# Patient Record
Sex: MALE | Race: BLACK OR AFRICAN AMERICAN | NOT HISPANIC OR LATINO | Employment: STUDENT | URBAN - METROPOLITAN AREA
[De-identification: names, ages, dates, MRNs, and addresses within clinical notes are randomized per-mention and may not be internally consistent; named-entity substitution may affect disease eponyms.]

---
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
## 2023-03-30 ENCOUNTER — HOSPITAL ENCOUNTER (EMERGENCY)
Facility: CLINIC | Age: 23
Discharge: HOME | End: 2023-03-30
Attending: EMERGENCY MEDICINE | Admitting: EMERGENCY MEDICINE
Payer: COMMERCIAL

## 2023-03-30 VITALS
TEMPERATURE: 97 F | HEART RATE: 84 BPM | DIASTOLIC BLOOD PRESSURE: 56 MMHG | WEIGHT: 179.6 LBS | OXYGEN SATURATION: 100 % | RESPIRATION RATE: 18 BRPM | SYSTOLIC BLOOD PRESSURE: 124 MMHG | HEIGHT: 69 IN | BODY MASS INDEX: 26.6 KG/M2

## 2023-03-30 DIAGNOSIS — F11.21 OPIOID DEPENDENCE IN REMISSION (H): ICD-10-CM

## 2023-03-30 DIAGNOSIS — Z76.0 ENCOUNTER FOR MEDICATION REFILL: ICD-10-CM

## 2023-03-30 PROCEDURE — 250N000013 HC RX MED GY IP 250 OP 250 PS 637: Performed by: EMERGENCY MEDICINE

## 2023-03-30 PROCEDURE — 99283 EMERGENCY DEPT VISIT LOW MDM: CPT

## 2023-03-30 PROCEDURE — 99283 EMERGENCY DEPT VISIT LOW MDM: CPT | Performed by: EMERGENCY MEDICINE

## 2023-03-30 RX ORDER — BUPRENORPHINE AND NALOXONE 2; .5 MG/1; MG/1
1 FILM, SOLUBLE BUCCAL; SUBLINGUAL DAILY
Qty: 7 FILM | Refills: 0 | Status: SHIPPED | OUTPATIENT
Start: 2023-03-30 | End: 2023-04-06

## 2023-03-30 RX ORDER — BUPRENORPHINE AND NALOXONE 2; .5 MG/1; MG/1
1 FILM, SOLUBLE BUCCAL; SUBLINGUAL ONCE
Status: COMPLETED | OUTPATIENT
Start: 2023-03-30 | End: 2023-03-30

## 2023-03-30 RX ADMIN — BUPRENORPHINE AND NALOXONE 1 FILM: 2; .5 FILM, SOLUBLE BUCCAL; SUBLINGUAL at 15:58

## 2023-03-30 ASSESSMENT — ACTIVITIES OF DAILY LIVING (ADL): ADLS_ACUITY_SCORE: 33

## 2023-03-30 NOTE — DISCHARGE INSTRUCTIONS
Suboxone prescription given for 7 day supply.     Please follow up with your suboxone provider our [facility] service for further refills.     [facility]  [address], [address]   [place], [place], [address]  Phone: [phone]  Fax: [phone]    Open Monday-Friday  Closed over lunch hour  Walk in hours: 9am-11:30am and 12:30-3pm

## 2023-03-30 NOTE — ED PROVIDER NOTES
"    [facility] EMERGENCY DEPARTMENT ([facility])    3/30/23      ED PROVIDER NOTE    History     Chief Complaint   Patient presents with     Withdrawal     Pt states he ran out of suboxone 2 days ago and is looking to get another prescription for it.     The history is provided by the patient and medical records.     Reji Goldstein is a 22 year old male with past medical history significant for opioid dependence who presents to the ED requesting Suboxone.  He says he has been out of his Suboxone for 2 days.  He says he contacted [facility] in [place] but was told he needed an appointment and could not get in today.  He says he has been on Suboxone for 1 year and is trying to get off of it.  He says he is on 2 mg once daily.  He denies recent drug use.    Past Medical History  History reviewed. No pertinent past medical history.  History reviewed. No pertinent surgical history.  buprenorphine HCl-naloxone HCl (SUBOXONE) 2-0.5 MG per film  naloxone (NARCAN) 4 MG/0.1ML nasal spray      No Known Allergies  Family History  History reviewed. No pertinent family history.  Social History   Social History     Tobacco Use     Smoking status: Some Days     Types: Vaping Device     Passive exposure: Never   Substance Use Topics     Alcohol use: Not Currently     Drug use: Not Currently      Past medical history, past surgical history, medications, allergies, family history, and social history were reviewed with the patient. No additional pertinent items.      A medically appropriate review of systems was performed with pertinent positives and negatives noted in the HPI, and all other systems negative.    Physical Exam   BP: 110/64  Pulse: 84  Temp: 97.9  F (36.6  C)  Resp: 18  Height: 175.3 cm (5' 9\")  Weight: 81.5 kg (179 lb 9.6 oz)  SpO2: 99 %  Physical Exam  Vitals and nursing note reviewed.   Constitutional:       Appearance: He is normal weight.   HENT:      Head: Normocephalic and atraumatic.      Nose: No " congestion or rhinorrhea.   Eyes:      General: No scleral icterus.     Extraocular Movements: Extraocular movements intact.   Cardiovascular:      Rate and Rhythm: Normal rate.   Pulmonary:      Effort: Pulmonary effort is normal.   Musculoskeletal:         General: No deformity or signs of injury.   Skin:     Coloration: Skin is not jaundiced or pale.   Neurological:      General: No focal deficit present.      Mental Status: He is alert and oriented to person, place, and time.   Psychiatric:         Mood and Affect: Mood normal.         Behavior: Behavior normal.         Thought Content: Thought content normal.         Judgment: Judgment normal.           ED Course, Procedures, & Data     3:44 PM  The patient was seen and examined by Faina Garcia MD in .     Procedures                     No results found for any visits on 03/30/23.  Medications   buprenorphine HCl-naloxone HCl (SUBOXONE) 2-0.5 MG per film 1 Film (1 Film Sublingual $Given 3/30/23 4845)     Labs Ordered and Resulted from Time of ED Arrival to Time of ED Departure - No data to display  No orders to display          Critical care was not performed.     Medical Decision Making  The patient's presentation was of straightforward complexity (a clearly self-limited or minor problem).    The patient's evaluation involved:  history and exam without other MDM data elements    The patient's management necessitated moderate risk (prescription drug management including medications given in the ED).      Assessment & Plan    Reji Goldstein is a 22 year old male with past medical history significant for opioid dependence who presents to the ED requesting Suboxone.   He has been without his suboxone for 2 days and does have a suboxone provider.  He was given a suboxone dose here and a refill to help bridge until he has seen his suboxone provider. He denies recent opiate use.  He was also given information to our recovery clinic.     I have reviewed the nursing  notes. I have reviewed the findings, diagnosis, plan and need for follow up with the patient.    Discharge Medication List as of 3/30/2023  4:01 PM      START taking these medications    Details   buprenorphine HCl-naloxone HCl (SUBOXONE) 2-0.5 MG per film Place 1 Film under the tongue daily for 7 days, Disp-7 Film, R-0, Local Print             Final diagnoses:   Encounter for medication refill   Opioid dependence in remission (H)     I, Sera Lyon, am serving as a trained medical scribe to document services personally performed by Faina Garcia MD based on the provider's statements to me on March 30, 2023.  This document has been checked and approved by the attending provider.    I, Faina Garcia MD, was physically present and have reviewed and verified the accuracy of this note documented by Sera Lyon, medical scribe.        Faina Garcia MD  [facility] EMERGENCY DEPARTMENT  3/30/2023     Faina Garica MD  04/01/23 3723

## 2023-05-15 ENCOUNTER — OFFICE VISIT (OUTPATIENT)
Dept: BEHAVIORAL HEALTH | Facility: CLINIC | Age: 23
End: 2023-05-15
Payer: COMMERCIAL

## 2023-05-15 VITALS — HEART RATE: 82 BPM | OXYGEN SATURATION: 100 % | SYSTOLIC BLOOD PRESSURE: 113 MMHG | DIASTOLIC BLOOD PRESSURE: 68 MMHG

## 2023-05-15 DIAGNOSIS — F11.20 OPIOID USE DISORDER, SEVERE, DEPENDENCE (H): Primary | ICD-10-CM

## 2023-05-15 DIAGNOSIS — F17.200 NICOTINE USE DISORDER: ICD-10-CM

## 2023-05-15 LAB
AMPHETAMINE QUAL URINE POCT: NEGATIVE
BARBITURATE QUAL URINE POCT: NEGATIVE
BENZODIAZEPINE QUAL URINE POCT: NEGATIVE
BUPRENORPHINE QUAL URINE POCT: ABNORMAL
COCAINE QUAL URINE POCT: NEGATIVE
CREATININE QUAL URINE POCT: ABNORMAL
FENTANYL UR QL: NORMAL
INTERNAL QC QUAL URINE POCT: ABNORMAL
MDMA QUAL URINE POCT: NEGATIVE
METHADONE QUAL URINE POCT: NEGATIVE
METHAMPHETAMINE QUAL URINE POCT: NEGATIVE
OPIATE QUAL URINE POCT: NEGATIVE
OXYCODONE QUAL URINE POCT: NEGATIVE
PH QUAL URINE POCT: ABNORMAL
PHENCYCLIDINE QUAL URINE POCT: NEGATIVE
POCT KIT EXPIRATION DATE: ABNORMAL
POCT KIT LOT NUMBER: ABNORMAL
SPECIFIC GRAVITY POCT: >=1.03
TEMPERATURE URINE POCT: ABNORMAL
THC QUAL URINE POCT: NEGATIVE

## 2023-05-15 PROCEDURE — 80305 DRUG TEST PRSMV DIR OPT OBS: CPT | Performed by: FAMILY MEDICINE

## 2023-05-15 PROCEDURE — 80307 DRUG TEST PRSMV CHEM ANLYZR: CPT | Performed by: FAMILY MEDICINE

## 2023-05-15 PROCEDURE — 99205 OFFICE O/P NEW HI 60 MIN: CPT | Performed by: FAMILY MEDICINE

## 2023-05-15 RX ORDER — BUPRENORPHINE HYDROCHLORIDE, NALOXONE HYDROCHLORIDE 2; .5 MG/1; MG/1
FILM, SOLUBLE BUCCAL; SUBLINGUAL
COMMUNITY
Start: 2023-04-24 | End: 2023-07-07

## 2023-05-15 RX ORDER — BUPRENORPHINE AND NALOXONE 2; .5 MG/1; MG/1
1 FILM, SOLUBLE BUCCAL; SUBLINGUAL DAILY
Qty: 15 FILM | Refills: 0 | Status: SHIPPED | OUTPATIENT
Start: 2023-05-15 | End: 2023-05-26

## 2023-05-15 ASSESSMENT — PATIENT HEALTH QUESTIONNAIRE - PHQ9: SUM OF ALL RESPONSES TO PHQ QUESTIONS 1-9: 2

## 2023-05-15 NOTE — PROGRESS NOTES
[facility] Initial Visit    ASSESSMENT/PLAN                                                      1. Opioid use disorder, severe, dependence (H)  22 yo male with 3-4 yr h/o inhaled fentanyl use, last reported use 12/2021.  On buprenorphine since 12/2021, has been tapering dose of buprenorphine at his request since Fall, 2022.  Has been taking 2.5mg buprenorphine/day for the past 2-3 weeks.  Previously experienced withdrawal symptoms with attempts to discontinue buprenorphine.  Wants to continue to taper buprenorphine.    Not interested in transfer to XR buprenorphine at this time.   Proceed with pt's request to decrease buprenorphine to 2mg/day.  Discussed no greater decrease in dose than 25% of TDD every 2 weeks.   F/u fentanyl testing today.   Reviewed risks of return to use with discontinuation of therapy  Pt declined ID screening today.   - Drugs of Abuse Screen Urine (POC CUPS) POCT; Standing  - Fentanyl Qualitative with Reflex to Quant Urine; Future  - naloxone (NARCAN) 4 MG/0.1ML nasal spray; Spray 1 spray (4 mg) into one nostril alternating nostrils as needed for opioid reversal every 2-3 minutes until assistance arrives  Dispense: 0.2 mL; Refill: 0  - Drugs of Abuse Screen Urine (POC CUPS) POCT  - Fentanyl Qualitative with Reflex to Quant Urine  - buprenorphine HCl-naloxone HCl (SUBOXONE) 2-0.5 MG per film; Place 1 Film under the tongue daily  Dispense: 15 Film; Refill: 0    2. Nicotine use disorder  Encouraged efforts to quit vaping  - nicotine polacrilex (NICORETTE) 4 MG gum; Place 1 each (4 mg) inside cheek every hour as needed for smoking cessation  Dispense: 240 each; Refill: 3       Return in 11 days (on 5/26/2023) for Follow up, in person.    Patient counseling completed today:  Discussed mechanism of action, potential risks/benefits/side effects of medications and other recommendations above.    Harm reduction counseling including never use alone, availability of naloxone,  avoiding combination of opioids with benzodiazepines, alcohol, or other sedatives, safer administration.      Discussed importance of avoiding isolation, building a network of supportive relationships, avoiding people/places/things associated with past use to reduce risk of relapse    SUBJECTIVE                                                      CC/HPI:  Reji Goldstein is a 23 year old male with PMH nicotine use disorder and opioid use disorder on buprenorphine taper per his request who presents to the [facility] for initial visit.      Brief History:  Reji Goldstein was first seen in Recovery Clinic on 05/15/23. They were referred by staff from [facility] ED after pt was seen there in 3/2023 requesting rx for buprenorphine.   Patient's reasons for seeking treatment on this date include wanting to continue taper off buprenorphine.    Pt started buprenorphine for treatment of OUD through [facility] in 12/2021.  Max dose 16mg/day.  He began tapering dose on his own in Fall, 2022.  He has continued to decrease dose since that time  Most recently he reports taking buprenorphine 2.5mg/day for the past 2-3 weeks.  He has experienced withdrawal symptoms in previous attempts to stop buprenorphine altogether.  He denies return to use of fentanyl since 12/2021.  He also denies cravings.      Substance Use History :  Opioids:   Age at first use: 19  Current use: substance: Perc 30's ; quantity stated never counted but 10 pills would be the max he would use daily ; route: inhalation  ; timing of last use: 12/2021;      IV drug use: No   History of overdose: Yes: one episode in a vehicle with friends 7/2020 x2  Previous residential or outpatient treatments for addiction : Yes: kerrie in 2021 in patient   Previous medication treatments for addiction: Yes: Currently is taking Suboxone   Longest period of sobriety: currently around 1.5 years   Medical complications related to substance use: denies   Hepatitis C: no record of  testing  HIV: 8/14/2019 HIV ag/ab nonreactive    Other Addiction History:  Stimulants   Denies   Sedatives/hypnotics/anxiolytics:   Denies   Alcohol:   Denies   Nicotine:   currently is vaping   Cannabis:   Denies   Hallucinogens/Dissociatives:   Denies   Eating disorder:  Denies   Gambling:   Denies         [place] Prescription Drug Monitoring Program Reviewed:  Yes;   04/24/2023  2   04/19/2023  Suboxone 2 Mg-0.5 MG SL Film  9.00  21 Me Bau   [id number]   [facility] ([id number]   0/[place]  0.86 mg  [place]icaid   [place]   04/19/2023  2   04/19/2023  Suboxone 2 Mg-0.5 MG SL Film  3.00  5 Me Bau   [id number]   [facility] ([id number])   0/[place]  1.20 mg  [place]   04/10/2023  2   04/10/2023  Suboxone 2 Mg-0.5 MG SL Film  4.00  8 De Ebe   [id number]   [facility] ([id number])   0/[place]  1.00 mg  [place]   03/30/2023  2   03/30/2023  Suboxone 2 Mg-0.5 MG SL Film  7.00  7 Ca Jean-Paul   [id number]   [facility] ([id number])   0/[place]  2.00 mg  [place]   03/23/2023  2   02/28/2023  Suboxone 2 Mg-0.5 MG SL Film  2.00  8 Le Sim   [id number]   [facility] ([id number][place]   0/[place]  0.50 mg  [place]   03/22/2023  2   02/28/2023  Suboxone 2 Mg-0.5 MG SL Film  2.00  4 Le Sim   [id number]   [facility] ([id number])   0/[place]  1.00 mg  [place]   02/22/2023  2   02/21/2023  Suboxone 2 Mg-0.5 MG SL Film  7.00  7 Le Sim   [id number]   [facility] ([id number])   0/[place]  2.00 mg  [place]   02/08/2023  2   02/08/2023  Suboxone 2 Mg-0.5 MG SL Film  11.00  11 De Ebe   [id number]   [facility] ([id number][place]   0/[place]  2.00 mg  [place]   10/18/2022  2   10/17/2022  Suboxone 8 Mg-2 MG SL Film  60.00  30 Ph Gyu   [id number]   [facility] ([id number])   0/[place]  16.00 mg  [place]   09/01/2022  2   08/30/2022  Suboxone 8 Mg-2 MG SL Film  60.00  30 Ph Gyu   [id number]   [facility] ([id number])   0/[place]  16.00 mg  [place]   08/29/2022  2   08/29/2022  Suboxone 8 Mg-2 MG SL Film  2.00  1 Ph Gyu   [id number]   [facility] ([id number])   0/[place]  16.00 mg  [place]   08/01/2022  2   08/01/2022  Suboxone 12 Mg-3 MG SL Film  30.00  30 Ph Gyu   [id number]   [facility] ([id number])   0/[place]  12.00 mg  [place]   07/18/2022  2   07/15/2022   Suboxone 12 Mg-3 MG SL Film  15.00  15  Gyu   [id number]   [facility] ([id number])   0/0  12.00 mg  [place]icaid   [place]         No past medical history on file.      PAST PSYCHIATRIC HISTORY:  Diagnoses- denies   Suicide Attempts: No   Hospitalizations: No         5/15/2023    12:49 PM   PHQ   PHQ-9 Total Score 2   Q9: Thoughts of better off dead/self-harm past 2 weeks Not at all         If PHQ-9 score of 15 or higher, has [facility] therapist or provider been notified? N/A    Any current suicidal ideation? No  If yes, has [facility] therapist or provider been notified? N/A    Mental health provider: denies  (follow up on MH referral if needed)    No past surgical history on file.    Medications:  naloxone (NARCAN) 4 MG/0.1ML nasal spray, Spray 1 spray (4 mg) into one nostril alternating nostrils as needed for opioid reversal every 2-3 minutes until assistance arrives    No current facility-administered medications on file prior to visit.      No Known Allergies    No family history on file.      Social History  Housing status: with family father and 8 siblings  Employment status: Unemployed, seeking work; in college studying nursing  Relationship status: Single  Children: no children  Legal: denies   Insurance needs: active   Contact information up to date? yes    3rd Party Involvement not at this time      REVIEW OF SYSTEMS:  Denies withdrawal symptoms when taking buprenorphine 2.5mg/day.     OBJECTIVE                                                      /68   Pulse 82   SpO2 100%     Physical Exam  Constitutional:       Appearance: Normal appearance.   HENT:      Head: Normocephalic and atraumatic.      Nose: No rhinorrhea.   Eyes:      General: No scleral icterus.     Extraocular Movements: Extraocular movements intact.      Conjunctiva/sclera: Conjunctivae normal.   Pulmonary:      Effort: Pulmonary effort is normal.   Neurological:      Mental Status: He is alert and oriented to person, place, and time.       Coordination: Coordination is intact.      Gait: Gait is intact.   Psychiatric:         Attention and Perception: Attention and perception normal.         Mood and Affect: Mood and affect normal.         Speech: Speech normal.         Behavior: Behavior is cooperative.         Thought Content: Thought content normal.      Comments: Insight and judgement are fair to good         Labs:    UDS:   No results found for: BUP, BZO, BAR, RAMON, MAMP, AMP, MDMA, MTD, PMC739, OXY, PCP, THC, TEMP, SGPOCT    *POC urine drug screen does not screen for Fentanyl    Recent Results (from the past 720 hour(s))   Drugs of Abuse Screen Urine (POC CUPS) POCT    Collection Time: 05/15/23  1:13 PM   Result Value Ref Range    POCT Kit Lot Number [id number]     POCT Kit Expiration Date 8171273     Temperature Urine POCT 90 F 90 F, 92 F, 94 F, 96 F, 98 F, 100 F    Specific Gravity POCT >=1.030 (A) 1.005, 1.015, 1.025    pH Qual Urine POCT 7 pH 4 pH, 5 pH, 7 pH, 9 pH    Creatinine Qual Urine POCT 100 mg/dL 20 mg/dL, 50 mg/dL, 100 mg/dL, 200 mg/dL    Internal QC Qual Urine POCT Valid Valid    Amphetamine Qual Urine POCT Negative Negative    Barbiturate Qual Urine POCT Negative Negative    Buprenorphine Qual Urine POCT Screen Positive (A) Negative    Benzodiazepine Qual Urine POCT Negative Negative    Cocaine Qual Urine POCT Negative Negative    Methamphetamine Qual Urine POCT Negative Negative    MDMA Qual Urine POCT Negative Negative    Methadone Qual Urine POCT Negative Negative    Opiate Qual Urine POCT Negative Negative    Oxycodone Qual Urine POCT Negative Negative    Phencyclidine Qual Urine POCT Negative Negative    THC Qual Urine POCT Negative Negative               At least 60 min spent in review of medical record,  review, obtaining histories, discussing recommendations, counseling, providing support.      Erica Padron MD  Addiction Medicine  [facility] [place] [facility]  [address] [place] [address]  [id number]  [phone]

## 2023-05-26 ENCOUNTER — OFFICE VISIT (OUTPATIENT)
Dept: BEHAVIORAL HEALTH | Facility: CLINIC | Age: 23
End: 2023-05-26
Payer: COMMERCIAL

## 2023-05-26 VITALS
SYSTOLIC BLOOD PRESSURE: 109 MMHG | BODY MASS INDEX: 25.99 KG/M2 | WEIGHT: 176 LBS | DIASTOLIC BLOOD PRESSURE: 81 MMHG | HEART RATE: 80 BPM

## 2023-05-26 DIAGNOSIS — F11.20 OPIOID USE DISORDER, SEVERE, DEPENDENCE (H): Primary | ICD-10-CM

## 2023-05-26 DIAGNOSIS — F17.200 NICOTINE USE DISORDER: ICD-10-CM

## 2023-05-26 LAB
AMPHETAMINE QUAL URINE POCT: NEGATIVE
BARBITURATE QUAL URINE POCT: NEGATIVE
BENZODIAZEPINE QUAL URINE POCT: NEGATIVE
BUPRENORPHINE QUAL URINE POCT: ABNORMAL
COCAINE QUAL URINE POCT: NEGATIVE
CREATININE QUAL URINE POCT: ABNORMAL
FENTANYL UR QL: NORMAL
INTERNAL QC QUAL URINE POCT: ABNORMAL
MDMA QUAL URINE POCT: NEGATIVE
METHADONE QUAL URINE POCT: NEGATIVE
METHAMPHETAMINE QUAL URINE POCT: NEGATIVE
OPIATE QUAL URINE POCT: NEGATIVE
OXYCODONE QUAL URINE POCT: NEGATIVE
PH QUAL URINE POCT: ABNORMAL
PHENCYCLIDINE QUAL URINE POCT: NEGATIVE
POCT KIT EXPIRATION DATE: ABNORMAL
POCT KIT LOT NUMBER: ABNORMAL
SPECIFIC GRAVITY POCT: 1.02
TEMPERATURE URINE POCT: ABNORMAL
THC QUAL URINE POCT: NEGATIVE

## 2023-05-26 PROCEDURE — 80307 DRUG TEST PRSMV CHEM ANLYZR: CPT | Performed by: FAMILY MEDICINE

## 2023-05-26 PROCEDURE — 80299 QUANTITATIVE ASSAY DRUG: CPT | Mod: 90 | Performed by: FAMILY MEDICINE

## 2023-05-26 PROCEDURE — 99214 OFFICE O/P EST MOD 30 MIN: CPT | Performed by: FAMILY MEDICINE

## 2023-05-26 PROCEDURE — H0038 SELF-HELP/PEER SVC PER 15MIN: HCPCS | Mod: U5

## 2023-05-26 PROCEDURE — 99000 SPECIMEN HANDLING OFFICE-LAB: CPT | Performed by: FAMILY MEDICINE

## 2023-05-26 RX ORDER — BUPRENORPHINE AND NALOXONE 2; .5 MG/1; MG/1
1 FILM, SOLUBLE BUCCAL; SUBLINGUAL DAILY
Qty: 15 FILM | Refills: 0 | Status: SHIPPED | OUTPATIENT
Start: 2023-05-26 | End: 2023-06-14

## 2023-05-26 ASSESSMENT — PAIN SCALES - GENERAL: PAINLEVEL: NO PAIN (0)

## 2023-05-26 ASSESSMENT — PATIENT HEALTH QUESTIONNAIRE - PHQ9: SUM OF ALL RESPONSES TO PHQ QUESTIONS 1-9: 1

## 2023-05-26 NOTE — PROGRESS NOTES
[facility]      Rooming information:  Approximate last use of full opioid agonist: End of 2021  Taking buprenorphine? Yes:  As prescribed? No. Reports using less, 1/2 film a day. Pt says it hold him well  Number of buprenorphine films/tablets remaining currently: maybe 5 or 6  Side effects related to buprenorphine (constipation, dry mouth, sedation?) No   Narcan currently available: Yes  Other recent substance use:    Denies  NICOTINE-Yes: vape  If using nicotine, ready to quit? Yes: trying to quit    Point of care urine drug screen positive for: Unable to urinate, drinking coffee  Lab Results   Component Value Date    BUP Screen Positive (A) 05/26/2023    BZO Negative 05/26/2023    BAR Negative 05/26/2023    RAMON Negative 05/26/2023    MAMP Negative 05/26/2023    AMP Negative 05/26/2023    MDMA Negative 05/26/2023    MTD Negative 05/26/2023    [id number] Negative 05/26/2023    OXY Negative 05/26/2023    PCP Negative 05/26/2023    THC Negative 05/26/2023    TEMP 94 F 05/26/2023    SGPOCT 1.025 05/26/2023       *POC urine drug screen does not screen for Fentanyl            5/26/2023    11:00 AM   PHQ Assesment Total Score(s)   PHQ-9 Score 1       If PHQ-9 score of 15 or higher, has [facility] therapist or provider been notified? No    Any current suicidal ideation? N/A  If yes, has [facility] therapist or provider been notified? No and N/A    Primary care provider: Physician No Ref-Primary     Mental health provider: no (follow up on MH referral if needed)    Insurance needs: active    Housing needs: lives with his family    Contact information up to date? yes    3rd Party Involvement NA (please obtain ELBA if pt would like to include)    Sravanthi Wagner LPN  May 26, 2023  11:01 AM

## 2023-05-26 NOTE — PROGRESS NOTES
[facility]    Peer  met with Reji Goldstein in the Recovery Clinic to introduce himself, detail services provided and discuss current status of recovery. Pt appeared alert, oriented and open to feedback during our discussion.     Pt arrives for visit with provider for suboxone refill.    Reji  reports  recovery going well. He has an up coming appointment with Rickey BERKOWITZ at [facility]. Reji  shared his Doc  was Percocet.  Writer commend him  for avoiding old routines and habits          PRC provided business card to pt welcoming contact for recovery based support and resources. PRC and pt agree to speak again during an upcoming  visit.           Service Type:     Individual     Session Start Time:  11:15 am                     Session End Time:    11:30 am    Session Length:   15 mins    Patient Goal:   To utilize suboxone assisted treatment for sobriety and long term recovery.     Goal Progress:   Ongoing.    Key Risk Factors to Recovery:   PRC encourages being aware of risk factors that can lead to re-use which include avoiding isolation, avoiding triggers and managing cravings in a healthy manner. being open and willing to acceptance and change on a daily basis.  PRC encourages pt to establish a sober network calling tree to reach out to when needed.  Continue to practice honesty with ourselves and trusted support person(s).   PRC encourages regular attendance at recovery based meetings as well as finding a sponsor for mentoring and accountability.   PRC encourages consideration of other services such as counseling for mental health issues which can correlate with our substance use.      Support Needs:   Ongoing care, support and resources for opioid substance use disorder.     Follow up:   PRC has provided pt with his contact information for support and resource needs.    PRC and pt agree to meet during an upcoming  visit.       [facility]  Clinic  [facility] [address], [address]   [place], [place], [address]  Clinic Phone: [phone]  Clinic Fax: [phone]  Peer  phone: [phone]    Open Monday - Friday  9:00am-4:00pm  Walk in hours: 9am-3pm      Iona Olivares  May 26, 2023  1:43 PM    Derrek YIN provides clinical oversite and supervision of care.

## 2023-05-26 NOTE — PROGRESS NOTES
[facility] Return Visit    ASSESSMENT/PLAN                                                    1. Opioid use disorder, severe, dependence (H)  Pt reduced buprenorphine from 2mg/day to 1mg/day 5/15/23.  Denies withdrawal symptoms or cravings.   Would like to continue taper off buprenorphine.   Reviewed risk of return to use with discontinuation of treatment, discussed tapering process is voluntary and can be reversed at any time.   Recommend he continue buprenorphine 1mg/day at this time before proceeding with further reduction.  Pt in agreement with this.   Discussed he can return to 2mg/day if needed.   Pt met with  therapist today to discuss his questions about finding employment and scheduled a visit.      - Buprenorphine & Metabolite Screen; Future  - Fentanyl Qualitative with Reflex to Quant Urine; Future  - Fentanyl Qualitative with Reflex to Quant Urine  - Buprenorphine & Metabolite Screen  - Drugs of Abuse Screen Urine (POC CUPS) POCT  - buprenorphine HCl-naloxone HCl (SUBOXONE) 2-0.5 MG per film; Place 1 Film under the tongue daily  Dispense: 15 Film; Refill: 0    2. Nicotine use disorder  Encouraged his efforts to quit vaping, proceed with plans to  rx for nicotine gum to assist.      Return in about 2 weeks (around 6/9/2023) for Follow up, in person.    Patient counseling completed today:  Discussed mechanism of action, potential risks/benefits/side effects of medications and other recommendations above.    Harm reduction counseling including never use alone, availability of naloxone, avoiding combination of opioids with benzodiazepines, alcohol, or other sedatives, safer administration.      Discussed importance of avoiding isolation, building a network of supportive relationships, avoiding people/places/things associated with past use to reduce risk of relapse    SUBJECTIVE                                                      CC/HPI:  Rejiryan Goldstein is a 23 year old male  with [facility] nicotine use disorder and opioid use disorder on buprenorphine taper per his request who presents to the Recovery Clinic for return visit.      Brief History:  Reji Goldstein was first seen in Recovery Clinic on 05/15/23. They were referred by staff from [facility] ED after pt was seen there in 3/2023 requesting rx for buprenorphine.   Patient's reasons for seeking treatment on this date include wanting to continue taper off buprenorphine.    Pt started buprenorphine for treatment of OUD through [facility] in 12/2021.  Max dose 16mg/day.  He began tapering dose on his own in Fall, 2022.  He has continued to decrease dose since that time  Most recently he reports taking buprenorphine 2.5mg/day for the past 2-3 weeks.  He has experienced withdrawal symptoms in previous attempts to stop buprenorphine altogether.  He denies return to use of fentanyl since 12/2021.  He also denies cravings.      Substance Use History :  Opioids:   Age at first use: 19  Current use: substance: Perc 30's ; quantity stated never counted but 10 pills would be the max he would use daily ; route: inhalation  ; timing of last use: 12/2021;      IV drug use: No   History of overdose: Yes: one episode in a vehicle with friends 7/2020 x2  Previous residential or outpatient treatments for addiction : Yes: [facility] in 2021 in patient   Previous medication treatments for addiction: Yes: Currently is taking Suboxone   Longest period of sobriety: currently around 1.5 years   Medical complications related to substance use: denies   Hepatitis C: no record of testing  HIV: 8/14/2019 HIV ag/ab nonreactive    Other Addiction History:  Stimulants   Denies   Sedatives/hypnotics/anxiolytics:   Denies   Alcohol:   Denies   Nicotine:   currently is vaping   Cannabis:   Denies   Hallucinogens/Dissociatives:   Denies   Eating disorder:  Denies   Gambling:   Denies       A/P from most recent  visit 5/15/23:  1. Opioid use disorder, severe, dependence (H)  24 yo male  "with 3-4 yr h/o inhaled fentanyl use, last reported use 12/2021.  On buprenorphine since 12/2021, has been tapering dose of buprenorphine at his request since Fall, 2022.  Has been taking 2.5mg buprenorphine/day for the past 2-3 weeks.  Previously experienced withdrawal symptoms with attempts to discontinue buprenorphine.  Wants to continue to taper buprenorphine.    Not interested in transfer to XR buprenorphine at this time.   Proceed with pt's request to decrease buprenorphine to 2mg/day.  Discussed no greater decrease in dose than 25% of TDD every 2 weeks.   F/u fentanyl testing today.   Reviewed risks of return to use with discontinuation of therapy  Pt declined ID screening today.   - Drugs of Abuse Screen Urine (POC CUPS) POCT; Standing  - Fentanyl Qualitative with Reflex to Quant Urine; Future  - naloxone (NARCAN) 4 MG/0.1ML nasal spray; Spray 1 spray (4 mg) into one nostril alternating nostrils as needed for opioid reversal every 2-3 minutes until assistance arrives  Dispense: 0.2 mL; Refill: 0  - Drugs of Abuse Screen Urine (POC CUPS) POCT  - Fentanyl Qualitative with Reflex to Quant Urine  - buprenorphine HCl-naloxone HCl (SUBOXONE) 2-0.5 MG per film; Place 1 Film under the tongue daily  Dispense: 15 Film; Refill: 0     2. Nicotine use disorder  Encouraged efforts to quit vaping  - nicotine polacrilex (NICORETTE) 4 MG gum; Place 1 each (4 mg) inside cheek every hour as needed for smoking cessation  Dispense: 240 each; Refill: 3                Return in 11 days (on 5/26/2023) for Follow up, in person.      5/26/23 visit:  Pt states he reduced buprenorphine to 1mg/day after his 5/15 visit.  He denies withdrawal symptoms or cravings for opioids.  Denies any return to use.  Only other substance he reports using is nicotine.  Has been trying to vape less often, using distraction.  Has not picked up rx for nicotine gum yet but plans to.    States his moods have been good, that he has \"a happy family, there's " "nothing to feel bad about.\"  Describes participating in therapy in the past, but does not think he needs that type of support right now.   Taking a break from school over the summer, would like to find a job.     [place] Prescription Drug Monitoring Program Reviewed:  Yes;   05/15/2023  1   05/15/2023  Suboxone 2 Mg-0.5 MG SL Film  15.00  15 Li Vol   [id number]   Wal ([id number])   0/0  2.00 mg  [place]   04/24/2023  1   04/19/2023  Suboxone 2 Mg-0.5 MG SL Film  9.00  21 Me Albert   [id number]   Wal ([id number])   0/0  0.86 mg  [place]           No past medical history on file.      PAST PSYCHIATRIC HISTORY:  Diagnoses- denies   Suicide Attempts: No   Hospitalizations: No         5/15/2023    12:49 PM 5/26/2023    11:00 AM   PHQ   PHQ-9 Total Score 2 1   Q9: Thoughts of better off dead/self-harm past 2 weeks Not at all Not at all       Mental health provider: denies  (follow up on MH referral if needed)    No past surgical history on file.    Medications:  buprenorphine HCl-naloxone HCl (SUBOXONE) 2-0.5 MG per film, Place 1 Film under the tongue daily  naloxone (NARCAN) 4 MG/0.1ML nasal spray, Spray 1 spray (4 mg) into one nostril alternating nostrils as needed for opioid reversal every 2-3 minutes until assistance arrives  nicotine polacrilex (NICORETTE) 4 MG gum, Place 1 each (4 mg) inside cheek every hour as needed for smoking cessation (Patient not taking: Reported on 5/26/2023)  SUBOXONE 2-0.5 MG per film, Take one film daily    No current facility-administered medications on file prior to visit.      No Known Allergies    No family history on file.      Social History  Housing status: with family father and 10 siblings  Employment status: Unemployed, seeking work; in college studying nursing  Relationship status: Single  Children: no children  Legal: denies   Insurance needs: active   Contact information up to date? yes    3rd Party Involvement not at this time      REVIEW OF SYSTEMS:  No other concerns    OBJECTIVE  "                                                     /81 (BP Location: Left arm, Patient Position: Sitting, Cuff Size: Adult Regular)   Pulse 80   Wt 79.8 kg (176 lb)   BMI 25.99 kg/m      Physical Exam  Constitutional:       Appearance: Normal appearance.   HENT:      Head: Normocephalic and atraumatic.      Nose: No rhinorrhea.   Eyes:      General: No scleral icterus.     Extraocular Movements: Extraocular movements intact.      Conjunctiva/sclera: Conjunctivae normal.   Pulmonary:      Effort: Pulmonary effort is normal.   Neurological:      Mental Status: He is alert and oriented to person, place, and time.      Coordination: Coordination is intact.      Gait: Gait is intact.   Psychiatric:         Attention and Perception: Attention and perception normal.         Mood and Affect: Mood and affect normal.         Speech: Speech normal.         Behavior: Behavior is cooperative.         Thought Content: Thought content normal.      Comments: Insight and judgement are fair to good         Labs:    UDS:   Lab Results   Component Value Date    BUP Screen Positive (A) 05/15/2023    BZO Negative 05/15/2023    BAR Negative 05/15/2023    RAMON Negative 05/15/2023    MAMP Negative 05/15/2023    AMP Negative 05/15/2023    MDMA Negative 05/15/2023    MTD Negative 05/15/2023    [id number] Negative 05/15/2023    OXY Negative 05/15/2023    PCP Negative 05/15/2023    THC Negative 05/15/2023    TEMP 90 F 05/15/2023    SGPOCT >=1.030 (A) 05/15/2023       *POC urine drug screen does not screen for Fentanyl    Recent Results (from the past 720 hour(s))   Fentanyl Qualitative with Reflex to Quant Urine    Collection Time: 05/15/23  1:12 PM   Result Value Ref Range    Fentanyl Qual Urine Screen Negative Screen Negative   Drugs of Abuse Screen Urine (POC CUPS) POCT    Collection Time: 05/15/23  1:13 PM   Result Value Ref Range    POCT Kit Lot Number [id number]     POCT Kit Expiration Date 1939048     Temperature Urine POCT 90 F  90 F, 92 F, 94 F, 96 F, 98 F, 100 F    Specific Gravity POCT >=1.030 (A) 1.005, 1.015, 1.025    pH Qual Urine POCT 7 pH 4 pH, 5 pH, 7 pH, 9 pH    Creatinine Qual Urine POCT 100 mg/dL 20 mg/dL, 50 mg/dL, 100 mg/dL, 200 mg/dL    Internal QC Qual Urine POCT Valid Valid    Amphetamine Qual Urine POCT Negative Negative    Barbiturate Qual Urine POCT Negative Negative    Buprenorphine Qual Urine POCT Screen Positive (A) Negative    Benzodiazepine Qual Urine POCT Negative Negative    Cocaine Qual Urine POCT Negative Negative    Methamphetamine Qual Urine POCT Negative Negative    MDMA Qual Urine POCT Negative Negative    Methadone Qual Urine POCT Negative Negative    Opiate Qual Urine POCT Negative Negative    Oxycodone Qual Urine POCT Negative Negative    Phencyclidine Qual Urine POCT Negative Negative    THC Qual Urine POCT Negative Negative   Drugs of Abuse Screen Urine (POC CUPS) POCT    Collection Time: 05/26/23 11:34 AM   Result Value Ref Range    POCT Kit Lot Number [id number]     POCT Kit Expiration Date 37333403     Temperature Urine POCT 94 F 90 F, 92 F, 94 F, 96 F, 98 F, 100 F    Specific [place] POCT 1.025 1.005, 1.015, 1.025    pH Qual Urine POCT 5 pH 4 pH, 5 pH, 7 pH, 9 pH    Creatinine Qual Urine POCT 50 mg/dL 20 mg/dL, 50 mg/dL, 100 mg/dL, 200 mg/dL    Internal QC Qual Urine POCT Valid Valid    Amphetamine Qual Urine POCT Negative Negative    Barbiturate Qual Urine POCT Negative Negative    Buprenorphine Qual Urine POCT Screen Positive (A) Negative    Benzodiazepine Qual Urine POCT Negative Negative    Cocaine Qual Urine POCT Negative Negative    Methamphetamine Qual Urine POCT Negative Negative    MDMA Qual Urine POCT Negative Negative    Methadone Qual Urine POCT Negative Negative    Opiate Qual Urine POCT Negative Negative    Oxycodone Qual Urine POCT Negative Negative    Phencyclidine Qual Urine POCT Negative Negative    THC Qual Urine POCT Negative Negative               At least 30 min spent in review  of medical record,  review, obtaining histories, discussing recommendations, counseling, providing support.      Erica Padron MD  Addiction Medicine  [facility]  [address] [place] [address] [id number] [phone]

## 2023-05-29 LAB
BUPRENORPHINE UR-MCNC: 22 NG/ML
BUPRENORPHINE UR-MCNC: <2 NG/ML
NALOXONE UR CFM-MCNC: <100 NG/ML
NORBUPRENORPHINE UR CFM-MCNC: 56 NG/ML
NORBUPRENORPHINE UR-MCNC: 8 NG/ML

## 2023-06-05 ENCOUNTER — TELEPHONE (OUTPATIENT)
Dept: BEHAVIORAL HEALTH | Facility: CLINIC | Age: 23
End: 2023-06-05
Payer: COMMERCIAL

## 2023-06-09 ENCOUNTER — OFFICE VISIT (OUTPATIENT)
Dept: BEHAVIORAL HEALTH | Facility: CLINIC | Age: 23
End: 2023-06-09
Payer: COMMERCIAL

## 2023-06-09 DIAGNOSIS — F11.20 OPIOID USE DISORDER, SEVERE, DEPENDENCE (H): ICD-10-CM

## 2023-06-09 DIAGNOSIS — F43.23 ADJUSTMENT DISORDER WITH MIXED ANXIETY AND DEPRESSED MOOD: Primary | ICD-10-CM

## 2023-06-12 ENCOUNTER — TELEPHONE (OUTPATIENT)
Dept: BEHAVIORAL HEALTH | Facility: CLINIC | Age: 23
End: 2023-06-12

## 2023-06-12 ENCOUNTER — TELEPHONE (OUTPATIENT)
Dept: BEHAVIORAL HEALTH | Facility: CLINIC | Age: 23
End: 2023-06-12
Payer: COMMERCIAL

## 2023-06-12 DIAGNOSIS — F11.90 OPIOID USE DISORDER: Primary | ICD-10-CM

## 2023-06-12 DIAGNOSIS — F11.20 OPIOID USE DISORDER, SEVERE, DEPENDENCE (H): ICD-10-CM

## 2023-06-12 PROCEDURE — 99207 PR SELF-HELP/PEER SVC PER 15 MIN: CPT

## 2023-06-12 NOTE — TELEPHONE ENCOUNTER
AMBER placed a telephone recovery support call to discuss leaving prior to seeing provider at recent scheduled appointment with in Recovery Clinic.    [facility] spoke with pt who advised he arrived for scheduled appointemnt but had to leave after wafting approximately 90 minutes to attend to other personal business matters.    Pt plans to arrive as a walk-in to  on 06/13/23. [facility] provided pt with Recovery Clinic Walk-In hours: Monday - Friday from 9 am to 3 pm.     [place] [place]  [facility]   Rec[facility]   Direct Dial: [phone]    2:35 pm

## 2023-06-14 ENCOUNTER — OFFICE VISIT (OUTPATIENT)
Dept: BEHAVIORAL HEALTH | Facility: CLINIC | Age: 23
End: 2023-06-14
Payer: COMMERCIAL

## 2023-06-14 VITALS — DIASTOLIC BLOOD PRESSURE: 70 MMHG | SYSTOLIC BLOOD PRESSURE: 110 MMHG | HEART RATE: 81 BPM

## 2023-06-14 DIAGNOSIS — F11.20 OPIOID USE DISORDER, SEVERE, DEPENDENCE (H): Primary | ICD-10-CM

## 2023-06-14 DIAGNOSIS — T40.2X5A THERAPEUTIC OPIOID-INDUCED CONSTIPATION (OIC): ICD-10-CM

## 2023-06-14 DIAGNOSIS — K59.03 THERAPEUTIC OPIOID-INDUCED CONSTIPATION (OIC): ICD-10-CM

## 2023-06-14 LAB
AMPHETAMINE QUAL URINE POCT: NEGATIVE
BARBITURATE QUAL URINE POCT: NEGATIVE
BENZODIAZEPINE QUAL URINE POCT: NEGATIVE
BUPRENORPHINE QUAL URINE POCT: ABNORMAL
COCAINE QUAL URINE POCT: NEGATIVE
CREATININE QUAL URINE POCT: ABNORMAL
INTERNAL QC QUAL URINE POCT: ABNORMAL
MDMA QUAL URINE POCT: NEGATIVE
METHADONE QUAL URINE POCT: NEGATIVE
METHAMPHETAMINE QUAL URINE POCT: NEGATIVE
OPIATE QUAL URINE POCT: NEGATIVE
OXYCODONE QUAL URINE POCT: NEGATIVE
PH QUAL URINE POCT: ABNORMAL
PHENCYCLIDINE QUAL URINE POCT: NEGATIVE
POCT KIT EXPIRATION DATE: ABNORMAL
POCT KIT LOT NUMBER: ABNORMAL
SPECIFIC GRAVITY POCT: 1.02
TEMPERATURE URINE POCT: ABNORMAL
THC QUAL URINE POCT: NEGATIVE

## 2023-06-14 PROCEDURE — 99214 OFFICE O/P EST MOD 30 MIN: CPT | Performed by: NURSE PRACTITIONER

## 2023-06-14 PROCEDURE — 80305 DRUG TEST PRSMV DIR OPT OBS: CPT | Performed by: NURSE PRACTITIONER

## 2023-06-14 RX ORDER — SENNA AND DOCUSATE SODIUM 50; 8.6 MG/1; MG/1
1 TABLET, FILM COATED ORAL DAILY PRN
Qty: 30 TABLET | Refills: 1 | Status: SHIPPED | OUTPATIENT
Start: 2023-06-14 | End: 2023-08-04

## 2023-06-14 RX ORDER — BUPRENORPHINE AND NALOXONE 2; .5 MG/1; MG/1
.5-1 FILM, SOLUBLE BUCCAL; SUBLINGUAL DAILY
Qty: 15 FILM | Refills: 0 | Status: SHIPPED | OUTPATIENT
Start: 2023-06-14 | End: 2023-06-21

## 2023-06-14 ASSESSMENT — PATIENT HEALTH QUESTIONNAIRE - PHQ9: SUM OF ALL RESPONSES TO PHQ QUESTIONS 1-9: 0

## 2023-06-14 NOTE — PROGRESS NOTES
[facility] Follow Up    ASSESSMENT/PLAN                                                      There are no diagnoses linked to this encounter.    1. Opioid use disorder, severe, dependence (H)  Continues to take suboxone 1 mg daily w/o side effects or cravings.   -Plans to continue suboxone 1 mg daily for now, and he may reduce his dose to 0.5 mg daily.   -Strongly recommend that he return to clinic before stopping suboxone completely.  -Discussed that increase life stressors can trigger more a return of using thoughts/cravings and encouraged him to resume suboxone if he begins to experience increase thoughts/cravings.   - Drugs of Abuse Screen Urine (POC CUPS) POCT  - buprenorphine HCl-naloxone HCl (SUBOXONE) 2-0.5 MG per film; Place 0.5-1 Film under the tongue daily  Dispense: 15 Film; Refill: 0    2. Therapeutic opioid-induced constipation (OIC)  - SENNA-docusate sodium (SENNA S) 8.6-50 MG tablet; Take 1 tablet by mouth daily as needed (constipation)  Dispense: 30 tablet; Refill: 1       Return in about 2 weeks (around 6/28/2023) for Follow up, in person.  Patient counseling completed today:  Discussed mechanism of action, potential risks/benefits/side effects of medications and other recommendations above.    Harm reduction counseling including never use alone, availability of naloxone, risks associated with concurrent use of opioids and benzodiazepines, alcohol, or other sedatives, safer administration as applicable.       Discussed importance of avoiding isolation, building a network of supportive relationships, avoiding people/places/things associated with past use to reduce risk of relapse; including motivational interviewing regarding psychosocial treatment for addiction.   SUBJECTIVE                                                        CC/HPI:  Reji Goldstein is a 23 year old male with PMH nicotine use disorder and opioid use disorder on buprenorphine taper per his request who  presents to the Recovery Clinic for return visit.       Brief History:  Reji Goldstein was first seen in Recovery Clinic on 05/15/23. They were referred by staff from [facility] ED after pt was seen there in 3/2023 requesting rx for buprenorphine.   Patient's reasons for seeking treatment on this date include wanting to continue taper off buprenorphine.     Pt started buprenorphine for treatment of OUD through [facility] in 12/2021.  Max dose 16mg/day.  He began tapering dose on his own in Fall, 2022.  He has continued to decrease dose since that time  Most recently he reports taking buprenorphine 2.5mg/day for the past 2-3 weeks.  He has experienced withdrawal symptoms in previous attempts to stop buprenorphine altogether.  He denies return to use of fentanyl since 12/2021.  He also denies cravings.       Substance Use History :  Opioids:   Age at first use: 19  Current use: substance: Perc 30's ; quantity stated never counted but 10 pills would be the max he would use daily ; route: inhalation  ; timing of last use: 12/2021;                 IV drug use: No   History of overdose: Yes: one episode in a vehicle with friends 7/2020 x2  Previous residential or outpatient treatments for addiction : Yes: [facility] in 2021 in patient   Previous medication treatments for addiction: Yes: Currently is taking Suboxone   Longest period of sobriety: currently around 1.5 years   Medical complications related to substance use: denies   Hepatitis C: no record of testing  HIV: 8/14/2019 HIV ag/ab nonreactive     Other Addiction History:  Stimulants   Denies   Sedatives/hypnotics/anxiolytics:   Denies   Alcohol:   Denies   Nicotine:   currently is vaping   Cannabis:   Denies   Hallucinogens/Dissociatives:   Denies   Eating disorder:  Denies   Gambling:              Denies        PAST PSYCHIATRIC HISTORY:  Diagnoses- denies   Suicide Attempts: No   Hospitalizations: No          5/15/2023    12:49 PM 5/26/2023    11:00 AM 6/14/2023    12:00 PM  "  PHQ   PHQ-9 Total Score 2 1 0   Q9: Thoughts of better off dead/self-harm past 2 weeks Not at all Not at all Not at all     Social History  Housing status: with family father and 8 siblings  Employment status: Unemployed, seeking work; in college studying nursing  Relationship status: Single  Children: no children  Legal: denies     Most recent Recovery Clinic visit 5/26/2023    Pt states he reduced buprenorphine to 1mg/day after his 5/15 visit.  He denies withdrawal symptoms or cravings for opioids.  Denies any return to use.  Only other substance he reports using is nicotine.  Has been trying to vape less often, using distraction.  Has not picked up rx for nicotine gum yet but plans to.    States his moods have been good, that he has \"a happy family, there's nothing to feel bad about.\"  Describes participating in therapy in the past, but does not think he needs that type of support right now.   Taking a break from school over the summer, would like to find a job.  A/P last visit:  1. Opioid use disorder, severe, dependence (H)  Pt reduced buprenorphine from 2mg/day to 1mg/day 5/15/23.  Denies withdrawal symptoms or cravings.   Would like to continue taper off buprenorphine.   Reviewed risk of return to use with discontinuation of treatment, discussed tapering process is voluntary and can be reversed at any time.   Recommend he continue buprenorphine 1mg/day at this time before proceeding with further reduction.  Pt in agreement with this.   Discussed he can return to 2mg/day if needed.   Pt met with  therapist today to discuss his questions about finding employment and scheduled a visit.       - Buprenorphine & Metabolite Screen; Future  - Fentanyl Qualitative with Reflex to Quant Urine; Future  - Fentanyl Qualitative with Reflex to Quant Urine  - Buprenorphine & Metabolite Screen  - Drugs of Abuse Screen Urine (POC CUPS) POCT  - buprenorphine HCl-naloxone HCl (SUBOXONE) 2-0.5 MG per film; Place 1 Film under " the tongue daily  Dispense: 15 Film; Refill: 0     2. Nicotine use disorder  Encouraged his efforts to quit vaping, proceed with plans to  rx for nicotine gum to assist.      06/14/23 visit:  Taper still going well, taking suboxone 1 mg daily. Denies withdrawal or cravings.  Having some constipation  vaping nicotine  On summer break from school, plans to return in the Fall.   Spends time with family, not currently seeking employment    Labs discussed with patient?  Yes      [place] Prescription Drug Monitoring Program Reviewed:  Yes  06/11/2023 05/26/2023   1  Suboxone 2 Mg-0.5 Mg Sl Film 10.00  15  Li Vol  [id number]   Wal ([id number])  0/0  1.33 mg  [place]icaid [place]    06/06/2023 05/26/2023   1  Suboxone 2 Mg-0.5 Mg Sl Film 5.00  5  Li Vol  [id number]             Medications:  naloxone (NARCAN) 4 MG/0.1ML nasal spray, Spray 1 spray (4 mg) into one nostril alternating nostrils as needed for opioid reversal every 2-3 minutes until assistance arrives (Patient not taking: Reported on 6/14/2023)  nicotine polacrilex (NICORETTE) 4 MG gum, Place 1 each (4 mg) inside cheek every hour as needed for smoking cessation (Patient not taking: Reported on 5/26/2023)  SUBOXONE 2-0.5 MG per film, Take one film daily (Patient not taking: Reported on 6/14/2023)    No current facility-administered medications on file prior to visit.      No Known Allergies    PMH, PSH, FamHx reviewed      OBJECTIVE                                                      /70   Pulse 81     Physical Exam  HENT:      Head: Normocephalic.      Nose: Nose normal.   Eyes:      Conjunctiva/sclera: Conjunctivae normal.   Cardiovascular:      Rate and Rhythm: Normal rate.   Pulmonary:      Effort: Pulmonary effort is normal.   Neurological:      General: No focal deficit present.      Mental Status: He is alert and oriented to person, place, and time.      Gait: Gait is intact.   Psychiatric:         Attention and Perception: Attention normal.         Mood and  Affect: Mood is anxious.         Speech: Speech normal.         Behavior: Behavior is cooperative.         Thought Content: Thought content normal.         Judgment: Judgment normal.         Labs:    UDS:    Lab Results   Component Value Date    BUP Screen Positive (A) 06/14/2023    BZO Negative 06/14/2023    BAR Negative 06/14/2023    RAMON Negative 06/14/2023    MAMP Negative 06/14/2023    AMP Negative 06/14/2023    MDMA Negative 06/14/2023    MTD Negative 06/14/2023    [id number] Negative 06/14/2023    OXY Negative 06/14/2023    PCP Negative 06/14/2023    THC Negative 06/14/2023    TEMP 96 F 06/14/2023    SGPOCT 1.025 06/14/2023     *POC urine drug screen does not screen for Fentanyl      Recent Results (from the past 240 hour(s))   Drugs of Abuse Screen Urine (POC CUPS) POCT    Collection Time: 06/14/23 12:48 PM   Result Value Ref Range    POCT Kit Lot Number [id number]     POCT Kit Expiration Date 2024-10-27     Temperature Urine POCT 96 F 90 F, 92 F, 94 F, 96 F, 98 F, 100 F    Specific [place] POCT 1.025 1.005, 1.015, 1.025    pH Qual Urine POCT 7 pH 4 pH, 5 pH, 7 pH, 9 pH    Creatinine Qual Urine POCT 100 mg/dL 20 mg/dL, 50 mg/dL, 100 mg/dL, 200 mg/dL    Internal QC Qual Urine POCT Valid Valid    Amphetamine Qual Urine POCT Negative Negative    Barbiturate Qual Urine POCT Negative Negative    Buprenorphine Qual Urine POCT Screen Positive (A) Negative    Benzodiazepine Qual Urine POCT Negative Negative    Cocaine Qual Urine POCT Negative Negative    Methamphetamine Qual Urine POCT Negative Negative    MDMA Qual Urine POCT Negative Negative    Methadone Qual Urine POCT Negative Negative    Opiate Qual Urine POCT Negative Negative    Oxycodone Qual Urine POCT Negative Negative    Phencyclidine Qual Urine POCT Negative Negative    THC Qual Urine POCT Negative Negative           At least 30 min spent in review of medical record,  review, obtaining histories, discussing recommendations, counseling/coordination of  care    Mera Reyes, [facility]  [address] [place] [address] [id number] [phone]

## 2023-06-14 NOTE — NURSING NOTE
[facility]      Rooming information:  Approximate last use of full opioid agonist: 12/2021  Taking buprenorphine? Yes:  As prescribed? No, taking 0.5 films daily  Number of buprenorphine films/tablets remaining currently: 5-6  Side effects related to buprenorphine (constipation, dry mouth, sedation?) Yes: constipation    Narcan currently available: Yes  Other recent substance use:    Denies  NICOTINE-Yes: vaping  If using nicotine, ready to quit? Yes:     Point of care urine drug screen positive for:  Lab Results   Component Value Date    BUP Screen Positive (A) 06/14/2023    BZO Negative 06/14/2023    BAR Negative 06/14/2023    RAMON Negative 06/14/2023    MAMP Negative 06/14/2023    AMP Negative 06/14/2023    MDMA Negative 06/14/2023    MTD Negative 06/14/2023    [id number] Negative 06/14/2023    OXY Negative 06/14/2023    PCP Negative 06/14/2023    THC Negative 06/14/2023    TEMP 96 F 06/14/2023    SGPOCT 1.025 06/14/2023       *POC urine drug screen does not screen for Fentanyl          6/14/2023    12:00 PM   PHQ Assesment Total Score(s)   PHQ-9 Score 0       If PHQ-9 score of 15 or higher, has Recovery Clinic therapist or provider been notified? N/A    Any current suicidal ideation? No  If yes, has Recovery Clinic therapist or provider been notified? N/A    Primary care provider: Physician No Ref-Primary     Mental health provider: denies (follow up on MH referral if needed)    Insurance needs: active    Housing needs: stable    Contact information up to date? yes    3rd Party Involvement none today (please obtain ELBA if pt would like to include)    Mary Ray CMA  June 14, 2023  12:42 PM

## 2023-06-21 ENCOUNTER — TELEPHONE (OUTPATIENT)
Dept: ADDICTION MEDICINE | Facility: CLINIC | Age: 23
End: 2023-06-21
Payer: COMMERCIAL

## 2023-06-21 DIAGNOSIS — F11.20 OPIOID USE DISORDER, SEVERE, DEPENDENCE (H): ICD-10-CM

## 2023-06-21 RX ORDER — BUPRENORPHINE AND NALOXONE 2; .5 MG/1; MG/1
.5-1 FILM, SOLUBLE BUCCAL; SUBLINGUAL DAILY
Qty: 15 FILM | Refills: 0 | Status: SHIPPED | OUTPATIENT
Start: 2023-06-21 | End: 2023-07-03

## 2023-06-21 NOTE — TELEPHONE ENCOUNTER
Patient presented to the clinic stating that the last script sent by  was not at his [facility] pharmacy. RN reviewed the chart and it appears the last script was confirmed as electronically sent and received on 6/14/2023.     Per , last script was sold on 6/11/2023 for #10 films.     buprenorphine HCl-naloxone HCl (SUBOXONE) 2-0.5 MG per film 15 Film 0 6/14/2023  No   Sig - Route: Place 0.5-1 Film under the tongue daily - Sublingual     RN called [facility] pharmacy in [place] and they state they have the script from 6/14/23. They feel he may have ordered from an old script number. They state that they are out of this med/formulation currently however.     RN cancelled the script and will re route to [facility] per patient request.    Patient states he has been taking about 1 mg per day but did not have medication yesterday so he is feeling withdrawal symptoms.     Bucky'd for provider with new pharmacy info. Routing high priority.     Bina Hutton RN on 6/21/2023 at 10:03 AM

## 2023-06-29 ENCOUNTER — TELEPHONE (OUTPATIENT)
Dept: BEHAVIORAL HEALTH | Facility: CLINIC | Age: 23
End: 2023-06-29
Payer: COMMERCIAL

## 2023-06-29 NOTE — TELEPHONE ENCOUNTER
Reason for call:  Other   Patient called regarding (reason for call): prescription  Additional comments: pt requesting sbxn refill until next appt .came to clinic after walk in hours     Phone number to reach patient:  Home number on file [phone] (home)    Best Time:  Any     Can we leave a detailed message on this number?  YES    Travel screening: Negative

## 2023-06-29 NOTE — TELEPHONE ENCOUNTER
Writer spoke with patient who reports he scheduled an upcoming visit on 07/07/2023 and id not in need of a refill of Suboxone at this time. The last fill was on 06/21/2023.  buprenorphine HCl-naloxone HCl (SUBOXONE) 2-0.5 MG per film 15 Film 0 6/21/2023  No   Sig - Route: Place 0.5-1 Film under the tongue daily - Sublingual   Patient reports he is taking 0.5 film under the tongue daily. Patient will reach out to the Recovery Clinic if he needs a bridge of Suboxone prior to his appointment.     Jessica Belcher RN on 6/29/2023 at 3:42 PM

## 2023-07-03 DIAGNOSIS — F11.20 OPIOID USE DISORDER, SEVERE, DEPENDENCE (H): ICD-10-CM

## 2023-07-03 RX ORDER — BUPRENORPHINE AND NALOXONE 2; .5 MG/1; MG/1
.5-1 FILM, SOLUBLE BUCCAL; SUBLINGUAL DAILY
Qty: 4 FILM | Refills: 0 | Status: SHIPPED | OUTPATIENT
Start: 2023-07-03 | End: 2023-07-07

## 2023-07-03 NOTE — TELEPHONE ENCOUNTER
Reason for call:  Other   Patient called regarding (reason for call): prescription  Additional comments: pt is requesting bridge until appt on Friday     Phone number to reach patient:  Home number on file [phone] (home)    Best Time:  Any     Can we leave a detailed message on this number?  YES    Travel screening: Negative

## 2023-07-03 NOTE — TELEPHONE ENCOUNTER
Writer contacted patient who reports having 1 sl film remaining of Suboxone 2-0.5 mg and is requesting a bridge to his appointment on Friday 07/07/23. Previous discussion with patient he reported using 0.5 (1/2) film daily, however he would not be out of medication yet. Patient reported today that he does have days where he takes the remaining 1/2 film.     Refill request pended and routed to provider to further assist.     Date of Last Office Visit: 06/14/2023  Date of Next Office Visit: 07/07/2023  No shows since last visit: 06/28/23  Cancellations since last visit: None    Medication requested:   buprenorphine HCl-naloxone HCl (SUBOXONE) 2-0.5 MG per film 15 Film 0 6/21/2023  No   Sig - Route: Place 0.5-1 Film under the tongue daily - Sublingual      Date last ordered: 06/21/2023 Qty: 15 Refills: 0     Review of [place] ?: Yes  Medication last sold date: 06/21/23 Qty filled: 15  Other controlled substance on [place] ?: No    Lapse in medication adherence greater than 5 days?: No  If yes, call patient and gather details: See note above  Medication refill request verified as identical to current order?: No  Result of Last DAM, VPA, Li+ Level, CBC, or Carbamazepine Level (at or since last visit): N/A    Last visit treatment plan:   1. Opioid use disorder, severe, dependence (H)  Continues to take suboxone 1 mg daily w/o side effects or cravings.   -Plans to continue suboxone 1 mg daily for now, and he may reduce his dose to 0.5 mg daily.   -Strongly recommend that he return to clinic before stopping suboxone completely.  -Discussed that increase life stressors can trigger more a return of using thoughts/cravings and encouraged him to resume suboxone if he begins to experience increase thoughts/cravings.   - Drugs of Abuse Screen Urine (POC CUPS) POCT  - buprenorphine HCl-naloxone HCl (SUBOXONE) 2-0.5 MG per film; Place 0.5-1 Film under the tongue daily  Dispense: 15 Film; Refill: 0     2. Therapeutic opioid-induced  constipation (OIC)  - SENNA-docusate sodium (SENNA S) 8.6-50 MG tablet; Take 1 tablet by mouth daily as needed (constipation)  Dispense: 30 tablet; Refill: 1                   Return in about 2 weeks (around 6/28/2023) for Follow up, in person.      []Medication refilled per  Medication Refill in Ambulatory Care  policy.  [x]Medication unable to be refilled by RN due to criteria not met as indicated below:    []Eligibility - not seen in the last year   []Supervision - no future appointment   []Compliance - no shows, cancellations or lapse in therapy   []Verification - order discrepancy   [x]Controlled medication   []Medication not included in policy   []90-day supply request   []Other

## 2023-07-03 NOTE — TELEPHONE ENCOUNTER
Writer notified patient via telephone that the requested bridge for Suboxone was sent to the pharmacy.    Jessica Belcher RN on 7/3/2023 at 4:48 PM

## 2023-07-07 ENCOUNTER — OFFICE VISIT (OUTPATIENT)
Dept: BEHAVIORAL HEALTH | Facility: CLINIC | Age: 23
End: 2023-07-07
Payer: COMMERCIAL

## 2023-07-07 VITALS — HEART RATE: 86 BPM | DIASTOLIC BLOOD PRESSURE: 77 MMHG | SYSTOLIC BLOOD PRESSURE: 116 MMHG

## 2023-07-07 DIAGNOSIS — F17.200 NICOTINE USE DISORDER: ICD-10-CM

## 2023-07-07 DIAGNOSIS — F11.20 OPIOID USE DISORDER, SEVERE, DEPENDENCE (H): Primary | ICD-10-CM

## 2023-07-07 PROCEDURE — H0038 SELF-HELP/PEER SVC PER 15MIN: HCPCS

## 2023-07-07 PROCEDURE — 80305 DRUG TEST PRSMV DIR OPT OBS: CPT | Performed by: FAMILY MEDICINE

## 2023-07-07 PROCEDURE — 99214 OFFICE O/P EST MOD 30 MIN: CPT | Performed by: FAMILY MEDICINE

## 2023-07-07 RX ORDER — BUPRENORPHINE AND NALOXONE 2; .5 MG/1; MG/1
.5-1 FILM, SOLUBLE BUCCAL; SUBLINGUAL DAILY
Qty: 20 FILM | Refills: 0 | Status: SHIPPED | OUTPATIENT
Start: 2023-07-07 | End: 2023-07-20

## 2023-07-07 ASSESSMENT — PATIENT HEALTH QUESTIONNAIRE - PHQ9: SUM OF ALL RESPONSES TO PHQ QUESTIONS 1-9: 0

## 2023-07-07 NOTE — NURSING NOTE
[facility]      Rooming information:  Approximate last use of full opioid agonist: 12/2021  Taking buprenorphine? Yes:  As prescribed? Yes:   Number of buprenorphine films/tablets remaining currently: half film   Side effects related to buprenorphine (constipation, dry mouth, sedation?) No   Narcan currently available: Yes  Other recent substance use:    Denies  NICOTINE-Yes:   If using nicotine, ready to quit? Yes:     Point of care urine drug screen positive for:  Lab Results   Component Value Date    BUP Screen Positive (A) 07/07/2023    BZO Negative 07/07/2023    BAR Negative 07/07/2023    RAMON Negative 07/07/2023    MAMP Negative 07/07/2023    AMP Negative 07/07/2023    MDMA Negative 07/07/2023    MTD Negative 07/07/2023    [id number] Negative 07/07/2023    OXY Negative 07/07/2023    PCP Negative 07/07/2023    THC Negative 07/07/2023    TEMP 90 F 07/07/2023    SGPOCT 1.025 07/07/2023       *POC urine drug screen does not screen for Fentanyl          7/7/2023     1:03 PM   PHQ Assesment Total Score(s)   PHQ-9 Score 0       If PHQ-9 score of 15 or higher, has Recovery Clinic therapist or provider been notified? N/A    Any current suicidal ideation? No  If yes, has Recovery Clinic therapist or provider been notified? N/A    Primary care provider: Physician No Ref-Primary     Mental health provider: denies  (follow up on MH referral if needed)    Insurance needs: active     Housing needs: stable     Contact information up to date? yes    3rd Party Involvement not at this time  (please obtain ELBA if pt would like to include)    Shelby Condon MA  July 7, 2023  1:00 PM

## 2023-07-07 NOTE — PROGRESS NOTES
[facility] Follow Up    ASSESSMENT/PLAN                                                      Diagnoses and all orders for this visit:  Opioid use disorder, severe, dependence (H)  -     Drugs of Abuse Screen Urine (POC CUPS) POCT  -     Comprehensive metabolic panel (BMP + Alb, Alk Phos, ALT, AST, Total. Bili, TP); Future  -     buprenorphine HCl-naloxone HCl (SUBOXONE) 2-0.5 MG per film; Place 0.5-1 Film under the tongue daily  Nicotine use disorder      Orders Placed This Encounter   Medications     buprenorphine HCl-naloxone HCl (SUBOXONE) 2-0.5 MG per film     Sig: Place 0.5-1 Film under the tongue daily     Dispense:  20 Film     Refill:  0       - Increase Rx quantity from #15 -> #20 this month d/t recurrent breakthrough withdrawals  - Continue goal of 1/2 film daily with option for 2nd dose with recurrent withdrawals. Shared decision-making lead to quantity of 20  - Possible new sx related to increased physical efforts with more hours at [employer]  - [facility] for routine monitoring  - Not ready to change vaping today, working on cessation slowly  - Wants to get back to school in the fall after working this summer. Probably best to avoid full taper until he has been stable at school for some time.      Patient counseling completed today:  Discussed mechanism of action, potential risks/benefits/side effects of medications and other recommendations above.    Reviewed directions for initiation of buprenorphine to reduce risk of precipitated withdrawal and maximize efficacy.    Harm reduction counseling including never use alone, availability of naloxone, risks associated with concurrent use of opioids and benzodiazepines, alcohol, or other sedatives, safer administration as applicable.  Discussed importance of avoiding isolation, building a network of supportive relationships, avoiding people/places/things associated with past use to reduce risk of relapse; including motivational interviewing  regarding psychosocial treatment for addiction.   SUBJECTIVE                                                          CC/HPI:  Reji Goldstein is a 23 year old male with PMH nicotine use disorder and opioid use disorder on buprenorphine taper per his request who presents to the Recovery Clinic for return visit.       Brief History:  Reji Goldstein was first seen in Recovery Clinic on 05/15/23. They were referred by staff from [facility] ED after pt was seen there in 3/2023 requesting rx for buprenorphine.   Patient's reasons for seeking treatment on this date include wanting to continue taper off buprenorphine.     Pt started buprenorphine for treatment of OUD through [facility] in 12/2021.  Max dose 16mg/day.  He began tapering dose on his own in Fall, 2022.  He has continued to decrease dose since that time  Most recently he reports taking buprenorphine 2.5mg/day for the past 2-3 weeks.  He has experienced withdrawal symptoms in previous attempts to stop buprenorphine altogether.  He denies return to use of fentanyl since 12/2021.  He also denies cravings.       Substance Use History :  Opioids:   Age at first use: 19  Current use: substance: Perc 30's ; quantity stated never counted but 10 pills would be the max he would use daily ; route: inhalation  ; timing of last use: 12/2021;                 IV drug use: No   History of overdose: Yes: one episode in a vehicle with friends 7/2020 x2  Previous residential or outpatient treatments for addiction : Yes: [facility] in 2021 in patient   Previous medication treatments for addiction: Yes: Currently is taking Suboxone   Longest period of sobriety: currently around 1.5 years   Medical complications related to substance use: denies   Hepatitis C: no record of testing  HIV: 8/14/2019 HIV ag/ab nonreactive     Other Addiction History:  Stimulants   Denies   Sedatives/hypnotics/anxiolytics:   Denies   Alcohol:   Denies   Nicotine:   currently is vaping   Cannabis:   Denies    Hallucinogens/Dissociatives:   Denies   Eating disorder:  Denies   Gambling:              Denies         PAST PSYCHIATRIC HISTORY:  Diagnoses- denies   Suicide Attempts: No   Hospitalizations: No           5/26/2023    11:00 AM 6/14/2023    12:00 PM 7/7/2023     1:03 PM   PHQ   PHQ-9 Total Score 1 0 0   Q9: Thoughts of better off dead/self-harm past 2 weeks Not at all Not at all Not at all       Social History  Housing status: with family father and 8 siblings  Employment status: working at [employer] part-time; plans to start college in the fall studying nursing  Relationship status: Single  Children: no children  Legal: denies       Most recent Recovery Clinic visit Jun 14, 2023.    A/P last visit:  1. Opioid use disorder, severe, dependence (H)  Continues to take suboxone 1 mg daily w/o side effects or cravings.   -Plans to continue suboxone 1 mg daily for now, and he may reduce his dose to 0.5 mg daily.   -Strongly recommend that he return to clinic before stopping suboxone completely.  -Discussed that increase life stressors can trigger more a return of using thoughts/cravings and encouraged him to resume suboxone if he begins to experience increase thoughts/cravings.   - Drugs of Abuse Screen Urine (POC CUPS) POCT  - buprenorphine HCl-naloxone HCl (SUBOXONE) 2-0.5 MG per film; Place 0.5-1 Film under the tongue daily  Dispense: 15 Film; Refill: 0     2. Therapeutic opioid-induced constipation (OIC)  - SENNA-docusate sodium (SENNA S) 8.6-50 MG tablet; Take 1 tablet by mouth daily as needed (constipation)  Dispense: 30 tablet; Refill: 1                   Return in about 2 weeks (around 6/28/2023) for Follow up, in person.      07/07/23 visit:  - Occasional withdrawals. Working more/longer hours at [employer] (part-time). Experiences mild withdrawals, mostly sweats  - Has taken an extra 1/2 film for a total of 5 days this past month  - No cravings or concerns with suboxone otherwise  - Constipation well-controlled  -  Not wanting to change nicotine use today  -  stable, no other use    Labs discussed with patient?  Yes      [place] Prescription Drug Monitoring Program Reviewed:  Yes; as expected    Medications:  buprenorphine HCl-naloxone HCl (SUBOXONE) 2-0.5 MG per film, Place 0.5-1 Film under the tongue daily  naloxone (NARCAN) 4 MG/0.1ML nasal spray, Spray 1 spray (4 mg) into one nostril alternating nostrils as needed for opioid reversal every 2-3 minutes until assistance arrives (Patient not taking: Reported on 6/14/2023)  nicotine polacrilex (NICORETTE) 4 MG gum, Place 1 each (4 mg) inside cheek every hour as needed for smoking cessation (Patient not taking: Reported on 5/26/2023)  SENNA-docusate sodium (SENNA S) 8.6-50 MG tablet, Take 1 tablet by mouth daily as needed (constipation) (Patient not taking: Reported on 7/7/2023)  SUBOXONE 2-0.5 MG per film, Take one film daily (Patient not taking: Reported on 6/14/2023)    No current facility-administered medications on file prior to visit.      No Known Allergies    PMH, PSH, FamHx reviewed      OBJECTIVE                                                      /77   Pulse 86     Physical Exam   NAD, tired, mood otherwise positive, insight appropriate    Labs:    UDS:    Lab Results   Component Value Date    BUP Screen Positive (A) 07/07/2023    BZO Negative 07/07/2023    BAR Negative 07/07/2023    RAMON Negative 07/07/2023    MAMP Negative 07/07/2023    AMP Negative 07/07/2023    MDMA Negative 07/07/2023    MTD Negative 07/07/2023    [id number] Negative 07/07/2023    OXY Negative 07/07/2023    PCP Negative 07/07/2023    THC Negative 07/07/2023    TEMP 90 F 07/07/2023    SGPOCT 1.025 07/07/2023     *POC urine drug screen does not screen for Fentanyl      Recent Results (from the past 240 hour(s))   Drugs of Abuse Screen Urine (POC CUPS) POCT    Collection Time: 07/07/23  1:06 PM   Result Value Ref Range    POCT Kit Lot Number [id number]     POCT Kit Expiration Date [id number]      Temperature Urine POCT 90 F 90 F, 92 F, 94 F, 96 F, 98 F, 100 F    Specific [place] POCT 1.025 1.005, 1.015, 1.025    pH Qual Urine POCT 7 pH 4 pH, 5 pH, 7 pH, 9 pH    Creatinine Qual Urine POCT 50 mg/dL 20 mg/dL, 50 mg/dL, 100 mg/dL, 200 mg/dL    Internal QC Qual Urine POCT Valid Valid    Amphetamine Qual Urine POCT Negative Negative    Barbiturate Qual Urine POCT Negative Negative    Buprenorphine Qual Urine POCT Screen Positive (A) Negative    Benzodiazepine Qual Urine POCT Negative Negative    Cocaine Qual Urine POCT Negative Negative    Methamphetamine Qual Urine POCT Negative Negative    MDMA Qual Urine POCT Negative Negative    Methadone Qual Urine POCT Negative Negative    Opiate Qual Urine POCT Negative Negative    Oxycodone Qual Urine POCT Negative Negative    Phencyclidine Qual Urine POCT Negative Negative    THC Qual Urine POCT Negative Negative           Bart Griggs MD    [facility]  [address] [place] [id number] [phone]

## 2023-07-07 NOTE — PROGRESS NOTES
[facility]    Peer  met with Reji Goldstein in the Recovery Clinic to introduce himself, detail services provided and discuss current status of recovery. Pt appeared alert, oriented and open to feedback during our discussion.  PRC sees patient today under provider diagnosis of opioid substance disorder, serve, dependence  H    Pt arrives for visit with provider for suboxone refill.   Reji reported recovery going well.  Writer praised Pt on recovery progress.  Reji declined other resources.       PRC provided business card to pt welcoming contact for recovery based support and resources. PRC and pt agree to speak again during an upcoming  visit.           Service Type:     Individual     Session Start Time:    1:00 pm                   Session End Time: 1:15 pm    Session Length:        15 mins     Patient Goal:   To utilize suboxone assisted treatment for sobriety and long term recovery.     Goal Progress:   Ongoing.    Key Risk Factors to Recovery:   PRC encourages being aware of risk factors that can lead to re-use which include avoiding isolation, avoiding triggers and managing cravings in a healthy manner. being open and willing to acceptance and change on a daily basis.  PRC encourages pt to establish a sober network calling tree to reach out to when needed.  Continue to practice honesty with ourselves and trusted support person(s).   PRC encourages regular attendance at recovery based meetings as well as finding a sponsor for mentoring and accountability.   PRC encourages consideration of other services such as counseling for mental health issues which can correlate with our substance use.      Support Needs:   Ongoing care, support and resources for opioid substance use disorder.     Follow up:   PRC has provided pt with his contact information for support and resource needs.    PRC and pt agree to meet during an upcoming  visit.       [facility]  Clinic  [address], [address]   [place], [place], [address]  Clinic Phone: [phone]  Clinic Fax: [phone]  Peer  phone: [phone]    Open Monday - Friday  9:00am-4:00pm  Walk in hours: 9am-3pm      Iona Olivares  July 7, 2023  3:55 PM    MAURI Vigil provides clinical oversite and supervision of care.

## 2023-07-20 ENCOUNTER — OFFICE VISIT (OUTPATIENT)
Dept: BEHAVIORAL HEALTH | Facility: CLINIC | Age: 23
End: 2023-07-20
Payer: COMMERCIAL

## 2023-07-20 VITALS
RESPIRATION RATE: 18 BRPM | SYSTOLIC BLOOD PRESSURE: 115 MMHG | OXYGEN SATURATION: 97 % | HEART RATE: 87 BPM | DIASTOLIC BLOOD PRESSURE: 74 MMHG

## 2023-07-20 DIAGNOSIS — F11.20 OPIOID USE DISORDER, SEVERE, DEPENDENCE (H): ICD-10-CM

## 2023-07-20 DIAGNOSIS — F17.200 NICOTINE USE DISORDER: Primary | ICD-10-CM

## 2023-07-20 PROCEDURE — 80305 DRUG TEST PRSMV DIR OPT OBS: CPT | Performed by: FAMILY MEDICINE

## 2023-07-20 PROCEDURE — 99214 OFFICE O/P EST MOD 30 MIN: CPT | Performed by: FAMILY MEDICINE

## 2023-07-20 RX ORDER — BUPRENORPHINE AND NALOXONE 2; .5 MG/1; MG/1
1-2 FILM, SOLUBLE BUCCAL; SUBLINGUAL DAILY
Qty: 20 FILM | Refills: 0 | Status: SHIPPED | OUTPATIENT
Start: 2023-07-20 | End: 2023-08-04

## 2023-07-20 ASSESSMENT — PATIENT HEALTH QUESTIONNAIRE - PHQ9: SUM OF ALL RESPONSES TO PHQ QUESTIONS 1-9: 0

## 2023-07-20 ASSESSMENT — PAIN SCALES - GENERAL: PAINLEVEL: NO PAIN (0)

## 2023-07-20 NOTE — NURSING NOTE
[facility]      Rooming information:  Approximate last use of full opioid agonist: 12/20/2021  Taking buprenorphine? Yes: buprenorphine HCl-naloxone HCl (SUBOXONE) 2-0.5 MG per film As prescribed? Yes: Sig - Route: Place 0.5-1 Film under the tongue daily - Sublingual  Number of buprenorphine films/tablets remaining currently: 2  Side effects related to buprenorphine (constipation, dry mouth, sedation?) No   Narcan currently available: Yes  Other recent substance use:    Denies  NICOTINE-Yes:   If using nicotine, ready to quit? Yes: nicorette gum    Point of care urine drug screen positive for:  Lab Results   Component Value Date    BUP Screen Positive (A) 07/07/2023    BZO Negative 07/07/2023    BAR Negative 07/07/2023    RAMON Negative 07/07/2023    MAMP Negative 07/07/2023    AMP Negative 07/07/2023    MDMA Negative 07/07/2023    MTD Negative 07/07/2023    [id number] Negative 07/07/2023    OXY Negative 07/07/2023    PCP Negative 07/07/2023    THC Negative 07/07/2023    TEMP 90 F 07/07/2023    SGPOCT 1.025 07/07/2023       *POC urine drug screen does not screen for Fentanyl          7/7/2023     1:03 PM   PHQ Assesment Total Score(s)   PHQ-9 Score 0       If PHQ-9 score of 15 or higher, has Recovery Clinic therapist or provider been notified? No    Any current suicidal ideation? No  If yes, has Recovery Clinic therapist or provider been notified? N/A    Primary care provider: Physician No Ref-Primary     Mental health provider: denies (follow up on MH referral if needed)    Insurance needs: active    Housing needs: stable    Contact information up to date? yes    3rd Party Involvement not at this time (please obtain ELBA if pt would like to include)    Kathleen Rodriguez LPN  July 20, 2023  2:11 PM

## 2023-07-20 NOTE — PROGRESS NOTES
[facility] Follow Up    ASSESSMENT/PLAN                                                    1. Opioid use disorder, severe, dependence (H)  Pt states he has been taking 2mg buprenorphine/day for the last week, cites stress related to work schedule.  Based on , pt's report of remaining films, possible pt is taking 2-4mg/day.  States he wants to continue tapering dose of buprenorphine.    Recommend he continue buprenorphine 2mg/day at least for the next week.  He states in one week he would like to lower dose again.  If he does choose to lower dose, recommend he not decrease below 1.5mg (3/4 film) daily and continue that dose at least 2 weeks before decreasing further.    rx was written to take up to 4mg/day to facilitate pt being able to fill his rx today as it would have been about one week early based on previous rx.   Pt has follow-up already scheduled 8/4/23.    Discussed option for transfer to XR buprenorphine, pt is not interested at this time.  States he would be interested if his efforts to taper off SL buprenorphine are unsuccessful.    Reviewed risk of return to use with discontinuation of therapy, that tapering is not required, and taper is reversible at any time.    - Drugs of Abuse Screen Urine (POC CUPS) POCT  - buprenorphine HCl-naloxone HCl (SUBOXONE) 2-0.5 MG per film; Place 1-2 Film under the tongue daily  Dispense: 20 Film; Refill: 0    2. Nicotine use disorder  Using nicotine gum only     Return in 15 days (on 8/4/2023) for Follow up, in person.      Patient counseling completed today:  Discussed mechanism of action, potential risks/benefits/side effects of medications and other recommendations above.     Harm reduction counseling including never use alone, availability of naloxone, risks associated with concurrent use of opioids and benzodiazepines, alcohol, or other sedatives, safer administration as applicable.  Discussed importance of avoiding isolation, building a  network of supportive relationships, avoiding people/places/things associated with past use to reduce risk of relapse  SUBJECTIVE                                                          CC/HPI:  Reji Goldstein is a 23 year old male with PMH nicotine use disorder and opioid use disorder on buprenorphine taper per his request who presents to the Recovery Clinic for return visit.       Brief History:  Reji Goldstein was first seen in Recovery Clinic on 05/15/23. They were referred by staff from [facility] ED after pt was seen there in 3/2023 requesting rx for buprenorphine.   Patient's reasons for seeking treatment on this date include wanting to continue taper off buprenorphine.     Pt started buprenorphine for treatment of OUD through [facility] in 12/2021.  Max dose 16mg/day.  He began tapering dose on his own in Fall, 2022.  He has continued to decrease dose since that time.  He denies return to use of fentanyl since 12/2021.       Substance Use History :  Opioids:   Age at first use: 19  Current use: substance: Perc 30's ; quantity stated never counted but 10 pills would be the max he would use daily ; route: inhalation  ; timing of last use: 12/2021;                 IV drug use: No   History of overdose: Yes: one episode in a vehicle with friends 7/2020 x2  Previous residential or outpatient treatments for addiction : Yes: kerrie in 2021 in patient   Previous medication treatments for addiction: Yes: Currently is taking Suboxone   Longest period of sobriety: 12/2021 to present  Medical complications related to substance use: denies   Hepatitis C: no record of testing  HIV: 8/14/2019 HIV ag/ab nonreactive     Other Addiction History:  Stimulants   Denies   Sedatives/hypnotics/anxiolytics:   Denies   Alcohol:   Denies   Nicotine:   H/o vaping  Cannabis:   Denies   Hallucinogens/Dissociatives:   Denies   Eating disorder:  Denies   Gambling:              Denies         PAST PSYCHIATRIC HISTORY:  Diagnoses- denies   Suicide  Attempts: No   Hospitalizations: No           6/14/2023    12:00 PM 7/7/2023     1:03 PM 7/20/2023     2:00 PM   PHQ   PHQ-9 Total Score 0 0 0   Q9: Thoughts of better off dead/self-harm past 2 weeks Not at all Not at all Not at all       Social History  Housing status: with family father and 8 siblings  Employment status: working at [employer] part-time; plans to start college in the fall studying nursing  Relationship status: Single  Children: no children  Legal: denies       Most recent Recovery Clinic visit 7/7/23:    A/P last visit:  Opioid use disorder, severe, dependence (H)  -     Drugs of Abuse Screen Urine (POC CUPS) POCT  -     Comprehensive metabolic panel (BMP + Alb, Alk Phos, ALT, AST, Total. Bili, TP); Future  -     buprenorphine HCl-naloxone HCl (SUBOXONE) 2-0.5 MG per film; Place 0.5-1 Film under the tongue daily  Nicotine use disorder          Orders Placed This Encounter   Medications     buprenorphine HCl-naloxone HCl (SUBOXONE) 2-0.5 MG per film       Sig: Place 0.5-1 Film under the tongue daily       Dispense:  20 Film       Refill:  0      - Increase Rx quantity from #15 -> #20 this month d/t recurrent breakthrough withdrawals  - Continue goal of 1/2 film daily with option for 2nd dose with recurrent withdrawals. Shared decision-making lead to quantity of 20  - Possible new sx related to increased physical efforts with more hours at [employer]  - [facility] for routine monitoring  - Not ready to change vaping today, working on cessation slowly  - Wants to get back to school in the fall after working this summer. Probably best to avoid full taper until he has been stable at school for some time.           07/20/23 visit:  Pt states he has been taking up to 2mg buprenorphine/day since last visit, and for the last week has taken 2mg/day.  Reports a lot of fatigue and stress related to his job.  Working 8p-12:30-1am at [employer].  Did not want to decrease his dose due to stress.   States he would like to continue  taper of buprenorphine in about one week.   Based on  and pt's description of remaining films, asked pt if he were taking more than one film per day with assurance that would be the right action if he were experiencing withdrawal symptoms or cravings, which he reiterated he had never taken more than one film per day.       [place] Prescription Drug Monitoring Program Reviewed:  Yes; as expected  07/07/2023 07/07/2023   2  Suboxone 2 Mg-0.5 Mg Sl Film 20.00  20  [facility]  [id number]   Romel ([id number])  0/0  2.00        Medications:  buprenorphine HCl-naloxone HCl (SUBOXONE) 2-0.5 MG per film, Place 0.5-1 Film under the tongue daily  naloxone (NARCAN) 4 MG/0.1ML nasal spray, Spray 1 spray (4 mg) into one nostril alternating nostrils as needed for opioid reversal every 2-3 minutes until assistance arrives (Patient not taking: Reported on 6/14/2023)  nicotine polacrilex (NICORETTE) 4 MG gum, Place 1 each (4 mg) inside cheek every hour as needed for smoking cessation (Patient not taking: Reported on 5/26/2023)  SENNA-docusate sodium (SENNA S) 8.6-50 MG tablet, Take 1 tablet by mouth daily as needed (constipation) (Patient not taking: Reported on 7/7/2023)    No current facility-administered medications on file prior to visit.      No Known Allergies    PMH, PSH, FamHx reviewed      OBJECTIVE                                                      /74 (BP Location: Right arm, Patient Position: Sitting, Cuff Size: Adult Regular)   Pulse 87   Resp 18   SpO2 97%     Physical Exam  Constitutional:       Appearance: Normal appearance.   HENT:      Head: Normocephalic and atraumatic.      Nose: No rhinorrhea.   Eyes:      General: No scleral icterus.     Extraocular Movements: Extraocular movements intact.   Pulmonary:      Effort: Pulmonary effort is normal.   Neurological:      Mental Status: He is alert and oriented to person, place, and time.      Coordination: Coordination is intact.      Gait: Gait is intact.    Psychiatric:         Attention and Perception: Attention and perception normal.         Mood and Affect: Mood normal. Affect is flat.         Speech: Speech normal.         Behavior: Behavior is cooperative.         Thought Content: Thought content normal.      Comments: Insight and judgement are fair to good          Labs:    UDS:    Lab Results   Component Value Date    BUP Screen Positive (A) 07/20/2023    BZO Negative 07/20/2023    BAR Negative 07/20/2023    RAMON Negative 07/20/2023    MAMP Negative 07/20/2023    AMP Negative 07/20/2023    MDMA Negative 07/20/2023    MTD Negative 07/20/2023    [id number] Negative 07/20/2023    OXY Negative 07/20/2023    PCP Negative 07/20/2023    THC Negative 07/20/2023    TEMP 90 F 07/20/2023    SGPOCT 1.025 07/20/2023     *POC urine drug screen does not screen for Fentanyl      Recent Results (from the past 240 hour(s))   Drugs of Abuse Screen Urine (POC CUPS) POCT    Collection Time: 07/20/23  2:20 PM   Result Value Ref Range    POCT Kit Lot Number [id number]     POCT Kit Expiration Date 2024-11-20     Temperature Urine POCT 90 F 90 F, 92 F, 94 F, 96 F, 98 F, 100 F    Specific [place] POCT 1.025 1.005, 1.015, 1.025    pH Qual Urine POCT 5 pH 4 pH, 5 pH, 7 pH, 9 pH    Creatinine Qual Urine POCT 100 mg/dL 20 mg/dL, 50 mg/dL, 100 mg/dL, 200 mg/dL    Internal QC Qual Urine POCT Valid Valid    Amphetamine Qual Urine POCT Negative Negative    Barbiturate Qual Urine POCT Negative Negative    Buprenorphine Qual Urine POCT Screen Positive (A) Negative    Benzodiazepine Qual Urine POCT Negative Negative    Cocaine Qual Urine POCT Negative Negative    Methamphetamine Qual Urine POCT Negative Negative    MDMA Qual Urine POCT Negative Negative    Methadone Qual Urine POCT Negative Negative    Opiate Qual Urine POCT Negative Negative    Oxycodone Qual Urine POCT Negative Negative    Phencyclidine Qual Urine POCT Negative Negative    THC Qual Urine POCT Negative Negative           Erica Padron  MD  Addiction Medicine  [facility]  [address] [place] [address] [address] [place] [id number]  [phone]

## 2023-07-31 ENCOUNTER — TELEPHONE (OUTPATIENT)
Dept: BEHAVIORAL HEALTH | Facility: CLINIC | Age: 23
End: 2023-07-31
Payer: COMMERCIAL

## 2023-08-04 ENCOUNTER — OFFICE VISIT (OUTPATIENT)
Dept: BEHAVIORAL HEALTH | Facility: CLINIC | Age: 23
End: 2023-08-04
Payer: COMMERCIAL

## 2023-08-04 VITALS
OXYGEN SATURATION: 99 % | WEIGHT: 173 LBS | SYSTOLIC BLOOD PRESSURE: 113 MMHG | BODY MASS INDEX: 25.55 KG/M2 | HEART RATE: 81 BPM | DIASTOLIC BLOOD PRESSURE: 76 MMHG

## 2023-08-04 DIAGNOSIS — F11.20 OPIOID USE DISORDER, SEVERE, DEPENDENCE (H): ICD-10-CM

## 2023-08-04 DIAGNOSIS — F17.200 NICOTINE USE DISORDER: ICD-10-CM

## 2023-08-04 DIAGNOSIS — K59.03 THERAPEUTIC OPIOID-INDUCED CONSTIPATION (OIC): ICD-10-CM

## 2023-08-04 DIAGNOSIS — T40.2X5A THERAPEUTIC OPIOID-INDUCED CONSTIPATION (OIC): ICD-10-CM

## 2023-08-04 PROCEDURE — 80305 DRUG TEST PRSMV DIR OPT OBS: CPT | Performed by: FAMILY MEDICINE

## 2023-08-04 PROCEDURE — 99214 OFFICE O/P EST MOD 30 MIN: CPT | Performed by: FAMILY MEDICINE

## 2023-08-04 RX ORDER — BUPRENORPHINE AND NALOXONE 2; .5 MG/1; MG/1
1 FILM, SOLUBLE BUCCAL; SUBLINGUAL DAILY
Qty: 10 FILM | Refills: 0 | Status: SHIPPED | OUTPATIENT
Start: 2023-08-04 | End: 2023-08-14

## 2023-08-04 RX ORDER — SENNA AND DOCUSATE SODIUM 50; 8.6 MG/1; MG/1
1 TABLET, FILM COATED ORAL DAILY PRN
Qty: 30 TABLET | Refills: 1 | Status: SHIPPED | OUTPATIENT
Start: 2023-08-04 | End: 2023-09-27

## 2023-08-04 ASSESSMENT — PAIN SCALES - GENERAL: PAINLEVEL: NO PAIN (0)

## 2023-08-04 ASSESSMENT — PATIENT HEALTH QUESTIONNAIRE - PHQ9: SUM OF ALL RESPONSES TO PHQ QUESTIONS 1-9: 0

## 2023-08-04 NOTE — PROGRESS NOTES
" [facility]      Rooming information: says he has been working a lot.  Approximate last use of full opioid agonist: December 2021  Taking buprenorphine? Yes:  As prescribed? Yes: has been using 0.5 film every day since last Friday  Number of buprenorphine films/tablets remaining currently: at least 5  Side effects related to buprenorphine (constipation, dry mouth, sedation?) Yes: chills and constipation, says did not have chills when he was taking 1 film a day  Narcan currently available: Yes  Other recent substance use:    Denies  NICOTINE-Yes: vape  If using nicotine, ready to quit? Yes: \" I am trying\"    Point of care urine drug screen positive for:  Lab Results   Component Value Date    BUP Screen Positive (A) 08/04/2023    BZO Negative 08/04/2023    BAR Negative 08/04/2023    RAMON Negative 08/04/2023    MAMP Negative 08/04/2023    AMP Negative 08/04/2023    MDMA Negative 08/04/2023    MTD Negative 08/04/2023    [id number] Negative 08/04/2023    OXY Negative 08/04/2023    PCP Negative 08/04/2023    THC Negative 08/04/2023    TEMP 94 F 08/04/2023    SGPOCT 1.025 08/04/2023 8/4/2023     1:00 PM   PHQ Assesment Total Score(s)   PHQ-9 Score 0       If PHQ-9 score of 15 or higher, has Recovery Clinic therapist or provider been notified? No    Any current suicidal ideation? No  If yes, has Recovery Clinic therapist or provider been notified? N/A    Primary care provider: Physician No Ref-Primary     Mental health provider: none (follow up on MH referral if needed)    Insurance needs: active    Housing needs: stable    Contact information up to date? yes    3rd Party Involvement NA (please obtain ELBA if pt would like to include)    Sravanthi Wagner LPN  August 4, 2023  1:20 PM   "

## 2023-08-04 NOTE — PATIENT INSTRUCTIONS
Alternate 1/3 and 1/4 of film for 1-2 weeks, then take 1/4 film daily.    [facility]  [address], [address]   [place], [place], [address]  Phone: [phone]  Fax: [phone]    Open Monday-Friday  Closed over lunch hour  Walk in hours: 9am-11:30am and 12:30-3pm

## 2023-08-04 NOTE — PROGRESS NOTES
[facility] Follow Up    ASSESSMENT/PLAN                                                      1. Opioid use disorder, severe, dependence (H)  Stable.  Desires continued taper.  Has been taking 1/3 film daily.  Plan to alternate between 1/3 and 1/4 of a film daily for 1-2 weeks and then drop to 1/4 film daily.  Reviewed option of Sublocade for taper, he will consider if unable to complete taper with films.  Reviewed OK to increase dose back to full film if increase of cravings or withdrawal.  Has Narcan.    - Drugs of Abuse Screen Urine (POC CUPS) POCT  - buprenorphine HCl-naloxone HCl (SUBOXONE) 2-0.5 MG per film; Place 1 Film under the tongue daily  Dispense: 10 Film; Refill: 0    2. Nicotine use disorder  Encouraged continued efforts.  - nicotine polacrilex (NICORETTE) 4 MG gum; Place 1 each (4 mg) inside cheek every hour as needed for smoking cessation  Dispense: 240 each; Refill: 3    3. Therapeutic opioid-induced constipation (OIC)  Stable, continue Senna as needed.    - SENNA-docusate sodium (SENNA S) 8.6-50 MG tablet; Take 1 tablet by mouth daily as needed (constipation)  Dispense: 30 tablet; Refill: 1           Return in about 2 weeks (around 8/18/2023) for Follow up, in person.    Patient counseling completed today:  Discussed mechanism of action, potential risks/benefits/side effects of medications and other recommendations above.    Reviewed directions for initiation of buprenorphine to reduce risk of precipitated withdrawal and maximize efficacy.    Harm reduction counseling including never use alone, availability of naloxone, risks associated with concurrent use of opioids and benzodiazepines, alcohol, or other sedatives, safer administration as applicable.  Discussed importance of avoiding isolation, building a network of supportive relationships, avoiding people/places/things associated with past use to reduce risk of relapse; including motivational interviewing regarding  psychosocial treatment for addiction.   SUBJECTIVE                                                          CC/HPI:  Reji Goldstein is a 23 year old male with PMH nicotine use disorder and opioid use disorder on buprenorphine taper per his request who presents to the Recovery Clinic for return visit.       Brief History:  Reji Goldstein was first seen in Recovery Clinic on 05/15/23. They were referred by staff from [facility] ED after pt was seen there in 3/2023 requesting rx for buprenorphine.   Patient's reasons for seeking treatment on this date include wanting to continue taper off buprenorphine.     Pt started buprenorphine for treatment of OUD through [facility] in 12/2021.  Max dose 16mg/day.  He began tapering dose on his own in Fall, 2022.  He has continued to decrease dose since that time.  He denies return to use of fentanyl since 12/2021.       Substance Use History :  Opioids:   Age at first use: 19  Current use: substance: Perc 30's ; quantity stated never counted but 10 pills would be the max he would use daily ; route: inhalation  ; timing of last use: 12/2021;                 IV drug use: No   History of overdose: Yes: one episode in a vehicle with friends 7/2020 x2  Previous residential or outpatient treatments for addiction : Yes: kerrie in 2021 in patient   Previous medication treatments for addiction: Yes: Currently is taking Suboxone   Longest period of sobriety: 12/2021 to present  Medical complications related to substance use: denies   Hepatitis C: no record of testing  HIV: 8/14/2019 HIV ag/ab nonreactive     Other Addiction History:  Stimulants   Denies   Sedatives/hypnotics/anxiolytics:   Denies   Alcohol:   Denies   Nicotine:   H/o vaping  Cannabis:   Denies   Hallucinogens/Dissociatives:   Denies   Eating disorder:  Denies   Gambling:              Denies         PAST PSYCHIATRIC HISTORY:  Diagnoses- denies   Suicide Attempts: No   Hospitalizations: No         7/7/2023     1:03 PM 7/20/2023     2:00  PM 8/4/2023     1:00 PM   PHQ   PHQ-9 Total Score 0 0 0   Q9: Thoughts of better off dead/self-harm past 2 weeks Not at all Not at all Not at all     Social History  Housing status: with family father and 8 siblings  Employment status: working at [employer] part-time; plans to start college in the fall studying nursing  Relationship status: Single  Children: no children  Legal: denies       Most recent Recovery [facility] visit 7/20/23.    A/P last visit:  1. Opioid use disorder, severe, dependence (H)  Pt states he has been taking 2mg buprenorphine/day for the last week, cites stress related to work schedule.  Based on , pt's report of remaining films, possible pt is taking 2-4mg/day.  States he wants to continue tapering dose of buprenorphine.    Recommend he continue buprenorphine 2mg/day at least for the next week.  He states in one week he would like to lower dose again.  If he does choose to lower dose, recommend he not decrease below 1.5mg (3/4 film) daily and continue that dose at least 2 weeks before decreasing further.    rx was written to take up to 4mg/day to facilitate pt being able to fill his rx today as it would have been about one week early based on previous rx.   Pt has follow-up already scheduled 8/4/23.    Discussed option for transfer to XR buprenorphine, pt is not interested at this time.  States he would be interested if his efforts to taper off SL buprenorphine are unsuccessful.    Reviewed risk of return to use with discontinuation of therapy, that tapering is not required, and taper is reversible at any time.    - Drugs of Abuse Screen Urine (POC CUPS) POCT  - buprenorphine HCl-naloxone HCl (SUBOXONE) 2-0.5 MG per film; Place 1-2 Film under the tongue daily  Dispense: 20 Film; Refill: 0     2. Nicotine use disorder  Using nicotine gum only     08/04/23 visit:  2 weeks ago started taking 1 film daily, for past week taking about 1/3 film per day - continues to desire tapering dose  Has 5 films  "left  No cravings or substance use   Working a lot, works at [employer]  Mood is \"fine\"  Sleeping well  Appetite is good       Labs discussed with patient?  Yes      [place] Prescription Drug Monitoring Program Reviewed:  Yes; as expected    Medications:  naloxone (NARCAN) 4 MG/0.1ML nasal spray, Spray 1 spray (4 mg) into one nostril alternating nostrils as needed for opioid reversal every 2-3 minutes until assistance arrives (Patient not taking: Reported on 6/14/2023)    No current facility-administered medications on file prior to visit.      No Known Allergies    PMH, PSH, FamHx reviewed      OBJECTIVE                                                      /76 (BP Location: Left arm, Patient Position: Sitting, Cuff Size: Adult Regular)   Pulse 81   Wt 78.5 kg (173 lb)   SpO2 99%   BMI 25.55 kg/m      Physical Exam  Vitals and nursing note reviewed.   Constitutional:       General: He is not in acute distress.     Appearance: Normal appearance. He is not ill-appearing or diaphoretic.   Cardiovascular:      Rate and Rhythm: Normal rate.   Pulmonary:      Effort: Pulmonary effort is normal. No respiratory distress.   Skin:     Coloration: Skin is not jaundiced or pale.   Neurological:      General: No focal deficit present.      Mental Status: He is alert and oriented to person, place, and time.      Gait: Gait normal.   Psychiatric:         Mood and Affect: Mood normal.         Behavior: Behavior normal.         Thought Content: Thought content normal.         Judgment: Judgment normal.         Labs:    UDS:    Lab Results   Component Value Date    BUP Screen Positive (A) 08/04/2023    BZO Negative 08/04/2023    BAR Negative 08/04/2023    RAMON Negative 08/04/2023    MAMP Negative 08/04/2023    AMP Negative 08/04/2023    MDMA Negative 08/04/2023    MTD Negative 08/04/2023    [id number] Negative 08/04/2023    OXY Negative 08/04/2023    PCP Negative 08/04/2023    THC Negative 08/04/2023    TEMP 94 F 08/04/2023    " SGPOCT 1.025 08/04/2023     *POC urine drug screen does not screen for Fentanyl      Recent Results (from the past 240 hour(s))   Drugs of Abuse Screen Urine (POC CUPS) POCT    Collection Time: 08/04/23  1:28 PM   Result Value Ref Range    POCT Kit Lot Number [id number]     POCT Kit Expiration Date 2024-11-20     Temperature Urine POCT 94 F 90 F, 92 F, 94 F, 96 F, 98 F, 100 F    Specific [place] POCT 1.025 1.005, 1.015, 1.025    pH Qual Urine POCT 5 pH 4 pH, 5 pH, 7 pH, 9 pH    Creatinine Qual Urine POCT 100 mg/dL 20 mg/dL, 50 mg/dL, 100 mg/dL, 200 mg/dL    Internal QC Qual Urine POCT Valid Valid    Amphetamine Qual Urine POCT Negative Negative    Barbiturate Qual Urine POCT Negative Negative    Buprenorphine Qual Urine POCT Screen Positive (A) Negative    Benzodiazepine Qual Urine POCT Negative Negative    Cocaine Qual Urine POCT Negative Negative    Methamphetamine Qual Urine POCT Negative Negative    MDMA Qual Urine POCT Negative Negative    Methadone Qual Urine POCT Negative Negative    Opiate Qual Urine POCT Negative Negative    Oxycodone Qual Urine POCT Negative Negative    Phencyclidine Qual Urine POCT Negative Negative    THC Qual Urine POCT Negative Negative         Mena Alcala, [facility]  [address] [place] [address] [phone]

## 2023-08-14 ENCOUNTER — OFFICE VISIT (OUTPATIENT)
Dept: BEHAVIORAL HEALTH | Facility: CLINIC | Age: 23
End: 2023-08-14
Payer: COMMERCIAL

## 2023-08-14 VITALS
TEMPERATURE: 98.9 F | OXYGEN SATURATION: 99 % | SYSTOLIC BLOOD PRESSURE: 113 MMHG | DIASTOLIC BLOOD PRESSURE: 65 MMHG | HEART RATE: 80 BPM

## 2023-08-14 DIAGNOSIS — F11.20 OPIOID USE DISORDER, SEVERE, DEPENDENCE (H): ICD-10-CM

## 2023-08-14 LAB

## 2023-08-14 PROCEDURE — 80307 DRUG TEST PRSMV CHEM ANLYZR: CPT

## 2023-08-14 PROCEDURE — 2894A DRUGS OF ABUSE SCREEN URINE (POC CUPS) POCT: CPT | Performed by: FAMILY MEDICINE

## 2023-08-14 PROCEDURE — 99213 OFFICE O/P EST LOW 20 MIN: CPT | Performed by: FAMILY MEDICINE

## 2023-08-14 RX ORDER — BUPRENORPHINE AND NALOXONE 2; .5 MG/1; MG/1
FILM, SOLUBLE BUCCAL; SUBLINGUAL
Qty: 15 FILM | Refills: 0 | Status: SHIPPED | OUTPATIENT
Start: 2023-08-14 | End: 2023-08-28

## 2023-08-14 ASSESSMENT — PATIENT HEALTH QUESTIONNAIRE - PHQ9: SUM OF ALL RESPONSES TO PHQ QUESTIONS 1-9: 0

## 2023-08-14 NOTE — NURSING NOTE
[facility]      Rooming information:  Approximate last use of full opioid agonist: 12/2021  Taking buprenorphine? Yes:   How much per day? 2-0.5 MG TDD  Number of buprenorphine films/tablets remaining currently: 0  Side effects related to buprenorphine (constipation, dry mouth, sedation?) No   Narcan currently available: Yes  Other recent substance use:    Denies  NICOTINE-Yes: Vape  If using nicotine, ready to quit? Yes: Using Nicorette gum    Point of care urine drug screen positive for:  Lab Results   Component Value Date    BUP Screen Positive (A) 08/14/2023    BZO Negative 08/14/2023    BAR Negative 08/14/2023    RAMON Negative 08/14/2023    MAMP Negative 08/14/2023    AMP Negative 08/14/2023    MDMA Negative 08/14/2023    MTD Negative 08/14/2023    [id number] Negative 08/14/2023    OXY Negative 08/14/2023    PCP Negative 08/14/2023    THC Negative 08/14/2023    TEMP 92 F 08/14/2023    SGPOCT 1.015 08/14/2023       *POC urine drug screen does not screen for Fentanyl        8/14/2023     4:00 PM   PHQ Assesment Total Score(s)   PHQ-9 Score 0       If PHQ-9 score of 15 or higher, has Recovery Clinic therapist or provider been notified? N/A    Any current suicidal ideation? No  If yes, has Recovery Clinic therapist or provider been notified? N/A    Primary care provider: Unsure of provider name, seen at [facility]    Mental health provider: Denies (follow up on MH referral if needed)    Insurance needs: Active    Housing needs: Stable    Contact information up to date? Yes    3rd Party Involvement None today (please obtain ELBA if pt would like to include)    Jessica Belcher RN  August 14, 2023  3:53 PM

## 2023-08-14 NOTE — PROGRESS NOTES
[facility] Recovery [facility] Follow Up    ASSESSMENT/PLAN                                                    1. Opioid use disorder, severe, dependence (H)  Pt continues to desire buprenorphine taper.  Reports he has been taking 2mg/day for some time.   Decrease buprenorphine to 1.5mg/day (3/4 2mg film) daily.  Do not decrease further until next visit.  Reviewed taper is not mandatory and can be reversed at any time.    - Drugs of Abuse Screen Urine (POC CUPS) POCT  - Fentanyl Qualitative with Reflex to Quant Urine; Future  - buprenorphine HCl-naloxone HCl (SUBOXONE) 2-0.5 MG per film; 3/4 film sublingual daily; can increase to 1 film sl daily as needed to address withdrawal symptoms or cravings.  Dispense: 15 Film; Refill: 0      Return in 2 weeks (on 8/28/2023) for Follow up, in person.    Patient counseling completed today:  Discussed mechanism of action, potential risks/benefits/side effects of medications and other recommendations above.      Harm reduction counseling including never use alone, availability of naloxone, risks associated with concurrent use of opioids and benzodiazepines, alcohol, or other sedatives, safer administration as applicable.  Discussed importance of avoiding isolation, building a network of supportive relationships, avoiding people/places/things associated with past use to reduce risk of relapse; including motivational interviewing regarding psychosocial treatment for addiction.   SUBJECTIVE                                                          CC/HPI:  Reji Goldstein is a 23 year old male with PMH nicotine use disorder and opioid use disorder on buprenorphine taper per his request who presents to the Recovery Clinic for return visit.       Brief History:  Reji Goldstein was first seen in Recovery Clinic on 05/15/23. They were referred by staff from [facility] ED after pt was seen there in 3/2023 requesting rx for buprenorphine.   Patient's reasons for seeking treatment on this date  include wanting to continue taper off buprenorphine.     Pt started buprenorphine for treatment of OUD through [facility] in 12/2021.  Max dose 16mg/day.  He began tapering dose on his own in Fall, 2022.  He has continued to decrease dose since that time.  He denies return to use of fentanyl since 12/2021.       Substance Use History :  Opioids:   Age at first use: 19  Current use: substance: Perc 30's ; quantity stated never counted but 10 pills would be the max he would use daily ; route: inhalation  ; timing of last use: 12/2021;                 IV drug use: No   History of overdose: Yes: one episode in a vehicle with friends 7/2020 x2  Previous residential or outpatient treatments for addiction : Yes: [facility] in 2021 in patient   Previous medication treatments for addiction: Yes: Currently is taking Suboxone   Longest period of sobriety: 12/2021 to present  Medical complications related to substance use: denies   Hepatitis C: no record of testing  HIV: 8/14/2019 HIV ag/ab nonreactive     Other Addiction History:  Stimulants   Denies   Sedatives/hypnotics/anxiolytics:   Denies   Alcohol:   Denies   Nicotine:   H/o vaping  Cannabis:   Denies   Hallucinogens/Dissociatives:   Denies   Eating disorder:  Denies   Gambling:              Denies         PAST PSYCHIATRIC HISTORY:  Diagnoses- denies   Suicide Attempts: No   Hospitalizations: No         7/20/2023     2:00 PM 8/4/2023     1:00 PM 8/14/2023     4:00 PM   PHQ   PHQ-9 Total Score 0 0 0   Q9: Thoughts of better off dead/self-harm past 2 weeks Not at all Not at all Not at all     Social History  Housing status: with family father and 8 siblings  Employment status: working at [employer] part-time; plans to start college in the fall studying nursing  Relationship status: Single  Children: no children  Legal: denies       Most recent Recovery Clinic visit 8/4/23    A/P last visit:  1. Opioid use disorder, severe, dependence (H)  Stable.  Desires continued taper.  Has been  taking 1/3 film daily.  Plan to alternate between 1/3 and 1/4 of a film daily for 1-2 weeks and then drop to 1/4 film daily.  Reviewed option of Sublocade for taper, he will consider if unable to complete taper with films.  Reviewed OK to increase dose back to full film if increase of cravings or withdrawal.  Has Narcan.    - Drugs of Abuse Screen Urine (POC CUPS) POCT  - buprenorphine HCl-naloxone HCl (SUBOXONE) 2-0.5 MG per film; Place 1 Film under the tongue daily  Dispense: 10 Film; Refill: 0     2. Nicotine use disorder  Encouraged continued efforts.  - nicotine polacrilex (NICORETTE) 4 MG gum; Place 1 each (4 mg) inside cheek every hour as needed for smoking cessation  Dispense: 240 each; Refill: 3     3. Therapeutic opioid-induced constipation (OIC)  Stable, continue Senna as needed.    - SENNA-docusate sodium (SENNA S) 8.6-50 MG tablet; Take 1 tablet by mouth daily as needed (constipation)  Dispense: 30 tablet; Refill: 1                Return in about 2 weeks (around 8/18/2023) for Follow up, in person.      08/14/23 visit:  Pt states he has been taking one 2mg/0.5mg film daily since last visit.  He goes on to say he has been taking this dose for the last 2 months.  He wants to taper off buprenorphine.  Denies cravings or return to use.    Work has been less stressful, they have hired more people.  No longer working overtime.       Labs discussed with patient?  Yes      [place] Prescription Drug Monitoring Program Reviewed:  Yes; as expected  08/04/2023 08/04/2023 2 Suboxone 2 Mg-0.5 Mg Sl Film 10.00 10 Ka Par [id number] Romel (9251) 0/0 2.00 mg Medicaid [place]     Medications:  buprenorphine HCl-naloxone HCl (SUBOXONE) 2-0.5 MG per film, Place 1 Film under the tongue daily  nicotine polacrilex (NICORETTE) 4 MG gum, Place 1 each (4 mg) inside cheek every hour as needed for smoking cessation  SENNA-docusate sodium (SENNA S) 8.6-50 MG tablet, Take 1 tablet by mouth daily as needed (constipation)  naloxone (NARCAN) 4  MG/0.1ML nasal spray, Spray 1 spray (4 mg) into one nostril alternating nostrils as needed for opioid reversal every 2-3 minutes until assistance arrives (Patient not taking: Reported on 6/14/2023)    No current facility-administered medications on file prior to visit.      No Known Allergies    PMH, PSH, FamHx reviewed      OBJECTIVE                                                      /65 (BP Location: Left arm, Patient Position: Sitting)   Pulse 80   Temp 98.9  F (37.2  C) (Oral)   SpO2 99%     Physical Exam  Vitals and nursing note reviewed.   Constitutional:       General: He is not in acute distress.     Appearance: Normal appearance. He is not ill-appearing or diaphoretic.   Cardiovascular:      Rate and Rhythm: Normal rate.   Pulmonary:      Effort: Pulmonary effort is normal. No respiratory distress.   Skin:     Coloration: Skin is not jaundiced or pale.   Neurological:      General: No focal deficit present.      Mental Status: He is alert and oriented to person, place, and time.      Gait: Gait normal.   Psychiatric:         Mood and Affect: Mood normal.         Behavior: Behavior normal.         Thought Content: Thought content normal.         Judgment: Judgment normal.         Labs:    UDS:    Lab Results   Component Value Date    BUP Screen Positive (A) 08/14/2023    BZO Negative 08/14/2023    BAR Negative 08/14/2023    RAMON Negative 08/14/2023    MAMP Negative 08/14/2023    AMP Negative 08/14/2023    MDMA Negative 08/14/2023    MTD Negative 08/14/2023    HBS589 Negative 08/14/2023    OXY Negative 08/14/2023    PCP Negative 08/14/2023    THC Negative 08/14/2023    TEMP 92 F 08/14/2023    SGPOCT 1.015 08/14/2023     *POC urine drug screen does not screen for Fentanyl      Recent Results (from the past 240 hour(s))   Drugs of Abuse Screen Urine (POC CUPS) POCT    Collection Time: 08/14/23  4:08 PM   Result Value Ref Range    POCT Kit Lot Number [id number]     POCT Kit Expiration Date 11/20/2024      Temperature Urine POCT 92 F 90 F, 92 F, 94 F, 96 F, 98 F, 100 F    Specific [place] POCT 1.015 1.005, 1.015, 1.025    pH Qual Urine POCT 5 pH 4 pH, 5 pH, 7 pH, 9 pH    Creatinine Qual Urine POCT 20 mg/dL 20 mg/dL, 50 mg/dL, 100 mg/dL, 200 mg/dL    Internal QC Qual Urine POCT Valid Valid    Amphetamine Qual Urine POCT Negative Negative    Barbiturate Qual Urine POCT Negative Negative    Buprenorphine Qual Urine POCT Screen Positive (A) Negative    Benzodiazepine Qual Urine POCT Negative Negative    Cocaine Qual Urine POCT Negative Negative    Methamphetamine Qual Urine POCT Negative Negative    MDMA Qual Urine POCT Negative Negative    Methadone Qual Urine POCT Negative Negative    Opiate Qual Urine POCT Negative Negative    Oxycodone Qual Urine POCT Negative Negative    Phencyclidine Qual Urine POCT Negative Negative    THC Qual Urine POCT Negative Negative       Erica Padron MD  Addiction Medicine  [facility]  [address] [place] [id number] [phone]

## 2023-08-22 ENCOUNTER — TELEPHONE (OUTPATIENT)
Dept: BEHAVIORAL HEALTH | Facility: CLINIC | Age: 23
End: 2023-08-22
Payer: COMMERCIAL

## 2023-08-26 ENCOUNTER — TELEPHONE (OUTPATIENT)
Dept: NURSING | Facility: CLINIC | Age: 23
End: 2023-08-26

## 2023-08-26 ENCOUNTER — HOSPITAL ENCOUNTER (EMERGENCY)
Facility: CLINIC | Age: 23
Discharge: HOME | End: 2023-08-26
Attending: EMERGENCY MEDICINE | Admitting: EMERGENCY MEDICINE
Payer: COMMERCIAL

## 2023-08-26 VITALS
HEIGHT: 69 IN | DIASTOLIC BLOOD PRESSURE: 74 MMHG | BODY MASS INDEX: 22.22 KG/M2 | SYSTOLIC BLOOD PRESSURE: 117 MMHG | OXYGEN SATURATION: 98 % | HEART RATE: 79 BPM | RESPIRATION RATE: 16 BRPM | TEMPERATURE: 98.2 F | WEIGHT: 150 LBS

## 2023-08-26 DIAGNOSIS — Z76.0 ENCOUNTER FOR MEDICATION REFILL: ICD-10-CM

## 2023-08-26 DIAGNOSIS — F11.90 OPIOID USE DISORDER: ICD-10-CM

## 2023-08-26 PROCEDURE — 99283 EMERGENCY DEPT VISIT LOW MDM: CPT | Performed by: EMERGENCY MEDICINE

## 2023-08-26 PROCEDURE — 250N000013 HC RX MED GY IP 250 OP 250 PS 637: Performed by: EMERGENCY MEDICINE

## 2023-08-26 PROCEDURE — G2213 INITIAT MED ASSIST TX IN ER: HCPCS | Performed by: EMERGENCY MEDICINE

## 2023-08-26 RX ORDER — BUPRENORPHINE AND NALOXONE 8; 2 MG/1; MG/1
FILM, SOLUBLE BUCCAL; SUBLINGUAL
Qty: 9 FILM | Refills: 0 | Status: SHIPPED | OUTPATIENT
Start: 2023-08-26 | End: 2023-09-27

## 2023-08-26 RX ORDER — BUPRENORPHINE HYDROCHLORIDE AND NALOXONE HYDROCHLORIDE DIHYDRATE 2; .5 MG/1; MG/1
1 TABLET SUBLINGUAL ONCE
Status: COMPLETED | OUTPATIENT
Start: 2023-08-26 | End: 2023-08-26

## 2023-08-26 RX ADMIN — BUPRENORPHINE AND NALOXONE 1 TABLET: 2; .5 TABLET SUBLINGUAL at 16:44

## 2023-08-26 NOTE — ED TRIAGE NOTES
Patient lost 2 films today, patient has a refill for monday 28th. Patient needs doses for the weekend.      Triage Assessment       Row Name 08/26/23 4533       Triage Assessment (Adult)    Airway WDL WDL       Respiratory WDL    Respiratory WDL WDL       Skin Circulation/Temperature WDL    Skin Circulation/Temperature WDL WDL       Cardiac WDL    Cardiac WDL WDL       Peripheral/Neurovascular WDL    Peripheral Neurovascular WDL WDL       Cognitive/Neuro/Behavioral WDL    Cognitive/Neuro/Behavioral WDL WDL

## 2023-08-26 NOTE — TELEPHONE ENCOUNTER
Patient calling for refill of suboxone. Patient states he has an appointment on 8/28 but was looking to get a refill sooner. Informed that medication needs to be addressed by his provider and is managed during clinic hours. Routing to provider for review.   Chloé Hill RN   08/26/23 1:08 PM  [facility] Nurse Advisor

## 2023-08-26 NOTE — DISCHARGE INSTRUCTIONS
We only have access to suboxone 8--2 mg films.  You will need to rip these films into 4 pieces for you to get your appropriate dosing.  Please follow up with the Recovery clinic this week as already scheduled.

## 2023-08-26 NOTE — Clinical Note
Reji Goldstein was seen and treated in our emergency department on 8/26/2023.  He may return to work on 08/26/2023.  Please allow Reji to come to work late today..  He was at a medical appointment prior to coming to work.      If you have any questions or concerns, please don't hesitate to call.      Faina Garcia MD

## 2023-08-26 NOTE — ED PROVIDER NOTES
[facility] EMERGENCY DEPARTMENT ([facility])    8/26/23      ED PROVIDER NOTE   Hallway I  History     Chief Complaint   Patient presents with    Medication Refill     Here for suboxone refill      The history is provided by the patient and medical records.     Reji Goldstein is a 23 year old male with a past medical history of severe opiate use disorder who presents to the emergency department seeking a refill on Suboxone.  He had lost 2 films today.  He has outpatient follow-up with Dr. Alcala of addiction medicine 2 days from now and needs doses to last him through the weekend.  His last dose was yesterday.      I have reviewed the Medications, Allergies, Past Medical and Surgical History, and Social History in the [employer] system.    Past Medical History:   Diagnosis Date    Substance abuse (H)      History reviewed. No pertinent surgical history.  Current Facility-Administered Medications   Medication    buprenorphine-naloxone (SUBOXONE) 2-0.5 MG sublingual tablet 1 tablet     Current Outpatient Medications   Medication    buprenorphine HCl-naloxone HCl (SUBOXONE) 2-0.5 MG per film    buprenorphine HCl-naloxone HCl (SUBOXONE) 8-2 MG per film    naloxone (NARCAN) 4 MG/0.1ML nasal spray    nicotine polacrilex (NICORETTE) 4 MG gum    SENNA-docusate sodium (SENNA S) 8.6-50 MG tablet     No Known Allergies  Past medical history, past surgical history, medications, and allergies were reviewed with the patient. Additional pertinent items: None    Social History     Socioeconomic History    Marital status: Single     Spouse name: Not on file    Number of children: Not on file    Years of education: Not on file    Highest education level: Not on file   Occupational History    Not on file   Tobacco Use    Smoking status: Former     Types: Vaping Device     Passive exposure: Never    Smokeless tobacco: Not on file    Tobacco comments:     vape   Substance and Sexual Activity    Alcohol use: Not Currently    Drug use:  "Not Currently    Sexual activity: Not Currently   Other Topics Concern    Not on file   Social History Narrative    Not on file     Social Determinants of Health     Financial Resource Strain: Not on file   Food Insecurity: Not on file   Transportation Needs: Not on file   Physical Activity: Not on file   Stress: Not on file   Social Connections: Not on file   Intimate Partner Violence: Not on file   Housing Stability: Not on file     Social history was reviewed with the patient. Additional pertinent items: None    Review of Systems  A medically appropriate review of systems was performed with pertinent positives and negatives noted in the HPI, and all other systems negative.    Physical Exam   BP: 117/74  Pulse: 79  Temp: 98.2  F (36.8  C)  Resp: 16  Height: 175.3 cm (5' 9\")  Weight: 68 kg (150 lb)  SpO2: 98 %      General: Well nourished, well developed, NAD  HEENT: EOMI, anicteric. NCAT, MMM  Neck: supple, nl ROM  Lungs: normal rate of breating.   Cardiac: Regular rate and rhythm.     Skin: Warm and dry to the touch.  No rash  Extremities: No LE edema, no cyanosis, w/w/p  Neuro: A&Ox3, no gross focal deficits  Psychiatry: calm and cooperative.      ED Course        Procedures                Labs Ordered and Resulted from Time of ED Arrival to Time of ED Departure - No data to display         No results found for this or any previous visit (from the past 24 hour(s)).    Labs, vital signs, and imaging studies were reviewed by me.    Medications   buprenorphine-naloxone (SUBOXONE) 2-0.5 MG sublingual tablet 1 tablet (has no administration in time range)       Assessments & Plan (with Medical Decision Making)   Reji Goldstein is a 23 year old male who presents to the ED seeking a refill of suboxone 2-0.5mg films. He says he is followed by the recovery clinic and lost 2 films. Last dose was yesterday and next follow up appointment for refills is on the 28th of August.  He was given a dose here.  Our pharmacy is closed " so we used the [employer] machine for refill.  He will have to cut the film into 4 pieces which I discussed with our pharmacist. He understands the directions.       Critical care was not performed.     Medical Decision Making  The patient's presentation was of low complexity (a stable chronic illness).    The patient's evaluation involved:  3 prior recovery clinic notes from dr. Griggs and john paul.      The patient's management necessitated high risk (a parenteral controlled substance).      I have reviewed the nursing notes.    I have reviewed the findings, diagnosis, plan and need for follow up with the patient.          New Prescriptions    BUPRENORPHINE HCL-NALOXONE HCL (SUBOXONE) 8-2 MG PER FILM    Take 1/4 strip daily.       Final diagnoses:   Opioid use disorder   Encounter for medication refill       GUANAKO JAIMES MD  8/26/2023   [facility] EMERGENCY DEPARTMENT       Faina Garcia MD  08/26/23 0146

## 2023-08-28 ENCOUNTER — OFFICE VISIT (OUTPATIENT)
Dept: BEHAVIORAL HEALTH | Facility: CLINIC | Age: 23
End: 2023-08-28
Payer: COMMERCIAL

## 2023-08-28 VITALS — HEART RATE: 94 BPM | DIASTOLIC BLOOD PRESSURE: 78 MMHG | SYSTOLIC BLOOD PRESSURE: 126 MMHG

## 2023-08-28 DIAGNOSIS — F11.20 OPIOID USE DISORDER, SEVERE, DEPENDENCE (H): Primary | ICD-10-CM

## 2023-08-28 DIAGNOSIS — F17.200 NICOTINE USE DISORDER: ICD-10-CM

## 2023-08-28 LAB
AMPHETAMINE QUAL URINE POCT: NEGATIVE
BARBITURATE QUAL URINE POCT: NEGATIVE
BENZODIAZEPINE QUAL URINE POCT: NEGATIVE
BUPRENORPHINE QUAL URINE POCT: ABNORMAL
COCAINE QUAL URINE POCT: NEGATIVE
CREATININE QUAL URINE POCT: ABNORMAL
INTERNAL QC QUAL URINE POCT: ABNORMAL
MDMA QUAL URINE POCT: NEGATIVE
METHADONE QUAL URINE POCT: NEGATIVE
METHAMPHETAMINE QUAL URINE POCT: NEGATIVE
OPIATE QUAL URINE POCT: NEGATIVE
OXYCODONE QUAL URINE POCT: NEGATIVE
PH QUAL URINE POCT: ABNORMAL
PHENCYCLIDINE QUAL URINE POCT: NEGATIVE
POCT KIT EXPIRATION DATE: ABNORMAL
POCT KIT LOT NUMBER: ABNORMAL
SPECIFIC GRAVITY POCT: 1.01
TEMPERATURE URINE POCT: ABNORMAL
THC QUAL URINE POCT: NEGATIVE

## 2023-08-28 PROCEDURE — 80305 DRUG TEST PRSMV DIR OPT OBS: CPT | Performed by: FAMILY MEDICINE

## 2023-08-28 PROCEDURE — 99214 OFFICE O/P EST MOD 30 MIN: CPT | Performed by: FAMILY MEDICINE

## 2023-08-28 RX ORDER — BUPRENORPHINE AND NALOXONE 2; .5 MG/1; MG/1
FILM, SOLUBLE BUCCAL; SUBLINGUAL
Qty: 15 FILM | Refills: 0 | Status: SHIPPED | OUTPATIENT
Start: 2023-08-28 | End: 2023-09-11

## 2023-08-28 ASSESSMENT — PATIENT HEALTH QUESTIONNAIRE - PHQ9: SUM OF ALL RESPONSES TO PHQ QUESTIONS 1-9: 0

## 2023-08-28 NOTE — PROGRESS NOTES
[facility] Follow Up    ASSESSMENT/PLAN                                                      1. Opioid use disorder, severe, dependence (H)  Stable.  Plan to continue with Suboxone taper.  He reports taking close to 2 mg daily.  Plan to decrease to alternating dose of 2 mg and 1 mg every other day for 1 week and then 1 mg daily.  Advised him at any time he can resume 2 mg daily dose.  Reviewed that Sublocade remains an option for helping taper off buprenorphine.  Has Narcan.  Recent fentanyl test 2 weeks ago was negative.  We will send buprenorphine confirmatory testing today.  - Drugs of Abuse Screen Urine (POC CUPS) POCT  - buprenorphine HCl-naloxone HCl (SUBOXONE) 2-0.5 MG per film; Alternate taking 1 film daily and 1/2 film daily x 1 week and then take 1/2 film daily x 1 week.  Dispense: 15 Film; Refill: 0  - Buprenorphine & Metabolite Screen; Future  - Buprenorphine & Metabolite Screen    2. Nicotine use disorder  Encouraged continued efforts in nicotine cessation.  Has NRT.       Return in about 2 weeks (around 9/11/2023) for Follow up, in person.    Patient counseling completed today:  Discussed mechanism of action, potential risks/benefits/side effects of medications and other recommendations above.    Reviewed directions for initiation of buprenorphine to reduce risk of precipitated withdrawal and maximize efficacy.    Harm reduction counseling including never use alone, availability of naloxone, risks associated with concurrent use of opioids and benzodiazepines, alcohol, or other sedatives, safer administration as applicable.  Discussed importance of avoiding isolation, building a network of supportive relationships, avoiding people/places/things associated with past use to reduce risk of relapse; including motivational interviewing regarding psychosocial interventions.   SUBJECTIVE                                                          CC/HPI:  Rejiryan Goldstein is a 23 year old  male with PMH nicotine use disorder and opioid use disorder on buprenorphine taper per his request who presents to the Recovery [facility] for return visit.       Brief History:  Reji Goldstein was first seen in Recovery Clinic on 05/15/23. They were referred by staff from [facility] ED after pt was seen there in 3/2023 requesting rx for buprenorphine.   Patient's reasons for seeking treatment on this date include wanting to continue taper off buprenorphine.     Pt started buprenorphine for treatment of OUD through [facility] in 12/2021.  Max dose 16mg/day.  He began tapering dose on his own in Fall, 2022.  He has continued to decrease dose since that time.  He denies return to use of fentanyl since 12/2021.       Substance Use History :  Opioids:   Age at first use: 19  Current use: substance: Perc 30's ; quantity stated never counted but 10 pills would be the max he would use daily ; route: inhalation  ; timing of last use: 12/2021;                 IV drug use: No   History of overdose: Yes: one episode in a vehicle with friends 7/2020 x2  Previous residential or outpatient treatments for addiction : Yes: kerrie in 2021 in patient   Previous medication treatments for addiction: Yes: Currently is taking Suboxone   Longest period of sobriety: 12/2021 to present  Medical complications related to substance use: denies   Hepatitis C: no record of testing  HIV: 8/14/2019 HIV ag/ab nonreactive     Other Addiction History:  Stimulants   Denies   Sedatives/hypnotics/anxiolytics:   Denies   Alcohol:   Denies   Nicotine:   H/o vaping  Cannabis:   Denies   Hallucinogens/Dissociatives:   Denies   Eating disorder:  Denies   Gambling:              Denies         PAST PSYCHIATRIC HISTORY:  Diagnoses- denies   Suicide Attempts: No   Hospitalizations: No       8/4/2023     1:00 PM 8/14/2023     4:00 PM 8/28/2023     2:00 PM   PHQ   PHQ-9 Total Score 0 0 0   Q9: Thoughts of better off dead/self-harm past 2 weeks Not at all Not at all Not at all        Social History  Housing status: with family father and 8 siblings  Employment status: working at [employer] part-time; plans to start college in the fall studying nursing  Relationship status: Single  Children: no children  Legal: denies       Most recent [facility] visit: 8/4/23    A/P last visit:  1. Opioid use disorder, severe, dependence (H)  Stable.  Desires continued taper.  Has been taking 1/3 film daily.  Plan to alternate between 1/3 and 1/4 of a film daily for 1-2 weeks and then drop to 1/4 film daily.  Reviewed option of Sublocade for taper, he will consider if unable to complete taper with films.  Reviewed OK to increase dose back to full film if increase of cravings or withdrawal.  Has Narcan.    - Drugs of Abuse Screen Urine (POC CUPS) POCT  - buprenorphine HCl-naloxone HCl (SUBOXONE) 2-0.5 MG per film; Place 1 Film under the tongue daily  Dispense: 10 Film; Refill: 0     2. Nicotine use disorder  Encouraged continued efforts.  - nicotine polacrilex (NICORETTE) 4 MG gum; Place 1 each (4 mg) inside cheek every hour as needed for smoking cessation  Dispense: 240 each; Refill: 3     3. Therapeutic opioid-induced constipation (OIC)  Stable, continue Senna as needed.    - SENNA-docusate sodium (SENNA S) 8.6-50 MG tablet; Take 1 tablet by mouth daily as needed (constipation)  Dispense: 30 tablet; Refill: 1    08/28/23 visit:  Has been taking a little less than 2mg daily since ER - went because he had lost 2 films, received script for 8-2mg films so taking <1/4 film  No cravings or withdrawal  Wants to continue with Suboxone taper  Nicotine use decreasing - buying significantly less frequently (notes spending significantly less money)      Labs discussed with patient?  Yes      [place] Prescription Drug Monitoring Program Reviewed:  Yes; as expected    Medications:  buprenorphine HCl-naloxone HCl (SUBOXONE) 8-2 MG per film, Take 1/4 strip daily.  naloxone (NARCAN) 4 MG/0.1ML nasal spray, Spray 1  spray (4 mg) into one nostril alternating nostrils as needed for opioid reversal every 2-3 minutes until assistance arrives (Patient not taking: Reported on 6/14/2023)  nicotine polacrilex (NICORETTE) 4 MG gum, Place 1 each (4 mg) inside cheek every hour as needed for smoking cessation  SENNA-docusate sodium (SENNA S) 8.6-50 MG tablet, Take 1 tablet by mouth daily as needed (constipation)    No current facility-administered medications on file prior to visit.      No Known Allergies    PMH, PSH, FamHx reviewed      OBJECTIVE                                                      /78   Pulse 94     Physical Exam  Vitals and nursing note reviewed.   Constitutional:       General: He is not in acute distress.     Appearance: Normal appearance. He is not ill-appearing or diaphoretic.   HENT:      Head: Normocephalic and atraumatic.      Mouth/Throat:      Mouth: Mucous membranes are moist.   Eyes:      General: No scleral icterus.  Cardiovascular:      Rate and Rhythm: Normal rate.   Pulmonary:      Effort: Pulmonary effort is normal. No respiratory distress.   Skin:     Coloration: Skin is not jaundiced or pale.   Neurological:      General: No focal deficit present.      Mental Status: He is alert and oriented to person, place, and time.   Psychiatric:         Mood and Affect: Mood normal.         Behavior: Behavior normal.         Thought Content: Thought content normal.         Judgment: Judgment normal.         Labs:    UDS:    Lab Results   Component Value Date    BUP Screen Positive (A) 08/28/2023    BZO Negative 08/28/2023    BAR Negative 08/28/2023    RAMON Negative 08/28/2023    MAMP Negative 08/28/2023    AMP Negative 08/28/2023    MDMA Negative 08/28/2023    MTD Negative 08/28/2023    [id number] Negative 08/28/2023    OXY Negative 08/28/2023    PCP Negative 08/28/2023    THC Negative 08/28/2023    TEMP 92 F 08/28/2023    SGPOCT 1.015 08/28/2023     *POC urine drug screen does not screen for  Fentanyl      Recent Results (from the past 240 hour(s))   Drugs of Abuse Screen Urine (POC CUPS) POCT    Collection Time: 08/28/23  3:22 PM   Result Value Ref Range    POCT Kit Lot Number [id number]     POCT Kit Expiration Date 2024-11-20     Temperature Urine POCT 92 F 90 F, 92 F, 94 F, 96 F, 98 F, 100 F    Specific [place] POCT 1.015 1.005, 1.015, 1.025    pH Qual Urine POCT 5 pH 4 pH, 5 pH, 7 pH, 9 pH    Creatinine Qual Urine POCT 50 mg/dL 20 mg/dL, 50 mg/dL, 100 mg/dL, 200 mg/dL    Internal QC Qual Urine POCT Valid Valid    Amphetamine Qual Urine POCT Negative Negative    Barbiturate Qual Urine POCT Negative Negative    Buprenorphine Qual Urine POCT Screen Positive (A) Negative    Benzodiazepine Qual Urine POCT Negative Negative    Cocaine Qual Urine POCT Negative Negative    Methamphetamine Qual Urine POCT Negative Negative    MDMA Qual Urine POCT Negative Negative    Methadone Qual Urine POCT Negative Negative    Opiate Qual Urine POCT Negative Negative    Oxycodone Qual Urine POCT Negative Negative    Phencyclidine Qual Urine POCT Negative Negative    THC Qual Urine POCT Negative Negative         Mena Alcala DO  [facility]  [address] [place] [id number] [phone]

## 2023-08-28 NOTE — PATIENT INSTRUCTIONS
Alternate taking 1 film and 1/2 film daily (so 1 film today, 1/2 tomorrow, 1 film Wednesday, 1/2 film Thursday, etc) x 1 week and then take 1/2 film daily.    Follow-up in 2 weeks.

## 2023-08-28 NOTE — NURSING NOTE
[facility]  Was in ED 8/26/23 received a few suboxone,states didn't take suboxone yesterday and may have missed a few doses    Rooming information:  Approximate last use of full opioid agonist: 2021  Taking buprenorphine? Yes:   How much per day? 2mg  Number of buprenorphine films/tablets remaining currently: 0  Side effects related to buprenorphine (constipation, dry mouth, sedation?) No   Narcan currently available: Yes  Other recent substance use:    Denies  NICOTINE-Yes:   If using nicotine, ready to quit? Yes: trying    Point of care urine drug screen positive for:  Lab Results   Component Value Date    BUP Screen Positive (A) 08/28/2023    BZO Negative 08/28/2023    BAR Negative 08/28/2023    RAMON Negative 08/28/2023    MAMP Negative 08/28/2023    AMP Negative 08/28/2023    MDMA Negative 08/28/2023    MTD Negative 08/28/2023    [id number] Negative 08/28/2023    OXY Negative 08/28/2023    PCP Negative 08/28/2023    THC Negative 08/28/2023    TEMP 92 F 08/28/2023    SGPOCT 1.015 08/28/2023       *POC urine drug screen does not screen for Fentanyl          8/28/2023     2:00 PM   PHQ Assesment Total Score(s)   PHQ-9 Score 0       If PHQ-9 score of 15 or higher, has Recovery Clinic therapist or provider been notified? No    Any current suicidal ideation? No  If yes, has Recovery Clinic therapist or provider been notified? N/A    Primary care provider: Physician No Ref-Primary     Mental health provider: denies (follow up on MH referral if needed)    Insurance needs: active    Housing needs: stable    Contact information up to date? yes    3rd Party Involvement not today (please obtain ELBA if pt would like to include)    Jyoti Mcdonough MA  August 28, 2023  2:51 PM

## 2023-08-31 LAB
BUPRENORPHINE UR-MCNC: 102 NG/ML
BUPRENORPHINE UR-MCNC: 7 NG/ML
NALOXONE UR CFM-MCNC: <100 NG/ML
NORBUPRENORPHINE UR CFM-MCNC: 277 NG/ML
NORBUPRENORPHINE UR-MCNC: 77 NG/ML

## 2023-09-11 ENCOUNTER — OFFICE VISIT (OUTPATIENT)
Dept: BEHAVIORAL HEALTH | Facility: CLINIC | Age: 23
End: 2023-09-11
Payer: COMMERCIAL

## 2023-09-11 ENCOUNTER — TELEPHONE (OUTPATIENT)
Dept: BEHAVIORAL HEALTH | Facility: CLINIC | Age: 23
End: 2023-09-11

## 2023-09-11 VITALS — HEART RATE: 74 BPM | SYSTOLIC BLOOD PRESSURE: 122 MMHG | DIASTOLIC BLOOD PRESSURE: 77 MMHG

## 2023-09-11 DIAGNOSIS — F11.20 OPIOID USE DISORDER, SEVERE, DEPENDENCE (H): Primary | ICD-10-CM

## 2023-09-11 DIAGNOSIS — F17.200 NICOTINE USE DISORDER: ICD-10-CM

## 2023-09-11 PROCEDURE — 80305 DRUG TEST PRSMV DIR OPT OBS: CPT | Performed by: FAMILY MEDICINE

## 2023-09-11 PROCEDURE — 99214 OFFICE O/P EST MOD 30 MIN: CPT | Performed by: FAMILY MEDICINE

## 2023-09-11 RX ORDER — BUPRENORPHINE AND NALOXONE 2; .5 MG/1; MG/1
1 FILM, SOLUBLE BUCCAL; SUBLINGUAL DAILY
Qty: 15 FILM | Refills: 0 | Status: SHIPPED | OUTPATIENT
Start: 2023-09-11 | End: 2023-09-26

## 2023-09-11 ASSESSMENT — PATIENT HEALTH QUESTIONNAIRE - PHQ9: SUM OF ALL RESPONSES TO PHQ QUESTIONS 1-9: 0

## 2023-09-11 NOTE — TELEPHONE ENCOUNTER
PRS placed a telephone recovery support call to pt given a recent no show for scheduled appointment with provider in Recovery Clinic.  Pt wasn't available.

## 2023-09-11 NOTE — PROGRESS NOTES
[facility] Follow Up    ASSESSMENT/PLAN                                                      1. Opioid use disorder, severe, dependence (H)  Stable.  Continue working on self taper.  Currently taking 2/3-3/4 film daily and will continue with this.  Reviewed Sublocade as alternative for final taper, feels comfortable with his current progress.   - Drugs of Abuse Screen Urine (POC CUPS) POCT  - buprenorphine HCl-naloxone HCl (SUBOXONE) 2-0.5 MG per film; Place 1 Film under the tongue daily  Dispense: 15 Film; Refill: 0    2. Nicotine use disorder  Encouraged continued cessation efforts.  Reviewed any decrease in use provides him health benefits.  He is also motivated by financial benefit of not using nicotine.       Return in about 2 weeks (around 9/25/2023).    Patient counseling completed today:  Discussed mechanism of action, potential risks/benefits/side effects of medications and other recommendations above.    Reviewed directions for initiation of buprenorphine to reduce risk of precipitated withdrawal and maximize efficacy.    Harm reduction counseling including never use alone, availability of naloxone, risks associated with concurrent use of opioids and benzodiazepines, alcohol, or other sedatives, safer administration as applicable.  Discussed importance of avoiding isolation, building a network of supportive relationships, avoiding people/places/things associated with past use to reduce risk of relapse; including motivational interviewing regarding psychosocial interventions.   SUBJECTIVE                                                          CC/HPI:  Reji Goldstein is a 23 year old male with PMH nicotine use disorder and opioid use disorder on buprenorphine taper per his request who presents to the Recovery Clinic for return visit.       Brief History:  Reji Goldstein was first seen in Recovery Clinic on 05/15/23. They were referred by staff from [facility] ED after pt was seen there in  3/2023 requesting rx for buprenorphine.   Patient's reasons for seeking treatment on this date include wanting to continue taper off buprenorphine.     Pt started buprenorphine for treatment of OUD through [facility] in 12/2021.  Max dose 16mg/day.  He began tapering dose on his own in Fall, 2022.  He has continued to decrease dose since that time.  He denies return to use of fentanyl since 12/2021.       Substance Use History :  Opioids:   Age at first use: 19  Current use: substance: Perc 30's ; quantity stated never counted but 10 pills would be the max he would use daily ; route: inhalation  ; timing of last use: 12/2021;                 IV drug use: No   History of overdose: Yes: one episode in a vehicle with friends 7/2020 x2  Previous residential or outpatient treatments for addiction : Yes: kerrie in 2021 in patient   Previous medication treatments for addiction: Yes: Currently is taking Suboxone   Longest period of sobriety: 12/2021 to present  Medical complications related to substance use: denies   Hepatitis C: no record of testing  HIV: 8/14/2019 HIV ag/ab nonreactive     Other Addiction History:  Stimulants   Denies   Sedatives/hypnotics/anxiolytics:   Denies   Alcohol:   Denies   Nicotine:   H/o vaping  Cannabis:   Denies   Hallucinogens/Dissociatives:   Denies   Eating disorder:  Denies   Gambling:              Denies         PAST PSYCHIATRIC HISTORY:  Diagnoses- denies   Suicide Attempts: No   Hospitalizations: No         8/14/2023     4:00 PM 8/28/2023     2:00 PM 9/11/2023     2:00 PM   PHQ   PHQ-9 Total Score 0 0 0   Q9: Thoughts of better off dead/self-harm past 2 weeks Not at all Not at all Not at all       Social History  Housing status: with family father and 8 siblings  Employment status: working at [employer] part-time  Relationship status: Single  Children: no children  Legal: denies       Most recent Recovery Clinic visit: 8/28/23    A/P last visit:  1. Opioid use disorder, severe, dependence  (H)  Stable.  Plan to continue with Suboxone taper.  He reports taking close to 2 mg daily.  Plan to decrease to alternating dose of 2 mg and 1 mg every other day for 1 week and then 1 mg daily.  Advised him at any time he can resume 2 mg daily dose.  Reviewed that Sublocade remains an option for helping taper off buprenorphine.  Has Narcan.  Recent fentanyl test 2 weeks ago was negative.  We will send buprenorphine confirmatory testing today.  - Drugs of Abuse Screen Urine (POC CUPS) POCT  - buprenorphine HCl-naloxone HCl (SUBOXONE) 2-0.5 MG per film; Alternate taking 1 film daily and 1/2 film daily x 1 week and then take 1/2 film daily x 1 week.  Dispense: 15 Film; Refill: 0  - Buprenorphine & Metabolite Screen; Future  - Buprenorphine & Metabolite Screen     2. Nicotine use disorder  Encouraged continued efforts in nicotine cessation.  Has NRT.    09/11/23 visit:  Taking about 2/3-3/4 film per day (1/2 was too low - had a bit of withdrawal)  No constipation or other side effects  No opioid cravings  Continues to decrease nicotine use   No change in appetite, sleeping well (last night got off work late so didn't get much sleep)  No other health concerns    Labs discussed with patient?  Yes      [place] Prescription Drug Monitoring Program Reviewed:  Yes; as expected    Medications:  buprenorphine HCl-naloxone HCl (SUBOXONE) 8-2 MG per film, Take 1/4 strip daily.  naloxone (NARCAN) 4 MG/0.1ML nasal spray, Spray 1 spray (4 mg) into one nostril alternating nostrils as needed for opioid reversal every 2-3 minutes until assistance arrives (Patient not taking: Reported on 6/14/2023)  nicotine polacrilex (NICORETTE) 4 MG gum, Place 1 each (4 mg) inside cheek every hour as needed for smoking cessation  SENNA-docusate sodium (SENNA S) 8.6-50 MG tablet, Take 1 tablet by mouth daily as needed (constipation)    No current facility-administered medications on file prior to visit.      No Known Allergies    PMH, PSH, FamHx  reviewed      OBJECTIVE                                                      /77   Pulse 74     Physical Exam  Vitals and nursing note reviewed.   Constitutional:       General: He is not in acute distress.     Appearance: Normal appearance. He is not ill-appearing or diaphoretic.   HENT:      Head: Normocephalic and atraumatic.      Mouth/Throat:      Mouth: Mucous membranes are moist.   Eyes:      General: No scleral icterus.  Cardiovascular:      Rate and Rhythm: Normal rate.   Pulmonary:      Effort: Pulmonary effort is normal. No respiratory distress.   Skin:     Coloration: Skin is not jaundiced or pale.   Neurological:      General: No focal deficit present.      Mental Status: He is alert and oriented to person, place, and time.      Gait: Gait normal.   Psychiatric:         Mood and Affect: Mood normal.         Behavior: Behavior normal.         Thought Content: Thought content normal.         Judgment: Judgment normal.         Labs:    UDS:    Lab Results   Component Value Date    BUP Screen Positive (A) 09/11/2023    BZO Negative 09/11/2023    BAR Negative 09/11/2023    RAMON Negative 09/11/2023    MAMP Negative 09/11/2023    AMP Negative 09/11/2023    MDMA Negative 09/11/2023    MTD Negative 09/11/2023    [id number] Negative 09/11/2023    OXY Negative 09/11/2023    PCP Negative 09/11/2023    THC Negative 09/11/2023    TEMP 94 F 09/11/2023    SGPOCT 1.015 09/11/2023     *POC urine drug screen does not screen for Fentanyl      Recent Results (from the past 240 hour(s))   Drugs of Abuse Screen Urine (POC CUPS) POCT    Collection Time: 09/11/23  3:26 PM   Result Value Ref Range    POCT Kit Lot Number [id number]     POCT Kit Expiration Date 2393313     Temperature Urine POCT 94 F 90 F, 92 F, 94 F, 96 F, 98 F, 100 F    Specific [place] POCT 1.015 1.005, 1.015, 1.025    pH Qual Urine POCT 4 pH 4 pH, 5 pH, 7 pH, 9 pH    Creatinine Qual Urine POCT 50 mg/dL 20 mg/dL, 50 mg/dL, 100 mg/dL, 200 mg/dL    Internal QC  Qual Urine POCT Valid Valid    Amphetamine Qual Urine POCT Negative Negative    Barbiturate Qual Urine POCT Negative Negative    Buprenorphine Qual Urine POCT Screen Positive (A) Negative    Benzodiazepine Qual Urine POCT Negative Negative    Cocaine Qual Urine POCT Negative Negative    Methamphetamine Qual Urine POCT Negative Negative    MDMA Qual Urine POCT Negative Negative    Methadone Qual Urine POCT Negative Negative    Opiate Qual Urine POCT Negative Negative    Oxycodone Qual Urine POCT Negative Negative    Phencyclidine Qual Urine POCT Negative Negative    THC Qual Urine POCT Negative Negative       Mena Alcala DO  [facility]  [address] [place] [id number] [phone]

## 2023-09-26 ENCOUNTER — HOSPITAL ENCOUNTER (EMERGENCY)
Facility: CLINIC | Age: 23
Discharge: HOME | End: 2023-09-26
Attending: PSYCHIATRY & NEUROLOGY | Admitting: PSYCHIATRY & NEUROLOGY
Payer: COMMERCIAL

## 2023-09-26 ENCOUNTER — TELEPHONE (OUTPATIENT)
Dept: BEHAVIORAL HEALTH | Facility: CLINIC | Age: 23
End: 2023-09-26
Payer: COMMERCIAL

## 2023-09-26 VITALS
OXYGEN SATURATION: 98 % | WEIGHT: 176 LBS | SYSTOLIC BLOOD PRESSURE: 121 MMHG | TEMPERATURE: 98.3 F | RESPIRATION RATE: 14 BRPM | HEART RATE: 68 BPM | DIASTOLIC BLOOD PRESSURE: 76 MMHG | HEIGHT: 69 IN | BODY MASS INDEX: 26.07 KG/M2

## 2023-09-26 DIAGNOSIS — Z76.0 ENCOUNTER FOR MEDICATION REFILL: ICD-10-CM

## 2023-09-26 DIAGNOSIS — F11.20 OPIOID USE DISORDER, SEVERE, DEPENDENCE (H): ICD-10-CM

## 2023-09-26 DIAGNOSIS — F11.20 UNCOMPLICATED OPIOID DEPENDENCE (H): ICD-10-CM

## 2023-09-26 PROCEDURE — 99283 EMERGENCY DEPT VISIT LOW MDM: CPT | Performed by: PSYCHIATRY & NEUROLOGY

## 2023-09-26 PROCEDURE — 250N000013 HC RX MED GY IP 250 OP 250 PS 637: Performed by: PSYCHIATRY & NEUROLOGY

## 2023-09-26 PROCEDURE — 99284 EMERGENCY DEPT VISIT MOD MDM: CPT | Performed by: PSYCHIATRY & NEUROLOGY

## 2023-09-26 RX ORDER — BUPRENORPHINE AND NALOXONE 2; .5 MG/1; MG/1
1 FILM, SOLUBLE BUCCAL; SUBLINGUAL DAILY
Qty: 1 FILM | Refills: 0 | Status: SHIPPED | OUTPATIENT
Start: 2023-09-26 | End: 2023-09-26 | Stop reason: ALTCHOICE

## 2023-09-26 RX ORDER — BUPRENORPHINE HYDROCHLORIDE AND NALOXONE HYDROCHLORIDE DIHYDRATE 2; .5 MG/1; MG/1
1 TABLET SUBLINGUAL ONCE
Status: COMPLETED | OUTPATIENT
Start: 2023-09-26 | End: 2023-09-26

## 2023-09-26 RX ADMIN — BUPRENORPHINE AND NALOXONE 1 TABLET: 2; .5 TABLET SUBLINGUAL at 17:25

## 2023-09-26 ASSESSMENT — ACTIVITIES OF DAILY LIVING (ADL): ADLS_ACUITY_SCORE: 35

## 2023-09-26 NOTE — DISCHARGE INSTRUCTIONS
You are provided with one dose of Suboxone here. Please follow-up at the Recovery Clinic tomorrow for ongoing refills.

## 2023-09-26 NOTE — ED TRIAGE NOTES
Triage Assessment       Row Name 09/26/23 1616       Triage Assessment (Adult)    Airway WDL WDL       Respiratory WDL    Respiratory WDL WDL       Skin Circulation/Temperature WDL    Skin Circulation/Temperature WDL WDL       Cardiac WDL    Cardiac WDL WDL       Peripheral/Neurovascular WDL    Peripheral Neurovascular WDL WDL       Cognitive/Neuro/Behavioral WDL    Cognitive/Neuro/Behavioral WDL WDL

## 2023-09-26 NOTE — ED PROVIDER NOTES
"ED Provider Note  [facility]      History     Chief Complaint   Patient presents with    Drug / Alcohol Assessment     Seeking suboxone. Clinic was closed today.  Last took it yesterday..     HPI  Reji Goldstein is a 23 year old male who is here referred from the [facility] as he was unable to get a Suboxone refill. He ran out yesterday and feels he needs a dose today otherwise he will go through withdrawal. The clinic unfortunately was closed today. He can return there tomorrow to get his refill. Patient needs one dose of 2-0.5 mg. He has no other concerns.    Past Medical History  Past Medical History:   Diagnosis Date    Substance abuse (H)      History reviewed. No pertinent surgical history.  buprenorphine HCl-naloxone HCl (SUBOXONE) 8-2 MG per film  nicotine polacrilex (NICORETTE) 4 MG gum  naloxone (NARCAN) 4 MG/0.1ML nasal spray  SENNA-docusate sodium (SENNA S) 8.6-50 MG tablet      No Known Allergies  Family History  History reviewed. No pertinent family history.  Social History   Social History     Tobacco Use    Smoking status: Former     Types: Vaping Device     Passive exposure: Never    Tobacco comments:     vape   Substance Use Topics    Alcohol use: Not Currently    Drug use: Not Currently         A medically appropriate review of systems was performed with pertinent positives and negatives noted in the HPI, and all other systems negative.    Physical Exam   BP: 121/76  Pulse: 68  Temp: 98.3  F (36.8  C)  Resp: 14  Height: 175.3 cm (5' 9\")  Weight: 79.9 kg (176 lb 1.6 oz)  SpO2: 98 %  Physical Exam  Vitals and nursing note reviewed.   HENT:      Head: Normocephalic.   Eyes:      Pupils: Pupils are equal, round, and reactive to light.   Pulmonary:      Effort: Pulmonary effort is normal.   Musculoskeletal:         General: Normal range of motion.      Cervical back: Normal range of motion.   Neurological:      General: No focal deficit present.      Mental Status: He is " alert.   Psychiatric:         Attention and Perception: Attention and perception normal. He does not perceive auditory or visual hallucinations.         Mood and Affect: Mood and affect normal.         Speech: Speech normal.         Behavior: Behavior normal. Behavior is not agitated, aggressive, hyperactive or combative. Behavior is cooperative.         Thought Content: Thought content normal. Thought content is not paranoid or delusional. Thought content does not include homicidal or suicidal ideation.         Cognition and Memory: Cognition and memory normal.         Judgment: Judgment normal.           ED Course, Procedures, & Data      Procedures                No results found for any visits on 09/26/23.  Medications - No data to display  Labs Ordered and Resulted from Time of ED Arrival to Time of ED Departure - No data to display  No orders to display          Critical care was not performed.     Medical Decision Making  The patient's presentation was of low complexity (a stable chronic illness).    The patient's evaluation involved:  review of external note(s) from 2 sources (see separate area of note for details)    The patient's management necessitated moderate risk (prescription drug management including medications given in the ED).    Assessment & Plan    Patient is here seeking a dose of Suboxone as he has run out and was unable to get a refill through his clinic. He was told to come to the ED to get a dose and can return to the clinic tomorrow for a refill of his prescription. Patient denies withdrawal symptoms presently and denies using opiates that could precipitate withdrawal. He denies unable mood symptoms. He has no other concerns.    He was given 1 dose of Suboxone 2-0.5 mg film.    Patient can be discharged. He is encouraged to follow-up with his clinic tomorrow.    I have reviewed the nursing notes. I have reviewed the findings, diagnosis, plan and need for follow up with the  patient.    Current Discharge Medication List          Final diagnoses:   Uncomplicated opioid dependence (H)   Encounter for medication refill       Mango Damico MD  [facility] EMERGENCY DEPARTMENT  9/26/2023     Mango Damico MD  09/26/23 [id number]

## 2023-09-26 NOTE — TELEPHONE ENCOUNTER
Patient presented after walk-in hours to the [facility]. Encouraged patient to return tomorrow during walk-in hours or go to the [facility] ED if in opiate withdrawal.    [facility]  [address], [address]   [place], [place], [address]  Phone: [phone]  Fax: [phone]    Open Monday-Friday  Closed over lunch hour  Walk in hours: 9am-11:30am and 12:30-3pm    Arabella Castellano RN on 9/26/2023 at 3:17 PM

## 2023-09-27 ENCOUNTER — OFFICE VISIT (OUTPATIENT)
Dept: BEHAVIORAL HEALTH | Facility: CLINIC | Age: 23
End: 2023-09-27
Payer: COMMERCIAL

## 2023-09-27 VITALS
HEART RATE: 84 BPM | SYSTOLIC BLOOD PRESSURE: 113 MMHG | DIASTOLIC BLOOD PRESSURE: 70 MMHG | OXYGEN SATURATION: 97 % | TEMPERATURE: 98.2 F

## 2023-09-27 DIAGNOSIS — K59.03 THERAPEUTIC OPIOID-INDUCED CONSTIPATION (OIC): ICD-10-CM

## 2023-09-27 DIAGNOSIS — T40.2X5A THERAPEUTIC OPIOID-INDUCED CONSTIPATION (OIC): ICD-10-CM

## 2023-09-27 DIAGNOSIS — F11.20 OPIOID USE DISORDER, SEVERE, DEPENDENCE (H): Primary | ICD-10-CM

## 2023-09-27 PROCEDURE — 80305 DRUG TEST PRSMV DIR OPT OBS: CPT | Performed by: FAMILY MEDICINE

## 2023-09-27 PROCEDURE — 99214 OFFICE O/P EST MOD 30 MIN: CPT | Performed by: FAMILY MEDICINE

## 2023-09-27 RX ORDER — BUPRENORPHINE AND NALOXONE 2; .5 MG/1; MG/1
1 FILM, SOLUBLE BUCCAL; SUBLINGUAL DAILY
Qty: 30 FILM | Refills: 0 | Status: SHIPPED | OUTPATIENT
Start: 2023-09-27 | End: 2023-10-20

## 2023-09-27 RX ORDER — POLYETHYLENE GLYCOL 3350 17 G/17G
1 POWDER, FOR SOLUTION ORAL DAILY
COMMUNITY
End: 2024-04-01

## 2023-09-27 RX ORDER — SENNA AND DOCUSATE SODIUM 50; 8.6 MG/1; MG/1
1 TABLET, FILM COATED ORAL DAILY PRN
Qty: 30 TABLET | Refills: 1 | Status: SHIPPED | OUTPATIENT
Start: 2023-09-27 | End: 2023-10-20

## 2023-09-27 ASSESSMENT — PATIENT HEALTH QUESTIONNAIRE - PHQ9: SUM OF ALL RESPONSES TO PHQ QUESTIONS 1-9: 0

## 2023-09-27 NOTE — NURSING NOTE
[facility]      Rooming information:  Approximate last use of full opioid agonist: 12/2021  Taking buprenorphine? Yes:  Has been out of Suboxone for two days.How much per day? Patient reports 2-0.5 mg TDD  Number of buprenorphine films/tablets remaining currently: 0  Side effects related to buprenorphine (constipation, dry mouth, sedation?) Yes: Constipation, needs a refill on Senna, does not feel as it is working well.   Narcan currently available: Yes  Other recent substance use:    Denies  NICOTINE-Yes: Vape occasional   If using nicotine, ready to quit? Yes: Has Nicotine gum    Point of care urine drug screen positive for:  Lab Results   Component Value Date    BUP Screen Positive (A) 09/11/2023    BZO Negative 09/11/2023    BAR Negative 09/11/2023    RAMON Negative 09/11/2023    MAMP Negative 09/11/2023    AMP Negative 09/11/2023    MDMA Negative 09/11/2023    MTD Negative 09/11/2023    [id number] Negative 09/11/2023    OXY Negative 09/11/2023    PCP Negative 09/11/2023    THC Negative 09/11/2023    TEMP 94 F 09/11/2023    SGPOCT 1.015 09/11/2023       *POC urine drug screen does not screen for Fentanyl          9/27/2023     2:00 PM   PHQ Assesment Total Score(s)   PHQ-9 Score 0       If PHQ-9 score of 15 or higher, has Recovery Clinic therapist or provider been notified? N/A    Any current suicidal ideation? No  If yes, has [facility] Clinic therapist or provider been notified? N/A    Primary care provider: Patient reports having a PCP, however, does not remember the name. Physician No Ref-Primary     Mental health provider: Denies (follow up on MH referral if needed)    Insurance needs: Active    Housing needs: Stable    Contact information up to date? Yes    3rd Party Involvement None today (please obtain ELBA if pt would like to include)    Jessica Belcher RN  September 27, 2023  2:04 PM

## 2023-10-20 ENCOUNTER — OFFICE VISIT (OUTPATIENT)
Dept: BEHAVIORAL HEALTH | Facility: CLINIC | Age: 23
End: 2023-10-20
Payer: COMMERCIAL

## 2023-10-20 VITALS — DIASTOLIC BLOOD PRESSURE: 75 MMHG | HEART RATE: 103 BPM | SYSTOLIC BLOOD PRESSURE: 114 MMHG

## 2023-10-20 DIAGNOSIS — K59.03 THERAPEUTIC OPIOID-INDUCED CONSTIPATION (OIC): ICD-10-CM

## 2023-10-20 DIAGNOSIS — T40.2X5A THERAPEUTIC OPIOID-INDUCED CONSTIPATION (OIC): ICD-10-CM

## 2023-10-20 DIAGNOSIS — F11.20 OPIOID USE DISORDER, SEVERE, DEPENDENCE (H): ICD-10-CM

## 2023-10-20 PROCEDURE — 80305 DRUG TEST PRSMV DIR OPT OBS: CPT

## 2023-10-20 PROCEDURE — 99214 OFFICE O/P EST MOD 30 MIN: CPT

## 2023-10-20 RX ORDER — SENNA AND DOCUSATE SODIUM 50; 8.6 MG/1; MG/1
1 TABLET, FILM COATED ORAL DAILY PRN
Qty: 30 TABLET | Refills: 1 | Status: SHIPPED | OUTPATIENT
Start: 2023-10-20 | End: 2024-04-01

## 2023-10-20 RX ORDER — BUPRENORPHINE AND NALOXONE 2; .5 MG/1; MG/1
1.5 FILM, SOLUBLE BUCCAL; SUBLINGUAL DAILY
Qty: 30 FILM | Refills: 0 | Status: SHIPPED | OUTPATIENT
Start: 2023-10-20 | End: 2023-11-09

## 2023-10-20 ASSESSMENT — PATIENT HEALTH QUESTIONNAIRE - PHQ9: SUM OF ALL RESPONSES TO PHQ QUESTIONS 1-9: 0

## 2023-10-20 NOTE — PROGRESS NOTES
[facility] Follow Up    ASSESSMENT/PLAN                                                      1. Opioid use disorder, severe, dependence (H)  -Needs improvement, increase in withdrawal symptoms so self increased dose to 3 mg daily.  Symptoms controlled on 3 mg  - Drugs of Abuse Screen Urine (POC CUPS) POCT  - buprenorphine HCl-naloxone HCl (SUBOXONE) 2-0.5 MG per film; Place 1.5 Film under the tongue daily for 20 days  Dispense: 30 Film; Refill: 0  -Counseling provided regarding   -Opioid warning reviewed.  Risk of overdose following a period of abstinence due to decrease tolerance was discussed including risk of death.    -Strongly recommended abstain from alcohol, benzodiazepines, THC, opioids and other drugs of abuse.    -Increased risk of return to opioid use after use of these substances discussed.      -Increased risk of overdose/death with use of other substances particularly benzodiazepines/alcohol reviewed.  -Follow up around 11/9/2023    2. Therapeutic opioid-induced constipation (OIC)  -Controlled,no change   - SENNA-docusate sodium (SENNA S) 8.6-50 MG tablet; Take 1 tablet by mouth daily as needed (constipation)  Dispense: 30 tablet; Refill: 1     Return in about 19 days (around 11/8/2023) for with any available provider.  Patient counseling completed today:  Discussed mechanism of action, potential risks/benefits/side effects of medications and other recommendations above.     Discussed risk of precipitated withdrawal with initiation of buprenorphine in the presence of full opioid agonists.    Reviewed directions for initiation of buprenorphine to reduce risk of precipitated withdrawal and maximize efficacy.    Harm reduction counseling including never use alone, availability of naloxone, risks associated with concurrent use of opioids and benzodiazepines, alcohol, or other sedatives, safer administration as applicable.  Discussed importance of avoiding isolation, building a  network of supportive relationships, avoiding people/places/things associated with past use to reduce risk of relapse; including motivational interviewing regarding psychosocial interventions.   SUBJECTIVE                                                          CC/HPI:  CC/HPI:  Reji Goldstein is a 23 year old male with PMH nicotine use disorder and opioid use disorder on buprenorphine taper per his request who presents to the Recovery Clinic for return visit.       Brief History:  Reji Goldstein was first seen in Recovery Clinic on 05/15/23. They were referred by staff from [facility] ED after pt was seen there in 3/2023 requesting rx for buprenorphine.   Patient's reasons for seeking treatment on this date include wanting to continue taper off buprenorphine.     Pt started buprenorphine for treatment of OUD through [facility] in 12/2021.  Max dose 16mg/day.  He began tapering dose on his own in Fall, 2022.  He has continued to decrease dose since that time.  He denies return to use of fentanyl since 12/2021.       Substance Use History :  Opioids:   Age at first use: 19  Current use: substance: Perc 30's ; quantity stated never counted but 10 pills would be the max he would use daily ; route: inhalation  ; timing of last use: 12/2021;                 IV drug use: No   History of overdose: Yes: one episode in a vehicle with friends 7/2020 x2  Previous residential or outpatient treatments for addiction : Yes: [facility] in 2021 in patient   Previous medication treatments for addiction: Yes: Currently is taking Suboxone   Longest period of sobriety: 12/2021 to present  Medical complications related to substance use: denies   Hepatitis C: no record of testing  HIV: 8/14/2019 HIV ag/ab nonreactive     Other Addiction History:  Stimulants   Denies   Sedatives/hypnotics/anxiolytics:   Denies   Alcohol:   Denies   Nicotine:   H/o vaping  Cannabis:   Denies   Hallucinogens/Dissociatives:   Denies   Eating disorder:  Denies   Gambling:               Denies         PAST PSYCHIATRIC HISTORY:  Diagnoses- denies   Suicide Attempts: No   Hospitalizations: No       8/28/2023     2:00 PM 9/11/2023     2:00 PM 9/27/2023     2:00 PM   PHQ   PHQ-9 Total Score 0 0 0   Q9: Thoughts of better off dead/self-harm past 2 weeks Not at all Not at all Not at all     Social History  Housing status: with family father and 8 siblings  Employment status: working at [employer] part-time  Relationship status: Single  Children: no children  Legal: denies       Most recent [facility] visit 9/27/23.    A/P last visit:  1. Opioid use disorder, severe, dependence (H)  Pt wants to remain at stable dose of buprenorphine 2mg/day vs continuing to attempt to taper at this time.  He did not want to increase dose.  Still not interested in XR buprenorphine.    - Drugs of Abuse Screen Urine (POC CUPS) POCT  - buprenorphine HCl-naloxone HCl (SUBOXONE) 2-0.5 MG per film; Place 1 Film under the tongue daily  Dispense: 30 Film; Refill: 0     2. Therapeutic opioid-induced constipation (OIC)  Recommend he take Miralax every day and use senna prn  - SENNA-docusate sodium (SENNA S) 8.6-50 MG tablet; Take 1 tablet by mouth daily as needed (constipation)  Dispense: 30 tablet; Refill: 1     Return in 4 weeks (on 10/25/2023) for Follow up, in person.    10/20/23 visit:  Started taking 3 mg due to chills and feeling fatigued. Started a new job with [employer] and job is more physical. Started 3 mg dose around October 10th, 3 mg is managing symptoms.   Constipation managed with current senna-docusate dose      Labs discussed with patient?  No      [place] Prescription Drug Monitoring Program Reviewed:  Yes;   09/27/2023 09/27/2023 2 Suboxone 2 Mg-0.5 Mg Sl Film 30.00 30  Vol [id number] Romel (3567) 0/0 2.00 mg Medicaid [place]     Medications:  buprenorphine HCl-naloxone HCl (SUBOXONE) 2-0.5 MG per film, Place 1 Film under the tongue daily  naloxone (NARCAN) 4 MG/0.1ML nasal spray, Spray 1 spray (4 mg) into one  nostril alternating nostrils as needed for opioid reversal every 2-3 minutes until assistance arrives (Patient not taking: Reported on 6/14/2023)  nicotine polacrilex (NICORETTE) 4 MG gum, Place 1 each (4 mg) inside cheek every hour as needed for smoking cessation  polyethylene glycol (MIRALAX) 17 GM/Dose powder, Take 1 Capful by mouth daily  SENNA-docusate sodium (SENNA S) 8.6-50 MG tablet, Take 1 tablet by mouth daily as needed (constipation)    No current facility-administered medications on file prior to visit.      No Known Allergies    PMH, PSH, FamHx reviewed      OBJECTIVE                                                      There were no vitals taken for this visit.    Physical Exam  HENT:      Head: Normocephalic.      Nose: Nose normal. No congestion or rhinorrhea.   Eyes:      Pupils: Pupils are equal, round, and reactive to light.   Pulmonary:      Effort: Pulmonary effort is normal.   Musculoskeletal:         General: Normal range of motion.      Cervical back: Normal range of motion.   Skin:     General: Skin is warm and dry.      Coloration: Skin is not jaundiced or pale.   Neurological:      General: No focal deficit present.      Mental Status: He is alert and oriented to person, place, and time.   Psychiatric:         Mood and Affect: Mood normal.         Thought Content: Thought content normal.         Judgment: Judgment normal.         Labs:    UDS:    Lab Results   Component Value Date    BUP Screen Positive (A) 10/20/2023    BZO Negative 10/20/2023    BAR Negative 10/20/2023    RAMON Negative 10/20/2023    MAMP Negative 10/20/2023    AMP Negative 10/20/2023    MDMA Negative 10/20/2023    MTD Negative 10/20/2023    [id number] Negative 10/20/2023    OXY Negative 10/20/2023    PCP Negative 10/20/2023    THC Negative 10/20/2023    TEMP 90 F 10/20/2023    SGPOCT 1.025 10/20/2023     *POC urine drug screen does not screen for Fentanyl      No results found for this or any previous visit (from the past 240  hour(s)).    Tana Richardson NP    [facility]  [address] [place] [id number] [phone]

## 2023-10-20 NOTE — PATIENT INSTRUCTIONS
Buprenorphine Tips:  Make sure you are letting your buprenorphine tablets or films dissolve completely under your tongue so you know you are getting all the medication.  Don't eat, drink, or talk while taking your buprenorphine.  Avoid nicotine for 15-30 minutes before taking buprenorphine - this can cause the blood vessels to constrict and you may not absorb the medication as well.  To avoid potential dental issues related to buprenorphine rinse your mouth with water after taking your dose.  Avoid brushing your teeth for 1 hour after taking a dose.     Constipation is the most common side effect of buprenorphine.  Here are some simple things you can try to ease constipation.  Eating more fiber (fruits, vegetable, and whole grains) and drinking enough fluid (urine should be light yellow).    Take Miralax (polyethylene gylcol).  Use 1 capful daily until you start to have loose stools and then decrease use.  You can also try Colace (docusate) 100mg twice daily as needed.  These medications can be prescribed or purchased OTC.  Let your provider know if you are still struggling with constipation.    What to do if you experience nausea or upset stomach after taking buprenorphine:  Make sure you are letting it dissolve completely.  After the medication has dissolved try spitting out your saliva instead of swallowing it.    Other common side effects include:   - sleepiness/drowsiness OR trouble sleeping  - headaches    Please discuss any side effects with your provider.    Important safety info:  Ensure your medication is stored in a safe place out of sight and out of reach from kids and pets, ideally locked away.   Do not combine buprenorphine with other sedating substances or medications such as alcohol, benzodiazepines (Xanax or alprazolam for example), or other opioids.  Only take medications as prescribed.

## 2023-11-09 ENCOUNTER — OFFICE VISIT (OUTPATIENT)
Dept: BEHAVIORAL HEALTH | Facility: CLINIC | Age: 23
End: 2023-11-09
Payer: COMMERCIAL

## 2023-11-09 VITALS — HEART RATE: 86 BPM | DIASTOLIC BLOOD PRESSURE: 63 MMHG | SYSTOLIC BLOOD PRESSURE: 118 MMHG

## 2023-11-09 DIAGNOSIS — K59.03 THERAPEUTIC OPIOID-INDUCED CONSTIPATION (OIC): ICD-10-CM

## 2023-11-09 DIAGNOSIS — F11.20 OPIOID USE DISORDER, SEVERE, DEPENDENCE (H): Primary | ICD-10-CM

## 2023-11-09 DIAGNOSIS — T40.2X5A THERAPEUTIC OPIOID-INDUCED CONSTIPATION (OIC): ICD-10-CM

## 2023-11-09 LAB

## 2023-11-09 PROCEDURE — 80307 DRUG TEST PRSMV CHEM ANLYZR: CPT | Performed by: NURSE PRACTITIONER

## 2023-11-09 PROCEDURE — 99214 OFFICE O/P EST MOD 30 MIN: CPT | Performed by: NURSE PRACTITIONER

## 2023-11-09 PROCEDURE — H0038 SELF-HELP/PEER SVC PER 15MIN: HCPCS

## 2023-11-09 RX ORDER — LUBIPROSTONE 24 UG/1
24 CAPSULE ORAL 2 TIMES DAILY WITH MEALS
Qty: 60 CAPSULE | Refills: 0 | Status: SHIPPED | OUTPATIENT
Start: 2023-11-09 | End: 2024-02-06

## 2023-11-09 RX ORDER — BUPRENORPHINE AND NALOXONE 2; .5 MG/1; MG/1
1.5 FILM, SOLUBLE BUCCAL; SUBLINGUAL DAILY
Qty: 45 FILM | Refills: 0 | Status: SHIPPED | OUTPATIENT
Start: 2023-11-09 | End: 2023-12-08

## 2023-11-09 ASSESSMENT — PATIENT HEALTH QUESTIONNAIRE - PHQ9: SUM OF ALL RESPONSES TO PHQ QUESTIONS 1-9: 0

## 2023-11-09 NOTE — NURSING NOTE
[facility]      Rooming information:  Approximate last use of full opioid agonist: 12/2021  Taking buprenorphine? Yes: suboxone  How much per day? 2.5 films   Number of buprenorphine films/tablets remaining currently: 6  Side effects related to buprenorphine (constipation, dry mouth, sedation?) No   Narcan currently available: Yes  Other recent substance use:    Denies  NICOTINE-Yes:   If using nicotine, ready to quit? No    Point of care urine drug screen positive for:  Lab Results   Component Value Date    BUP Screen Positive (A) 10/20/2023    BZO Negative 10/20/2023    BAR Negative 10/20/2023    RAMON Negative 10/20/2023    MAMP Negative 10/20/2023    AMP Negative 10/20/2023    MDMA Negative 10/20/2023    MTD Negative 10/20/2023    [id number] Negative 10/20/2023    OXY Negative 10/20/2023    PCP Negative 10/20/2023    THC Negative 10/20/2023    TEMP 90 F 10/20/2023    SGPOCT 1.025 10/20/2023       *POC urine drug screen does not screen for Fentanyl          10/20/2023    10:00 AM   PHQ Assesment Total Score(s)   PHQ-9 Score 0       If PHQ-9 score of 15 or higher, has Recovery [facility] therapist or provider been notified? N/A    Any current suicidal ideation? No  If yes, has [facility] therapist or provider been notified? N/A    Primary care provider: Physician No Ref-Primary     Mental health provider: none (follow up on MH referral if needed)    Insurance needs: active    Housing needs: stable    Current legal issues: none    Contact information up to date? yes    3rd Party Involvement none (please obtain ELBA if pt would like to include)    Pablo Rowland MA  November 9, 2023  2:48 PM

## 2023-11-09 NOTE — PROGRESS NOTES
[facility] Follow Up    ASSESSMENT/PLAN                                                      1. Opioid use disorder, severe, dependence (H)  Stable in sustained remission, reports last use of full opioids remains 12/2021. He began tapering his dose prior to coming to  in May. His doses was increased to 3 mg daily at his last visit. He has continued  taking this dose which is effective for him.   Continue suboxone 3 mg daily.   -Confirms narcan access.   - Drugs of Abuse Screen Urine (POC CUPS) POCT  - buprenorphine HCl-naloxone HCl (SUBOXONE) 2-0.5 MG per film; Place 1.5 Film under the tongue daily  Dispense: 45 Film; Refill: 0  - Fentanyl Qualitative with Reflex to Quant Urine; Future  - Fentanyl Qualitative with Reflex to Quant Urine    2. Therapeutic opioid-induced constipation (OIC)  Constipation not relived with miralax and senna. Feels his symptoms are getting worse.   Start Amitiza  - lubiprostone (AMITIZA) 24 MCG capsule; Take 1 capsule (24 mcg) by mouth 2 times daily (with meals)  Dispense: 60 capsule; Refill: 0         Return in about 29 days (around 12/8/2023) for Follow up, in person at 130 pm.  Patient counseling completed today:  Discussed mechanism of action, potential risks/benefits/side effects of medications and other recommendations above.     Discussed risk of precipitated withdrawal with initiation of buprenorphine in the presence of full opioid agonists.    Reviewed directions for initiation of buprenorphine to reduce risk of precipitated withdrawal and maximize efficacy.    Harm reduction counseling including never use alone, availability of naloxone, risks associated with concurrent use of opioids and benzodiazepines, alcohol, or other sedatives, safer administration as applicable.  Discussed importance of avoiding isolation, building a network of supportive relationships, avoiding people/places/things associated with past use to reduce risk of relapse; including  motivational interviewing regarding psychosocial interventions.   SUBJECTIVE                                                        CC/HPI:  Reji Goldstein is a 23 year old male with PMH nicotine use disorder and opioid use disorder on buprenorphine taper per his request who presents to the Recovery Clinic for return visit.       Brief History:  Reji Goldstein was first seen in Recovery Clinic on 05/15/23. They were referred by staff from [facility] ED after pt was seen there in 3/2023 requesting rx for buprenorphine.   Patient's reasons for seeking treatment on this date include wanting to continue taper off buprenorphine.     Pt started buprenorphine for treatment of OUD through [facility] in 12/2021.  Max dose 16mg/day.  He began tapering dose on his own in Fall, 2022.  He has continued to decrease dose since that time.  He denies return to use of fentanyl since 12/2021.       Substance Use History :  Opioids:   Age at first use: 19  Current use: substance: Perc 30's ; quantity stated never counted but 10 pills would be the max he would use daily ; route: inhalation  ; timing of last use: 12/2021;                 IV drug use: No   History of overdose: Yes: one episode in a vehicle with friends 7/2020 x2  Previous residential or outpatient treatments for addiction : Yes: [facility] in 2021 in patient   Previous medication treatments for addiction: Yes: Currently is taking Suboxone   Longest period of sobriety: 12/2021 to present  Medical complications related to substance use: denies   Hepatitis C: no record of testing  HIV: 8/14/2019 HIV ag/ab nonreactive     Other Addiction History:  Stimulants   Denies   Sedatives/hypnotics/anxiolytics:   Denies   Alcohol:   Denies   Nicotine:   H/o vaping  Cannabis:   Denies   Hallucinogens/Dissociatives:   Denies   Eating disorder:  Denies   Gambling:              Denies         PAST PSYCHIATRIC HISTORY:  Diagnoses- denies   Suicide Attempts: No   Hospitalizations: No       9/27/2023     2:00  PM 10/20/2023    10:00 AM 11/9/2023     2:00 PM   PHQ   PHQ-9 Total Score 0 0 0   Q9: Thoughts of better off dead/self-harm past 2 weeks Not at all Not at all Not at all     Social History  Housing status: with family father and 8 siblings  Employment status: working at [employer] part-time  Relationship status: Single  Children: no children  Legal: denies       Most recent Recovery Clinic visit 10/20/23  . Started taking 3 mg due to chills and feeling fatigued. Started a new job with [employer] and job is more physical. Started 3 mg dose around October 10th, 3 mg is managing symptoms.   Constipation managed with current senna-docusate dose      A/P last visit:  1. Opioid use disorder, severe, dependence (H)  -Needs improvement, increase in withdrawal symptoms so self increased dose to 3 mg daily.  Symptoms controlled on 3 mg  - Drugs of Abuse Screen Urine (POC CUPS) POCT  - buprenorphine HCl-naloxone HCl (SUBOXONE) 2-0.5 MG per film; Place 1.5 Film under the tongue daily for 20 days  Dispense: 30 Film; Refill: 0  -Counseling provided regarding   -Opioid warning reviewed.  Risk of overdose following a period of abstinence due to decrease tolerance was discussed including risk of death.    -Strongly recommended abstain from alcohol, benzodiazepines, THC, opioids and other drugs of abuse.    -Increased risk of return to opioid use after use of these substances discussed.                    -Increased risk of overdose/death with use of other substances particularly benzodiazepines/alcohol reviewed.  -Follow up around 11/9/2023     2. Therapeutic opioid-induced constipation (OIC)  -Controlled,no change   - SENNA-docusate sodium (SENNA S) 8.6-50 MG tablet; Take 1 tablet by mouth daily as needed (constipation)  Dispense: 30 tablet; Refill: 1    11/09/23 visit:  Reports last use remains 12/2021. Taking suboxone 3 mg daily.   Avoiding people and places. Spending time working, playing video games, and hanging with family.   Has met  new friends through work and soccer.  Still having constipation, senna daily not working even with lots of water.   Taking miralax most days too. Feel like the constipation is getting worse.   Labs discussed with patient?  Yes      [place] Prescription Drug Monitoring Program Reviewed:  Yes  10/20/2023 10/20/2023 2 Suboxone 2 Mg-0.5 Mg Sl Film 30.00 20 Ta [facility] [id number] Romel ([id number]) 0/0 3.00 mg Medicaid [place]   09/27/2023 09/27/2023 2 Suboxone 2 Mg-0.5 Mg Sl Film 30.00 30 Li Vol [id number]          Medications:  naloxone (NARCAN) 4 MG/0.1ML nasal spray, Spray 1 spray (4 mg) into one nostril alternating nostrils as needed for opioid reversal every 2-3 minutes until assistance arrives (Patient not taking: Reported on 6/14/2023)  nicotine polacrilex (NICORETTE) 4 MG gum, Place 1 each (4 mg) inside cheek every hour as needed for smoking cessation  polyethylene glycol (MIRALAX) 17 GM/Dose powder, Take 1 Capful by mouth daily  SENNA-docusate sodium (SENNA S) 8.6-50 MG tablet, Take 1 tablet by mouth daily as needed (constipation)    No current facility-administered medications on file prior to visit.      No Known Allergies    PMH, PSH, FamHx reviewed      OBJECTIVE                                                      /63   Pulse 86     Physical Exam  HENT:      Head: Normocephalic.      Nose: No congestion or rhinorrhea.   Eyes:      Conjunctiva/sclera: Conjunctivae normal.   Cardiovascular:      Rate and Rhythm: Normal rate.   Pulmonary:      Effort: Pulmonary effort is normal.   Neurological:      General: No focal deficit present.      Mental Status: He is alert and oriented to person, place, and time.   Psychiatric:         Attention and Perception: Attention normal.         Mood and Affect: Mood normal.         Speech: Speech normal.         Behavior: Behavior is cooperative.         Thought Content: Thought content normal.         Cognition and Memory: Cognition normal.         Judgment: Judgment normal.          Labs:    UDS:    Lab Results   Component Value Date    BUP Screen Positive (A) 11/09/2023    BZO Negative 11/09/2023    BAR Negative 11/09/2023    RAMON Negative 11/09/2023    MAMP Negative 11/09/2023    AMP Negative 11/09/2023    MDMA Negative 11/09/2023    MTD Negative 11/09/2023    [id number] Negative 11/09/2023    OXY Negative 11/09/2023    PCP Negative 11/09/2023    THC Negative 11/09/2023    TEMP 94 F 11/09/2023    SGPOCT 1.015 11/09/2023     *POC urine drug screen does not screen for Fentanyl      Recent Results (from the past 240 hour(s))   Drugs of Abuse Screen Urine (POC CUPS) POCT    Collection Time: 11/09/23  3:27 PM   Result Value Ref Range    POCT Kit Lot Number [id number]     POCT Kit Expiration Date 6/26/25     Temperature Urine POCT 94 F 90 F, 92 F, 94 F, 96 F, 98 F, 100 F    Specific [place] POCT 1.015 1.005, 1.015, 1.025    pH Qual Urine POCT 9 pH 4 pH, 5 pH, 7 pH, 9 pH    Creatinine Qual Urine POCT 20 mg/dL 20 mg/dL, 50 mg/dL, 100 mg/dL, 200 mg/dL    Internal QC Qual Urine POCT Valid Valid    Amphetamine Qual Urine POCT Negative Negative    Barbiturate Qual Urine POCT Negative Negative    Buprenorphine Qual Urine POCT Screen Positive (A) Negative    Benzodiazepine Qual Urine POCT Negative Negative    Cocaine Qual Urine POCT Negative Negative    Methamphetamine Qual Urine POCT Negative Negative    MDMA Qual Urine POCT Negative Negative    Methadone Qual Urine POCT Negative Negative    Opiate Qual Urine POCT Negative Negative    Oxycodone Qual Urine POCT Negative Negative    Phencyclidine Qual Urine POCT Negative Negative    THC Qual Urine POCT Negative Negative           At least 30 min spent on day of encounter in review of medical record,  review, obtaining histories, discussing recommendations, counseling/coordination of care    Mera Reyes [facility]  [address] [place] [id number]  [phone]

## 2023-11-09 NOTE — PROGRESS NOTES
[facility]    Peer  met with Reji Goldstein in the Recovery Clinic to introduce herself, detail services provided and discuss current status of recovery. Pt appeared alert, oriented and open to feedback during our discussion.     Pt arrives for visit with provider for suboxone.  AMBER sees patient today under provider diagnosis of opioid substance disorder, severe, dependence  (H)       Reji explained to Peer   that his  recovery  is going well. He further mentioned that he is currently working at [employer] ..                        PRC provided business card to pt welcoming contact for recovery based support and resources. PRC and pt agree to speak again during an upcoming  visit.           Service Type:     Individual     Session Start Time:  3:00                     Session End Time:    3:05    Session Length:         5 mins    Patient Goal:   To utilize suboxone assisted treatment for sobriety and long term recovery.     Goal Progress:   Ongoing.    Key Risk Factors to Recovery:   PRC encourages being aware of risk factors that can lead to re-use which include avoiding isolation, avoiding triggers and managing cravings in a healthy manner. being open and willing to acceptance and change on a daily basis.  PRC encourages pt to establish a sober network calling tree to reach out to when needed.  Continue to practice honesty with ourselves and trusted support person(s).   PRC encourages regular attendance at recovery based meetings as well as finding a sponsor for mentoring and accountability.   PRC encourages consideration of other services such as counseling for mental health issues which can correlate with our substance use.      Support Needs:   Ongoing care, support and resources for opioid substance use disorder.     Follow up:   AMBER has provided pt with her contact information for support and resource needs.    PRC and pt agree to meet during an  upcoming  visit.       [facility]  [address], [address]   [place], [place], [address]  Clinic Phone: [phone]  Clinic Fax: [phone]  Peer  phone: [phone]    Open Monday - Friday  9:00am-4:00pm  Walk in hours: 9am-3pm      Iona Olivares  November 9, 2023  3:05 PM    MAURI Vigil provides clinical oversite and supervision of care.

## 2023-12-08 ENCOUNTER — OFFICE VISIT (OUTPATIENT)
Dept: BEHAVIORAL HEALTH | Facility: CLINIC | Age: 23
End: 2023-12-08
Payer: COMMERCIAL

## 2023-12-08 VITALS — DIASTOLIC BLOOD PRESSURE: 69 MMHG | SYSTOLIC BLOOD PRESSURE: 115 MMHG | HEART RATE: 97 BPM | OXYGEN SATURATION: 97 %

## 2023-12-08 DIAGNOSIS — F11.20 OPIOID USE DISORDER, SEVERE, DEPENDENCE (H): Primary | ICD-10-CM

## 2023-12-08 DIAGNOSIS — T40.2X5A THERAPEUTIC OPIOID-INDUCED CONSTIPATION (OIC): ICD-10-CM

## 2023-12-08 DIAGNOSIS — K59.03 THERAPEUTIC OPIOID-INDUCED CONSTIPATION (OIC): ICD-10-CM

## 2023-12-08 PROCEDURE — 80305 DRUG TEST PRSMV DIR OPT OBS: CPT | Performed by: FAMILY MEDICINE

## 2023-12-08 PROCEDURE — 99214 OFFICE O/P EST MOD 30 MIN: CPT | Performed by: FAMILY MEDICINE

## 2023-12-08 RX ORDER — BUPRENORPHINE AND NALOXONE 2; .5 MG/1; MG/1
1.5 FILM, SOLUBLE BUCCAL; SUBLINGUAL DAILY
Qty: 45 FILM | Refills: 0 | Status: SHIPPED | OUTPATIENT
Start: 2023-12-08 | End: 2024-01-08

## 2023-12-08 ASSESSMENT — PATIENT HEALTH QUESTIONNAIRE - PHQ9: SUM OF ALL RESPONSES TO PHQ QUESTIONS 1-9: 0

## 2023-12-08 NOTE — NURSING NOTE
[facility]      Rooming information:  Approximate last use of full opioid agonist: December 2021  Taking buprenorphine? Yes:   How much per day? 3 mg per day  Number of buprenorphine films/tablets remaining currently: About 6 films  Side effects related to buprenorphine (constipation, dry mouth, sedation?) No   Narcan currently available: Yes  Other recent substance use:    Denies  NICOTINE-Yes: Vapes  If using nicotine, ready to quit? Yes: Trying - uses gum    Point of care urine drug screen positive for:  Lab Results   Component Value Date    BUP Screen Positive (A) 12/08/2023    BZO Negative 12/08/2023    BAR Negative 12/08/2023    RAMON Negative 12/08/2023    MAMP Negative 12/08/2023    AMP Negative 12/08/2023    MDMA Negative 12/08/2023    MTD Negative 12/08/2023    [id number] Negative 12/08/2023    OXY Negative 12/08/2023    PCP Negative 12/08/2023    THC Negative 12/08/2023    TEMP 90 F 12/08/2023    SGPOCT 1.025 12/08/2023       *POC urine drug screen does not screen for Fentanyl        12/8/2023     1:00 PM   PHQ Assesment Total Score(s)   PHQ-9 Score 0       If PHQ-9 score of 15 or higher, has Recovery Clinic therapist or provider been notified? N/A    Any current suicidal ideation? No  If yes, has Recovery Clinic therapist or provider been notified? N/A    Primary care provider: Physician No Ref-Primary     Mental health provider: None (follow up on MH referral if needed)    Insurance needs: Active    Housing needs: Stable    Current legal issues: none    Contact information up to date? Yes    3rd Party Involvement: None today (please obtain ELBA if pt would like to include)    Bina Martinez RN  December 8, 2023  1:47 PM

## 2023-12-08 NOTE — PROGRESS NOTES
[facility] Follow Up    ASSESSMENT/PLAN                                                      1. Opioid use disorder, severe, dependence (H)  Reports symptoms well controlled.  Continue Suboxone, no change (3mg TDD).  Has Narcan.  Encouraged recovery activities.    - Drugs of Abuse Screen Urine (POC CUPS) POCT  - buprenorphine HCl-naloxone HCl (SUBOXONE) 2-0.5 MG per film; Place 1.5 Film under the tongue daily  Dispense: 45 Film; Refill: 0    2. Therapeutic opioid-induced constipation (OIC)  Encouraged him to  Amitiza at pharmacy.       Return in about 4 weeks (around 1/5/2024) for Follow up, in person.    Patient counseling completed today:  Discussed mechanism of action, potential risks/benefits/side effects of medications and other recommendations above.     Discussed risk of precipitated withdrawal with initiation of buprenorphine in the presence of full opioid agonists.    Reviewed directions for initiation of buprenorphine to reduce risk of precipitated withdrawal and maximize efficacy.    Harm reduction counseling including never use alone, availability of naloxone, risks associated with concurrent use of opioids and benzodiazepines, alcohol, or other sedatives, safer administration as applicable.  Discussed importance of avoiding isolation, building a network of supportive relationships, avoiding people/places/things associated with past use to reduce risk of relapse; including motivational interviewing regarding psychosocial interventions.   SUBJECTIVE                                                          CC/HPI:  Reji Goldstein is a 23 year old male with PMH nicotine use disorder and opioid use disorder on buprenorphine taper per his request who presents to the Recovery Clinic for return visit.       Brief History:  Reji Goldstein was first seen in Recovery Clinic on 05/15/23. They were referred by staff from [facility] ED after pt was seen there in 3/2023 requesting rx for  buprenorphine.   Patient's reasons for seeking treatment on this date included wanting to continue taper off buprenorphine.     Pt started buprenorphine for treatment of OUD through [facility] in 12/2021.  Max dose 16mg/day.  He began tapering dose on his own in Fall, 2022.  He has continued to decrease dose since that time.  He denies return to use of fentanyl since 12/2021.       Substance Use History :  Opioids:   Age at first use: 19  Current use: substance: Perc 30's ; quantity stated never counted but 10 pills would be the max he would use daily ; route: inhalation  ; timing of last use: 12/2021;                 IV drug use: No   History of overdose: Yes: one episode in a vehicle with friends 7/2020 x2  Previous residential or outpatient treatments for addiction : Yes: kerrie in 2021 in patient   Previous medication treatments for addiction: Yes: Currently is taking Suboxone   Longest period of sobriety: 12/2021 to present  Medical complications related to substance use: denies   Hepatitis C: no record of testing  HIV: 8/14/2019 HIV ag/ab nonreactive     Other Addiction History:  Stimulants   Denies   Sedatives/hypnotics/anxiolytics:   Denies   Alcohol:   Denies   Nicotine:   H/o vaping  Cannabis:   Denies   Hallucinogens/Dissociatives:   Denies   Eating disorder:  Denies   Gambling:              Denies       PAST PSYCHIATRIC HISTORY:  Diagnoses- denies   Suicide Attempts: No   Hospitalizations: No         10/20/2023    10:00 AM 11/9/2023     2:00 PM 12/8/2023     1:00 PM   PHQ   PHQ-9 Total Score 0 0 0   Q9: Thoughts of better off dead/self-harm past 2 weeks Not at all Not at all Not at all     Social History  Housing status: with family father and 8 siblings  Employment status: working at [employer] part-time  Relationship status: Single  Children: no children  Legal: denies       Most recent Recovery Clinic visit: 11/9/23    A/P last visit:  1. Opioid use disorder, severe, dependence (H)  Stable in sustained  remission, reports last use of full opioids remains 12/2021. He began tapering his dose prior to coming to  in May. His doses was increased to 3 mg daily at his last visit. He has continued  taking this dose which is effective for him.   Continue suboxone 3 mg daily.   -Confirms narcan access.   - Drugs of Abuse Screen Urine (POC CUPS) POCT  - buprenorphine HCl-naloxone HCl (SUBOXONE) 2-0.5 MG per film; Place 1.5 Film under the tongue daily  Dispense: 45 Film; Refill: 0  - Fentanyl Qualitative with Reflex to Quant Urine; Future  - Fentanyl Qualitative with Reflex to Quant Urine     2. Therapeutic opioid-induced constipation (OIC)  Constipation not relived with miralax and senna. Feels his symptoms are getting worse.   Start Amitiza  - lubiprostone (AMITIZA) 24 MCG capsule; Take 1 capsule (24 mcg) by mouth 2 times daily (with meals)  Dispense: 60 capsule; Refill: 0    12/08/23 visit:  Taking Suboxone 3mg per day (1.5 films)  No side effects, no cravings  Has not started Amitiza, will  today  Mood has been stable  Sleeping well (works evening shift, gets off at midnight)  Appetite is stable  Weight has been stable  No current recovery activities  Going to gym     Labs discussed with patient?  Yes      [place] Prescription Drug Monitoring Program Reviewed:  Yes; as expected    Medications:  nicotine polacrilex (NICORETTE) 4 MG gum, Place 1 each (4 mg) inside cheek every hour as needed for smoking cessation  polyethylene glycol (MIRALAX) 17 GM/Dose powder, Take 1 Capful by mouth daily  lubiprostone (AMITIZA) 24 MCG capsule, Take 1 capsule (24 mcg) by mouth 2 times daily (with meals) (Patient not taking: Reported on 12/8/2023)  naloxone (NARCAN) 4 MG/0.1ML nasal spray, Spray 1 spray (4 mg) into one nostril alternating nostrils as needed for opioid reversal every 2-3 minutes until assistance arrives (Patient not taking: Reported on 6/14/2023)  SENNA-docusate sodium (SENNA S) 8.6-50 MG tablet, Take 1 tablet by  mouth daily as needed (constipation) (Patient not taking: Reported on 12/8/2023)    No current facility-administered medications on file prior to visit.      No Known Allergies    PMH, PSH, FamHx reviewed      OBJECTIVE                                                      /69   Pulse 97   SpO2 97%     Physical Exam  Vitals and nursing note reviewed.   Constitutional:       General: He is not in acute distress.     Appearance: Normal appearance. He is not ill-appearing or diaphoretic.   Eyes:      General: No scleral icterus.  Cardiovascular:      Rate and Rhythm: Normal rate.   Pulmonary:      Effort: Pulmonary effort is normal. No respiratory distress.   Neurological:      General: No focal deficit present.      Mental Status: He is alert and oriented to person, place, and time.      Gait: Gait normal.   Psychiatric:         Mood and Affect: Mood normal.         Behavior: Behavior normal.         Thought Content: Thought content normal.         Judgment: Judgment normal.         Labs:    UDS:    Lab Results   Component Value Date    BUP Screen Positive (A) 12/08/2023    BZO Negative 12/08/2023    BAR Negative 12/08/2023    RAMON Negative 12/08/2023    MAMP Negative 12/08/2023    AMP Negative 12/08/2023    MDMA Negative 12/08/2023    MTD Negative 12/08/2023    [id number] Negative 12/08/2023    OXY Negative 12/08/2023    PCP Negative 12/08/2023    THC Negative 12/08/2023    TEMP 90 F 12/08/2023    SGPOCT 1.025 12/08/2023     *POC urine drug screen does not screen for Fentanyl      Recent Results (from the past 240 hour(s))   Drugs of Abuse Screen Urine (POC CUPS) POCT    Collection Time: 12/08/23  1:53 PM   Result Value Ref Range    POCT Kit Lot Number [id number]     POCT Kit Expiration Date 2025-08-22     Temperature Urine POCT 90 F 90 F, 92 F, 94 F, 96 F, 98 F, 100 F    Specific [place] POCT 1.025 1.005, 1.015, 1.025    pH Qual Urine POCT 7 pH 4 pH, 5 pH, 7 pH, 9 pH    Creatinine Qual Urine POCT 50 mg/dL 20 mg/dL,  50 mg/dL, 100 mg/dL, 200 mg/dL    Internal QC Qual Urine POCT Valid Valid    Amphetamine Qual Urine POCT Negative Negative    Barbiturate Qual Urine POCT Negative Negative    Buprenorphine Qual Urine POCT Screen Positive (A) Negative    Benzodiazepine Qual Urine POCT Negative Negative    Cocaine Qual Urine POCT Negative Negative    Methamphetamine Qual Urine POCT Negative Negative    MDMA Qual Urine POCT Negative Negative    Methadone Qual Urine POCT Negative Negative    Opiate Qual Urine POCT Negative Negative    Oxycodone Qual Urine POCT Negative Negative    Phencyclidine Qual Urine POCT Negative Negative    THC Qual Urine POCT Negative Negative           Mena Alcala, DO  [facility]  [address] [place] [id number] [phone]

## 2024-01-08 ENCOUNTER — OFFICE VISIT (OUTPATIENT)
Dept: BEHAVIORAL HEALTH | Facility: CLINIC | Age: 24
End: 2024-01-08
Payer: COMMERCIAL

## 2024-01-08 VITALS
HEIGHT: 70 IN | WEIGHT: 185.5 LBS | DIASTOLIC BLOOD PRESSURE: 75 MMHG | HEART RATE: 95 BPM | BODY MASS INDEX: 26.56 KG/M2 | SYSTOLIC BLOOD PRESSURE: 123 MMHG | OXYGEN SATURATION: 98 %

## 2024-01-08 DIAGNOSIS — F11.20 OPIOID USE DISORDER, SEVERE, DEPENDENCE (H): Primary | ICD-10-CM

## 2024-01-08 DIAGNOSIS — T40.2X5A THERAPEUTIC OPIOID-INDUCED CONSTIPATION (OIC): ICD-10-CM

## 2024-01-08 DIAGNOSIS — K59.03 THERAPEUTIC OPIOID-INDUCED CONSTIPATION (OIC): ICD-10-CM

## 2024-01-08 PROCEDURE — 99214 OFFICE O/P EST MOD 30 MIN: CPT | Performed by: FAMILY MEDICINE

## 2024-01-08 PROCEDURE — 80305 DRUG TEST PRSMV DIR OPT OBS: CPT | Performed by: FAMILY MEDICINE

## 2024-01-08 RX ORDER — BUPRENORPHINE AND NALOXONE 2; .5 MG/1; MG/1
1.5 FILM, SOLUBLE BUCCAL; SUBLINGUAL DAILY
Qty: 45 FILM | Refills: 0 | Status: SHIPPED | OUTPATIENT
Start: 2024-01-08 | End: 2024-02-06

## 2024-01-08 ASSESSMENT — PATIENT HEALTH QUESTIONNAIRE - PHQ9: SUM OF ALL RESPONSES TO PHQ QUESTIONS 1-9: 0

## 2024-01-08 ASSESSMENT — PAIN SCALES - GENERAL: PAINLEVEL: NO PAIN (1)

## 2024-01-08 NOTE — PROGRESS NOTES
[facility] Follow Up    ASSESSMENT/PLAN                                                      1. Opioid use disorder, severe, dependence (H)  Stable.  Continue Suboxone, no change.   - Drugs of Abuse Screen Urine (POC CUPS) POCT  - buprenorphine HCl-naloxone HCl (SUBOXONE) 2-0.5 MG per film; Place 1.5 Film under the tongue daily  Dispense: 45 Film; Refill: 0    2. Therapeutic opioid-induced constipation (OIC)  Improved.  Continue Amitiza.       Return in about 4 weeks (around 2/5/2024) for Follow up, in person.    Patient counseling completed today:  Discussed mechanism of action, potential risks/benefits/side effects of medications and other recommendations above.     Discussed risk of precipitated withdrawal with initiation of buprenorphine in the presence of full opioid agonists.    Reviewed directions for initiation of buprenorphine to reduce risk of precipitated withdrawal and maximize efficacy.    Harm reduction counseling including never use alone, availability of naloxone, risks associated with concurrent use of opioids and benzodiazepines, alcohol, or other sedatives, safer administration as applicable.  Discussed importance of avoiding isolation, building a network of supportive relationships, avoiding people/places/things associated with past use to reduce risk of relapse; including motivational interviewing regarding psychosocial interventions.   SUBJECTIVE                                                          CC/HPI:  Reji Goldstein is a 23 year old male with PMH nicotine use disorder and opioid use disorder on buprenorphine taper per his request who presents to the Recovery Clinic for return visit.       Brief History:  Reji Goldstein was first seen in Recovery Clinic on 05/15/23. They were referred by staff from [facility] ED after pt was seen there in 3/2023 requesting rx for buprenorphine.   Patient's reasons for seeking treatment on this date included wanting to continue taper off  buprenorphine.     Pt started buprenorphine for treatment of OUD through [facility] in 12/2021.  Max dose 16mg/day.  He began tapering dose on his own in Fall, 2022.  He has continued to decrease dose since that time.  He denies return to use of fentanyl since 12/2021.       Substance Use History :  Opioids:   Age at first use: 19  Current use: substance: Perc 30's ; quantity stated never counted but 10 pills would be the max he would use daily ; route: inhalation  ; timing of last use: 12/2021;                 IV drug use: No   History of overdose: Yes: one episode in a vehicle with friends 7/2020 x2  Previous residential or outpatient treatments for addiction : Yes: [facility] in 2021 in patient   Previous medication treatments for addiction: Yes: Currently is taking Suboxone   Longest period of sobriety: 12/2021 to present  Medical complications related to substance use: denies   Hepatitis C: no record of testing  HIV: 8/14/2019 HIV ag/ab nonreactive     Other Addiction History:  Stimulants   Denies   Sedatives/hypnotics/anxiolytics:   Denies   Alcohol:   Denies   Nicotine:   H/o vaping  Cannabis:   Denies   Hallucinogens/Dissociatives:   Denies   Eating disorder:  Denies   Gambling:              Denies       PAST PSYCHIATRIC HISTORY:  Diagnoses- denies   Suicide Attempts: No   Hospitalizations: No         11/9/2023     2:00 PM 12/8/2023     1:00 PM 1/8/2024    10:00 AM   PHQ   PHQ-9 Total Score 0 0 0   Q9: Thoughts of better off dead/self-harm past 2 weeks Not at all Not at all Not at all       Social History  Housing status: with family father and 8 siblings  Employment status: working at [employer] part-time  Relationship status: Single  Children: no children  Legal: denies       Most recent [facility] Clinic visit :  12/8/23  A/P last visit:  1. Opioid use disorder, severe, dependence (H)  Reports symptoms well controlled.  Continue Suboxone, no change (3mg TDD).  Has Narcan.  Encouraged recovery activities.    - Drugs of  "Abuse Screen Urine (POC CUPS) POCT  - buprenorphine HCl-naloxone HCl (SUBOXONE) 2-0.5 MG per film; Place 1.5 Film under the tongue daily  Dispense: 45 Film; Refill: 0     2. Therapeutic opioid-induced constipation (OIC)  Encouraged him to  Amitiza at pharmacy.    01/08/24 visit:  Things are going well  Continues working at [employer]  No opioid cravings  Taking Suboxone 3mg daily  No significant side effects, Amitiza has been helpful for constipation  Going to the gym frequently   Mood has been good  Sleeping well      Labs discussed with patient?  Yes      [place] Prescription Drug Monitoring Program Reviewed:  Yes; as expected    Medications:  lubiprostone (AMITIZA) 24 MCG capsule, Take 1 capsule (24 mcg) by mouth 2 times daily (with meals)  naloxone (NARCAN) 4 MG/0.1ML nasal spray, Spray 1 spray (4 mg) into one nostril alternating nostrils as needed for opioid reversal every 2-3 minutes until assistance arrives  nicotine polacrilex (NICORETTE) 4 MG gum, Place 1 each (4 mg) inside cheek every hour as needed for smoking cessation  polyethylene glycol (MIRALAX) 17 GM/Dose powder, Take 1 Capful by mouth daily (Patient not taking: Reported on 1/8/2024)  SENNA-docusate sodium (SENNA S) 8.6-50 MG tablet, Take 1 tablet by mouth daily as needed (constipation) (Patient not taking: Reported on 12/8/2023)    No current facility-administered medications on file prior to visit.      No Known Allergies    PMH, PSH, FamHx reviewed      OBJECTIVE                                                      /75 (BP Location: Left arm, Patient Position: Sitting, Cuff Size: Adult Regular)   Pulse 95   Ht 1.778 m (5' 10\")   Wt 84.1 kg (185 lb 8 oz)   SpO2 98%   BMI 26.62 kg/m      Physical Exam  Vitals and nursing note reviewed.   Constitutional:       General: He is not in acute distress.     Appearance: Normal appearance. He is not ill-appearing or diaphoretic.   Cardiovascular:      Rate and Rhythm: Normal rate. "   Pulmonary:      Effort: Pulmonary effort is normal. No respiratory distress.   Neurological:      General: No focal deficit present.      Mental Status: He is alert and oriented to person, place, and time.   Psychiatric:         Mood and Affect: Mood normal.         Behavior: Behavior normal.         Thought Content: Thought content normal.         Judgment: Judgment normal.         Labs:    UDS:    Lab Results   Component Value Date    BUP Screen Positive (A) 01/08/2024    BZO Negative 01/08/2024    BAR Negative 01/08/2024    RAMON Negative 01/08/2024    MAMP Negative 01/08/2024    AMP Negative 01/08/2024    MDMA Negative 01/08/2024    MTD Negative 01/08/2024    [id number] Negative 01/08/2024    OXY Negative 01/08/2024    PCP Negative 01/08/2024    THC Negative 01/08/2024    TEMP 90 F 01/08/2024    SGPOCT 1.025 01/08/2024     *POC urine drug screen does not screen for Fentanyl      No results found for this or any previous visit (from the past 240 hour(s)).          Mena Alcala, [facility]  [address] [place] [id number]  [phone]

## 2024-01-08 NOTE — PROGRESS NOTES
[facility]      Rooming information:  Approximate last use of full opioid agonist: December 2021  Taking buprenorphine? Yes:   How much per day? 3 mg  Number of buprenorphine films/tablets remaining currently: 3  Side effects related to buprenorphine (constipation, dry mouth, sedation?) Yes:    Narcan currently available: Yes  Other recent substance use:    Denies  NICOTINE-Yes:   If using nicotine, ready to quit? No    Point of care urine drug screen positive for:  Lab Results   Component Value Date    BUP Screen Positive (A) 01/08/2024    BZO Negative 01/08/2024    BAR Negative 01/08/2024    RAMON Negative 01/08/2024    MAMP Negative 01/08/2024    AMP Negative 01/08/2024    MDMA Negative 01/08/2024    MTD Negative 01/08/2024    [id number] Negative 01/08/2024    OXY Negative 01/08/2024    PCP Negative 01/08/2024    THC Negative 01/08/2024    TEMP 90 F 01/08/2024    SGPOCT 1.025 01/08/2024       *POC urine drug screen does not screen for Fentanyl          1/8/2024    10:00 AM   PHQ Assesment Total Score(s)   PHQ-9 Score 0       If PHQ-9 score of 15 or higher, has Recovery Clinic therapist or provider been notified? No    Any current suicidal ideation? No  If yes, has Recovery Clinic therapist or provider been notified? N/A    Primary care provider: Physician No Ref-Primary     Mental health provider: none (follow up on MH referral if needed)    Insurance needs: active    Housing needs: stable    Current legal issues: denies    Contact information up to date? yes    3rd Party Involvement NA (please obtain ELBA if pt would like to include)    Sravanthi Wagner LPN  January 8, 2024  10:25 AM

## 2024-02-06 ENCOUNTER — OFFICE VISIT (OUTPATIENT)
Dept: BEHAVIORAL HEALTH | Facility: CLINIC | Age: 24
End: 2024-02-06
Payer: COMMERCIAL

## 2024-02-06 VITALS — SYSTOLIC BLOOD PRESSURE: 132 MMHG | DIASTOLIC BLOOD PRESSURE: 78 MMHG | HEART RATE: 88 BPM

## 2024-02-06 DIAGNOSIS — T40.2X5A THERAPEUTIC OPIOID-INDUCED CONSTIPATION (OIC): ICD-10-CM

## 2024-02-06 DIAGNOSIS — F11.20 OPIOID USE DISORDER, SEVERE, DEPENDENCE (H): Primary | ICD-10-CM

## 2024-02-06 DIAGNOSIS — K59.03 THERAPEUTIC OPIOID-INDUCED CONSTIPATION (OIC): ICD-10-CM

## 2024-02-06 DIAGNOSIS — F17.200 NICOTINE USE DISORDER: ICD-10-CM

## 2024-02-06 LAB
AMPHETAMINE QUAL URINE POCT: NEGATIVE
BARBITURATE QUAL URINE POCT: NEGATIVE
BENZODIAZEPINE QUAL URINE POCT: NEGATIVE
BUPRENORPHINE QUAL URINE POCT: ABNORMAL
COCAINE QUAL URINE POCT: NEGATIVE
CREATININE QUAL URINE POCT: ABNORMAL
INTERNAL QC QUAL URINE POCT: ABNORMAL
MDMA QUAL URINE POCT: NEGATIVE
METHADONE QUAL URINE POCT: NEGATIVE
METHAMPHETAMINE QUAL URINE POCT: NEGATIVE
OPIATE QUAL URINE POCT: NEGATIVE
OXYCODONE QUAL URINE POCT: NEGATIVE
PH QUAL URINE POCT: ABNORMAL
PHENCYCLIDINE QUAL URINE POCT: NEGATIVE
POCT KIT EXPIRATION DATE: ABNORMAL
POCT KIT LOT NUMBER: ABNORMAL
SPECIFIC GRAVITY POCT: 1
TEMPERATURE URINE POCT: ABNORMAL
THC QUAL URINE POCT: NEGATIVE

## 2024-02-06 PROCEDURE — 80305 DRUG TEST PRSMV DIR OPT OBS: CPT | Performed by: NURSE PRACTITIONER

## 2024-02-06 PROCEDURE — 99214 OFFICE O/P EST MOD 30 MIN: CPT | Performed by: NURSE PRACTITIONER

## 2024-02-06 RX ORDER — LUBIPROSTONE 24 UG/1
24 CAPSULE ORAL 2 TIMES DAILY WITH MEALS
Qty: 60 CAPSULE | Refills: 1 | Status: SHIPPED | OUTPATIENT
Start: 2024-02-06 | End: 2024-04-01

## 2024-02-06 RX ORDER — BUPRENORPHINE AND NALOXONE 2; .5 MG/1; MG/1
1.5 FILM, SOLUBLE BUCCAL; SUBLINGUAL DAILY
Qty: 45 FILM | Refills: 0 | Status: SHIPPED | OUTPATIENT
Start: 2024-02-06 | End: 2024-03-06

## 2024-02-06 ASSESSMENT — PATIENT HEALTH QUESTIONNAIRE - PHQ9: SUM OF ALL RESPONSES TO PHQ QUESTIONS 1-9: 0

## 2024-02-06 NOTE — NURSING NOTE
[facility]      Rooming information:  Approximate last use of full opioid agonist: December 2021  Taking buprenorphine? Yes: suboxone  How much per day? 3mg  Number of buprenorphine films/tablets remaining currently:   Side effects related to buprenorphine (constipation, dry mouth, sedation?) Yes:    Narcan currently available: Yes  Other recent substance use:    Denies  NICOTINE-Yes:   If using nicotine, ready to quit?     Point of care urine drug screen positive for:  Lab Results   Component Value Date    BUP Screen Positive (A) 02/06/2024    BZO Negative 02/06/2024    BAR Negative 02/06/2024    RAMON Negative 02/06/2024    MAMP Negative 02/06/2024    AMP Negative 02/06/2024    MDMA Negative 02/06/2024    MTD Negative 02/06/2024    [id number] Negative 02/06/2024    OXY Negative 02/06/2024    PCP Negative 02/06/2024    THC Negative 02/06/2024    TEMP 92 F 02/06/2024    SGPOCT 1.005 02/06/2024       *POC urine drug screen does not screen for Fentanyl        2/6/2024     2:00 PM   PHQ Assesment Total Score(s)   PHQ-9 Score 0       If PHQ-9 score of 15 or higher, has Recovery Clinic therapist or provider been notified? N/A    Any current suicidal ideation? No  If yes, has Recovery Clinic therapist or provider been notified? N/A    Primary care provider: Physician No Ref-Primary     Mental health provider: none (follow up on MH referral if needed)    Insurance needs: active    Housing needs: stable     Current legal issues: none    Contact information up to date? Yes     3rd Party Involvement  (please obtain ELBA if pt would like to include)    Pablo Rowland MA  February 6, 2024  2:23 PM

## 2024-02-06 NOTE — PROGRESS NOTES
[facility] Follow Up    ASSESSMENT/PLAN                                                      1. Opioid use disorder, severe, dependence (H)  Sustained remission. Continue Suboxone 3 mg daily.   - interested in tapering dose, plan to discuss further at follow up. Reviewed importance of taking medication as prescribed and not abruptly stopping buprenorphine.   - confirms access to narcan   - Drugs of Abuse Screen Urine (POC CUPS) POCT; Standing  - buprenorphine HCl-naloxone HCl (SUBOXONE) 2-0.5 MG per film; Place 1.5 Film under the tongue daily  Dispense: 45 Film; Refill: 0    2. Therapeutic opioid-induced constipation (OIC)  Controlled. Continue Amitiza unchanged.   - lubiprostone (AMITIZA) 24 MCG capsule; Take 1 capsule (24 mcg) by mouth 2 times daily (with meals)  Dispense: 60 capsule; Refill: 1    3. Nicotine use disorder  - encouraged efforts with reduction in vape use, NRT as below.   - nicotine polacrilex (NICORETTE) 4 MG gum; Place 1 each (4 mg) inside cheek every hour as needed for nicotine withdrawal symptoms  Dispense: 240 each; Refill: 3       Return in about 4 weeks (around 3/5/2024) for Follow up, with any available provider, in person.      Patient counseling completed today:    Discussed mechanism of action, potential risks/benefits/side effects of medications and other recommendations above.    Harm reduction counseling including availability of naloxone, risks associated with concurrent use of opioids and benzodiazepines, alcohol, or other sedatives, safer administration as applicable.  Discussed importance of avoiding isolation, building a network of supportive relationships, avoiding people/places/things associated with past use to reduce risk of relapse; including motivational interviewing regarding psychosocial interventions.     SUBJECTIVE                                                          CC/HPI:  Reji Goldstein is a 23 year old male with PMH nicotine use disorder  and opioid use disorder on buprenorphine who presents to the Recovery Clinic for return visit.       Brief History:  Reji Goldstein was first seen in Recovery Clinic on 05/15/23. They were referred by staff from [facility] ED after pt was seen there in 3/2023 requesting rx for buprenorphine.   Patient's reasons for seeking treatment on this date included wanting to continue taper off buprenorphine.     Pt started buprenorphine for treatment of OUD through [facility] in 12/2021.  Max dose 16mg/day.  He began tapering dose on his own in Fall, 2022.  He has continued to decrease dose since that time.  He denies return to use of fentanyl since 12/2021.       Substance Use History :  Opioids:   Age at first use: 19  Current use: substance: Perc 30's ; quantity stated never counted but 10 pills would be the max he would use daily ; route: inhalation  ; timing of last use: 12/2021;                 IV drug use: No   History of overdose: Yes: one episode in a vehicle with friends 7/2020 x2  Previous residential or outpatient treatments for addiction : Yes: kerrie in 2021 in patient   Previous medication treatments for addiction: Yes: Currently is taking Suboxone   Longest period of sobriety: 12/2021 to present  Medical complications related to substance use: denies   Hepatitis C: no record of testing  HIV: 8/14/2019 HIV ag/ab nonreactive     Other Addiction History:  Stimulants   Denies   Sedatives/hypnotics/anxiolytics:   Denies   Alcohol:   Denies   Nicotine:   H/o vaping  Cannabis:   Denies   Hallucinogens/Dissociatives:   Denies   Eating disorder:  Denies   Gambling:              Denies       PAST PSYCHIATRIC HISTORY:  Diagnoses- denies   Suicide Attempts: No   Hospitalizations: No      Social History  Housing status: with family father and 8 siblings  Employment status: working at [employer] part-time  Relationship status: Single  Children: no children  Legal: denies           12/8/2023     1:00 PM 1/8/2024    10:00 AM 2/6/2024      "2:00 PM   PHQ   PHQ-9 Total Score 0 0 0   Q9: Thoughts of better off dead/self-harm past 2 weeks Not at all Not at all Not at all       Most recent Recovery Clinic visit 1/8/24.    A/P last visit:  1. Opioid use disorder, severe, dependence (H)  Stable.  Continue Suboxone, no change.   - Drugs of Abuse Screen Urine (POC CUPS) POCT  - buprenorphine HCl-naloxone HCl (SUBOXONE) 2-0.5 MG per film; Place 1.5 Film under the tongue daily  Dispense: 45 Film; Refill: 0     2. Therapeutic opioid-induced constipation (OIC)  Improved.  Continue Amitiza.    02/06/24 visit:  - Doing well with Suboxone. Taking 3 mg daily as a single dose. Usually around noon. Rinsing mouth after dissolving film and waiting 1 hr to brush teeth.   - No recovery meetings   - has strong family and friend support   - working part time for [employer]  - buying vape pens, \"900\" hit. Has tried gum in the past, this has been effective in helping him not vape for 2-3 hours. Some interest in quitting   - interested in taper of Suboxone next month   - constipation controlled.     Labs discussed with patient?  Yes      [place] Prescription Drug Monitoring Program Reviewed:  Yes    01/08/2024 01/08/2024 2 Suboxone 2 Mg-0.5 Mg Sl Film 39.00 26     Medications:  naloxone (NARCAN) 4 MG/0.1ML nasal spray, Spray 1 spray (4 mg) into one nostril alternating nostrils as needed for opioid reversal every 2-3 minutes until assistance arrives  polyethylene glycol (MIRALAX) 17 GM/Dose powder, Take 1 Capful by mouth daily (Patient not taking: Reported on 1/8/2024)  SENNA-docusate sodium (SENNA S) 8.6-50 MG tablet, Take 1 tablet by mouth daily as needed (constipation) (Patient not taking: Reported on 12/8/2023)    No current facility-administered medications on file prior to visit.      No Known Allergies    PMH, PSH, FamHx reviewed      OBJECTIVE                                                      /78   Pulse 88     Physical Exam  Constitutional:       General: He is " not in acute distress.     Appearance: Normal appearance.   Eyes:      Extraocular Movements: Extraocular movements intact.   Pulmonary:      Effort: Pulmonary effort is normal.   Neurological:      Mental Status: He is alert and oriented to person, place, and time.   Psychiatric:         Mood and Affect: Mood normal.         Behavior: Behavior normal.         Thought Content: Thought content normal.         Judgment: Judgment normal.         Labs:    UDS:  POC UDS 2/6/24 POSITIVE for BUPRENORPHINE only     Lab Results   Component Value Date    BUP Screen Positive (A) 01/08/2024    BZO Negative 01/08/2024    BAR Negative 01/08/2024    RAMON Negative 01/08/2024    MAMP Negative 01/08/2024    AMP Negative 01/08/2024    MDMA Negative 01/08/2024    MTD Negative 01/08/2024    [id number] Negative 01/08/2024    OXY Negative 01/08/2024    PCP Negative 01/08/2024    THC Negative 01/08/2024    TEMP 90 F 01/08/2024    SGPOCT 1.025 01/08/2024     *POC urine drug screen does not screen for Fentanyl    No results found for this or any previous visit (from the past 240 hour(s)).    FABRIZIO Bhatt CNP  [facility]  [address] [place] [id number] [phone]

## 2024-03-06 ENCOUNTER — OFFICE VISIT (OUTPATIENT)
Dept: BEHAVIORAL HEALTH | Facility: CLINIC | Age: 24
End: 2024-03-06
Payer: COMMERCIAL

## 2024-03-06 VITALS — DIASTOLIC BLOOD PRESSURE: 72 MMHG | HEART RATE: 81 BPM | SYSTOLIC BLOOD PRESSURE: 114 MMHG

## 2024-03-06 DIAGNOSIS — F17.200 NICOTINE USE DISORDER: ICD-10-CM

## 2024-03-06 DIAGNOSIS — Z79.891 ENCOUNTER FOR MONITORING OPIOID MAINTENANCE THERAPY: ICD-10-CM

## 2024-03-06 DIAGNOSIS — F11.20 OPIOID USE DISORDER, SEVERE, DEPENDENCE (H): Primary | ICD-10-CM

## 2024-03-06 DIAGNOSIS — Z51.81 ENCOUNTER FOR MONITORING OPIOID MAINTENANCE THERAPY: ICD-10-CM

## 2024-03-06 LAB
AMPHETAMINE QUAL URINE POCT: NEGATIVE
BARBITURATE QUAL URINE POCT: NEGATIVE
BENZODIAZEPINE QUAL URINE POCT: NEGATIVE
BUPRENORPHINE QUAL URINE POCT: ABNORMAL
COCAINE QUAL URINE POCT: NEGATIVE
CREAT UR-MCNC: 83 MG/DL
CREATININE QUAL URINE POCT: ABNORMAL
INTERNAL QC QUAL URINE POCT: ABNORMAL
MDMA QUAL URINE POCT: NEGATIVE
METHADONE QUAL URINE POCT: NEGATIVE
METHAMPHETAMINE QUAL URINE POCT: NEGATIVE
OPIATE QUAL URINE POCT: NEGATIVE
OXYCODONE QUAL URINE POCT: NEGATIVE
PH QUAL URINE POCT: ABNORMAL
PHENCYCLIDINE QUAL URINE POCT: NEGATIVE
POCT KIT EXPIRATION DATE: ABNORMAL
POCT KIT LOT NUMBER: ABNORMAL
SPECIFIC GRAVITY POCT: 1.02
TEMPERATURE URINE POCT: ABNORMAL
THC QUAL URINE POCT: NEGATIVE

## 2024-03-06 PROCEDURE — G0480 DRUG TEST DEF 1-7 CLASSES: HCPCS | Performed by: FAMILY MEDICINE

## 2024-03-06 PROCEDURE — 80305 DRUG TEST PRSMV DIR OPT OBS: CPT | Performed by: FAMILY MEDICINE

## 2024-03-06 PROCEDURE — 99214 OFFICE O/P EST MOD 30 MIN: CPT | Performed by: FAMILY MEDICINE

## 2024-03-06 RX ORDER — BUPRENORPHINE AND NALOXONE 2; .5 MG/1; MG/1
1 FILM, SOLUBLE BUCCAL; SUBLINGUAL DAILY
Qty: 30 FILM | Refills: 0 | Status: SHIPPED | OUTPATIENT
Start: 2024-03-06 | End: 2024-04-01

## 2024-03-06 ASSESSMENT — PATIENT HEALTH QUESTIONNAIRE - PHQ9: SUM OF ALL RESPONSES TO PHQ QUESTIONS 1-9: 0

## 2024-03-06 NOTE — NURSING NOTE
[facility]      Rooming information:  Approximate last use of full opioid agonist: 2021  Taking buprenorphine? Yes:   How much per day? 2mg  Number of buprenorphine films/tablets remaining currently: 5  Side effects related to buprenorphine (constipation, dry mouth, sedation?) No   Narcan currently available: Yes  Other recent substance use:    Denies  NICOTINE-Yes:   If using nicotine, ready to quit? Yes: tying    Point of care urine drug screen positive for:  Lab Results   Component Value Date    BUP Screen Positive (A) 03/06/2024    BZO Negative 03/06/2024    BAR Negative 03/06/2024    RAMON Negative 03/06/2024    MAMP Negative 03/06/2024    AMP Negative 03/06/2024    MDMA Negative 03/06/2024    MTD Negative 03/06/2024    [id number] Negative 03/06/2024    OXY Negative 03/06/2024    PCP Negative 03/06/2024    THC Negative 03/06/2024    TEMP 90 F 03/06/2024    SGPOCT 1.025 03/06/2024       *POC urine drug screen does not screen for Fentanyl          3/6/2024     2:00 PM   PHQ Assesment Total Score(s)   PHQ-9 Score 0       If PHQ-9 score of 15 or higher, has Recovery Clinic therapist or provider been notified? No    Any current suicidal ideation? No  If yes, has [facility] therapist or provider been notified? No    Primary care provider: Physician No Ref-Primary     Mental health provider: none (follow up on MH referral if needed)    Insurance needs: active    Housing needs: stable    Current legal issues: none    Contact information up to date? yes    3rd Party Involvement  not today (please obtain ELBA if pt would like to include)    Jyoti Mcdonough MA  March 6, 2024  2:16 PM

## 2024-03-06 NOTE — PROGRESS NOTES
[facility] Follow Up    ASSESSMENT/PLAN                                                    1. Opioid use disorder, severe, dependence (H)  Remains in sustained remission.  Pt wishes to proceed with elective taper off buprenorphine.  Has been taking 2mg buprenorphine/day for the past 2 weeks.   Decrease buprenorphine to 2mg alternating with 1mg.  Return to 2mg daily if symptoms return.    Pt declined medications for withdrawal symptoms today, he expressed understanding he can contact clinic if he changes his mind about this.   Pt is aware tapering is not required and process can be reversed at any time.    - Drugs of Abuse Screen Urine (POC CUPS) POCT  - Drug Confirmation Panel Urine with Creat - lab collect; Future  - buprenorphine HCl-naloxone HCl (SUBOXONE) 2-0.5 MG per film; Place 1 Film under the tongue daily  Dispense: 30 Film; Refill: 0  - Drug Confirmation Panel Urine with Creat - lab collect    2. Encounter for monitoring opioid maintenance therapy  - Drug Confirmation Panel Urine with Creat - lab collect; Future  - Drug Confirmation Panel Urine with Creat - lab collect    3. Nicotine use disorder  Encouraged NRT use and efforts to quit    Return in about 4 weeks (around 4/3/2024).      Patient counseling completed today:    Discussed mechanism of action, potential risks/benefits/side effects of medications and other recommendations above.    Harm reduction counseling including availability of naloxone, risks associated with concurrent use of opioids and benzodiazepines, alcohol, or other sedatives, safer administration as applicable.  Discussed importance of avoiding isolation, building a network of supportive relationships, avoiding people/places/things associated with past use to reduce risk of relapse; including motivational interviewing regarding psychosocial interventions.     SUBJECTIVE                                                          CC/HPI:  Reji Goldstein is a 23  year old male with PMH nicotine use disorder and opioid use disorder on buprenorphine who presents to the Recovery Clinic for return visit.       Brief History:  Reji Goldstein was first seen in Recovery Clinic on 05/15/23. They were referred by staff from [facility] ED after pt was seen there in 3/2023 requesting rx for buprenorphine.   Patient's reasons for seeking treatment on this date included wanting to continue taper off buprenorphine.     Pt started buprenorphine for treatment of OUD through [facility] in 2021.  Max dose 16mg/day.  He began tapering dose on his own in 2022.  He has continued to decrease dose since that time.  He denies return to use of fentanyl since 2021.       Substance Use History :  Opioids:   Age at first use: 19  Current use: substance: Perc 30's ; quantity stated never counted but 10 pills would be the max he would use daily ; route: inhalation  ; timing of last use: 2021;                 IV drug use: No   History of overdose: Yes: one episode in a vehicle with friends 7/2020 x2  Previous residential or outpatient treatments for addiction : Yes: [facility]  in patient   Previous medication treatments for addiction: Yes: Currently is taking Suboxone   Longest period of sobriety: 2021 to present  Medical complications related to substance use: denies   Hepatitis C: no record of testing  HIV: 2019 HIV ag/ab nonreactive     Other Addiction History:  Stimulants   Denies   Sedatives/hypnotics/anxiolytics:   Denies   Alcohol:   Denies   Nicotine:   H/o vaping  Cannabis:   Denies   Hallucinogens/Dissociatives:   Denies   Eating disorder:  Denies   Gambling:              Denies       PAST PSYCHIATRIC HISTORY:  Diagnoses- denies   Suicide Attempts: No   Hospitalizations: No      Social History  Housing status: with family father and 8 siblings (pt's mother  in MVC 2019, hit by impaired )  Employment status: part time [employer]  Relationship status: Single  Children: no  children  Legal: jones           1/8/2024    10:00 AM 2/6/2024     2:00 PM 3/6/2024     2:00 PM   PHQ   PHQ-9 Total Score 0 0 0   Q9: Thoughts of better off dead/self-harm past 2 weeks Not at all Not at all Not at all       Most recent Recovery Clinic visit 2/6/24.    A/P last visit:  1. Opioid use disorder, severe, dependence (H)  Sustained remission. Continue Suboxone 3 mg daily.   - interested in tapering dose, plan to discuss further at follow up. Reviewed importance of taking medication as prescribed and not abruptly stopping buprenorphine.   - confirms access to narcan   - Drugs of Abuse Screen Urine (POC CUPS) POCT; Standing  - buprenorphine HCl-naloxone HCl (SUBOXONE) 2-0.5 MG per film; Place 1.5 Film under the tongue daily  Dispense: 45 Film; Refill: 0     2. Therapeutic opioid-induced constipation (OIC)  Controlled. Continue Amitiza unchanged.   - lubiprostone (AMITIZA) 24 MCG capsule; Take 1 capsule (24 mcg) by mouth 2 times daily (with meals)  Dispense: 60 capsule; Refill: 1     3. Nicotine use disorder  - encouraged efforts with reduction in vape use, NRT as below.   - nicotine polacrilex (NICORETTE) 4 MG gum; Place 1 each (4 mg) inside cheek every hour as needed for nicotine withdrawal symptoms  Dispense: 240 each; Refill: 3                   Return in about 4 weeks (around 3/5/2024) for Follow up, with any available provider, in person.    3/6/24 visit:  Pt returns for monthly follow up as recommended.  He reports he did decrease buprenorphine from 3mg/day to 2mg/day about 2 weeks ago.  He has not noticed any withdrawal symptoms or return of cravings.  Reports his sleep has been good, moods are stable.    Has used nicotine gum occasionally.  Still vaping but trying to quit.   Working Thursday-Sunday.  When not at work he goes to the gym, helps his father with errands.    States he has made new friends, does not spend time with people associated with past use.     Labs discussed with  patient?  Yes      [place] Prescription Drug Monitoring Program Reviewed:  Yes  02/06/2024 02/06/2024 2 Suboxone 2 Mg-0.5 Mg Sl Film 45.00 30 Cl Max [id number] Romel ([id number])     Medications:  buprenorphine HCl-naloxone HCl (SUBOXONE) 2-0.5 MG per film, Place 1.5 Film under the tongue daily  lubiprostone (AMITIZA) 24 MCG capsule, Take 1 capsule (24 mcg) by mouth 2 times daily (with meals)  naloxone (NARCAN) 4 MG/0.1ML nasal spray, Spray 1 spray (4 mg) into one nostril alternating nostrils as needed for opioid reversal every 2-3 minutes until assistance arrives  nicotine polacrilex (NICORETTE) 4 MG gum, Place 1 each (4 mg) inside cheek every hour as needed for nicotine withdrawal symptoms  polyethylene glycol (MIRALAX) 17 GM/Dose powder, Take 1 Capful by mouth daily (Patient not taking: Reported on 1/8/2024)  SENNA-docusate sodium (SENNA S) 8.6-50 MG tablet, Take 1 tablet by mouth daily as needed (constipation) (Patient not taking: Reported on 12/8/2023)    No current facility-administered medications on file prior to visit.      No Known Allergies    PMH, PSH, FamHx reviewed      OBJECTIVE                                                      /72   Pulse 81     Physical Exam  Constitutional:       General: He is not in acute distress.     Appearance: Normal appearance.   Eyes:      Extraocular Movements: Extraocular movements intact.   Pulmonary:      Effort: Pulmonary effort is normal.   Neurological:      Mental Status: He is alert and oriented to person, place, and time.   Psychiatric:         Attention and Perception: Attention and perception normal.         Mood and Affect: Mood normal. Affect is not inappropriate.         Speech: Speech normal.         Behavior: Behavior normal.         Thought Content: Thought content normal.         Judgment: Judgment normal.         Labs:    UDS:     Lab Results   Component Value Date    BUP Screen Positive (A) 03/06/2024    BZO Negative 03/06/2024    BAR Negative  03/06/2024    RAMON Negative 03/06/2024    MAMP Negative 03/06/2024    AMP Negative 03/06/2024    MDMA Negative 03/06/2024    MTD Negative 03/06/2024    [id number] Negative 03/06/2024    OXY Negative 03/06/2024    PCP Negative 03/06/2024    THC Negative 03/06/2024    TEMP 90 F 03/06/2024    SGPOCT 1.025 03/06/2024     *POC urine drug screen does not screen for Fentanyl    Recent Results (from the past 240 hour(s))   Drugs of Abuse Screen Urine (POC CUPS) POCT    Collection Time: 03/06/24  2:23 PM   Result Value Ref Range    POCT Kit Lot Number [id number]     POCT Kit Expiration Date 52005771     Temperature Urine POCT 90 F 90 F, 92 F, 94 F, 96 F, 98 F, 100 F    Specific [place] POCT 1.025 1.005, 1.015, 1.025    pH Qual Urine POCT 7 pH 4 pH, 5 pH, 7 pH, 9 pH    Creatinine Qual Urine POCT 100 mg/dL 20 mg/dL, 50 mg/dL, 100 mg/dL, 200 mg/dL    Internal QC Qual Urine POCT Valid Valid    Amphetamine Qual Urine POCT Negative Negative    Barbiturate Qual Urine POCT Negative Negative    Buprenorphine Qual Urine POCT Screen Positive (A) Negative    Benzodiazepine Qual Urine POCT Negative Negative    Cocaine Qual Urine POCT Negative Negative    Methamphetamine Qual Urine POCT Negative Negative    MDMA Qual Urine POCT Negative Negative    Methadone Qual Urine POCT Negative Negative    Opiate Qual Urine POCT Negative Negative    Oxycodone Qual Urine POCT Negative Negative    Phencyclidine Qual Urine POCT Negative Negative    THC Qual Urine POCT Negative Negative       Erica Padron MD  Addiction Medicine  [place] [id number] [address] [id number] [phone]

## 2024-03-08 LAB
BUPRENORPHINE UR CFM-MCNC: 34 NG/ML
BUPRENORPHINE/CREAT UR: 41 NG/MG {CREAT}
NALOXONE UR CFM-MCNC: 212 NG/ML
NALOXONE: 255 NG/MG {CREAT}
NORBUPRENORPHINE UR CFM-MCNC: 75 NG/ML
NORBUPRENORPHINE/CREAT UR: 90 NG/MG {CREAT}

## 2024-04-01 ENCOUNTER — OFFICE VISIT (OUTPATIENT)
Dept: BEHAVIORAL HEALTH | Facility: CLINIC | Age: 24
End: 2024-04-01
Payer: COMMERCIAL

## 2024-04-01 VITALS — HEART RATE: 78 BPM | SYSTOLIC BLOOD PRESSURE: 108 MMHG | OXYGEN SATURATION: 97 % | DIASTOLIC BLOOD PRESSURE: 75 MMHG

## 2024-04-01 DIAGNOSIS — Z51.81 ENCOUNTER FOR MONITORING OPIOID MAINTENANCE THERAPY: ICD-10-CM

## 2024-04-01 DIAGNOSIS — K59.03 THERAPEUTIC OPIOID-INDUCED CONSTIPATION (OIC): ICD-10-CM

## 2024-04-01 DIAGNOSIS — F17.200 NICOTINE USE DISORDER: ICD-10-CM

## 2024-04-01 DIAGNOSIS — Z79.891 ENCOUNTER FOR MONITORING OPIOID MAINTENANCE THERAPY: ICD-10-CM

## 2024-04-01 DIAGNOSIS — F11.20 OPIOID USE DISORDER, SEVERE, DEPENDENCE (H): Primary | ICD-10-CM

## 2024-04-01 DIAGNOSIS — T40.2X5A THERAPEUTIC OPIOID-INDUCED CONSTIPATION (OIC): ICD-10-CM

## 2024-04-01 DIAGNOSIS — F11.20 OPIOID USE DISORDER, SEVERE, DEPENDENCE (H): ICD-10-CM

## 2024-04-01 LAB
AMPHETAMINE QUAL URINE POCT: NEGATIVE
BARBITURATE QUAL URINE POCT: NEGATIVE
BENZODIAZEPINE QUAL URINE POCT: NEGATIVE
BUPRENORPHINE QUAL URINE POCT: ABNORMAL
COCAINE QUAL URINE POCT: NEGATIVE
CREATININE QUAL URINE POCT: ABNORMAL
FENTANYL UR QL: NORMAL
INTERNAL QC QUAL URINE POCT: ABNORMAL
MDMA QUAL URINE POCT: NEGATIVE
METHADONE QUAL URINE POCT: NEGATIVE
METHAMPHETAMINE QUAL URINE POCT: NEGATIVE
OPIATE QUAL URINE POCT: NEGATIVE
OXYCODONE QUAL URINE POCT: NEGATIVE
PH QUAL URINE POCT: ABNORMAL
PHENCYCLIDINE QUAL URINE POCT: NEGATIVE
POCT KIT EXPIRATION DATE: ABNORMAL
POCT KIT LOT NUMBER: ABNORMAL
SPECIFIC GRAVITY POCT: 1
TEMPERATURE URINE POCT: ABNORMAL
THC QUAL URINE POCT: NEGATIVE

## 2024-04-01 PROCEDURE — 2894A DRUGS OF ABUSE SCREEN URINE (POC CUPS) POCT: CPT | Performed by: FAMILY MEDICINE

## 2024-04-01 PROCEDURE — 80307 DRUG TEST PRSMV CHEM ANLYZR: CPT | Performed by: FAMILY MEDICINE

## 2024-04-01 PROCEDURE — 99214 OFFICE O/P EST MOD 30 MIN: CPT | Mod: GC | Performed by: FAMILY MEDICINE

## 2024-04-01 RX ORDER — BUPRENORPHINE AND NALOXONE 2; .5 MG/1; MG/1
1 FILM, SOLUBLE BUCCAL; SUBLINGUAL DAILY
Qty: 30 FILM | Refills: 0 | Status: SHIPPED | OUTPATIENT
Start: 2024-04-01 | End: 2024-04-22

## 2024-04-01 RX ORDER — LUBIPROSTONE 24 UG/1
24 CAPSULE ORAL 2 TIMES DAILY WITH MEALS
Qty: 60 CAPSULE | Refills: 1 | Status: SHIPPED | OUTPATIENT
Start: 2024-04-01 | End: 2024-09-09

## 2024-04-01 RX ORDER — VARENICLINE TARTRATE 0.5 (11)-1
KIT ORAL
Qty: 53 TABLET | Refills: 0 | Status: SHIPPED | OUTPATIENT
Start: 2024-04-01

## 2024-04-01 ASSESSMENT — PATIENT HEALTH QUESTIONNAIRE - PHQ9: SUM OF ALL RESPONSES TO PHQ QUESTIONS 1-9: 0

## 2024-04-01 NOTE — PROGRESS NOTES
[facility] Follow Up    ASSESSMENT/PLAN                                                      Opioid use disorder, severe, dependence (H)  Encounter for monitoring opioid maintenance therapy  Remains in sustained remission.  Patient in the past has been doing an elective taper off buprenorphine. Today he continues to be stable at his dose and wishes to proceed at current dose for now. No cravings or withdrawal symptoms. I agree with this plan - I re-iterated to the patient there is no need to completely come off if he is doing well at his current dose. Continue buprenorphine-naloxone 2-0.5 mg SL daily.  Pt is aware tapering is not required and process can be reversed at any time.  I discussed with patient screening for Hepatitis C and HIV and he consented to both tests. I also offered additional STI screening which he declined.   -     Drugs of Abuse Screen Urine (POC CUPS) POCT  -     buprenorphine HCl-naloxone HCl (SUBOXONE) 2-0.5 MG per film; Place 1 Film under the tongue daily  -     Fentanyl Qualitative with Reflex to Quant Urine; Future  -     Hepatitis C antibody; Future  -     HIV Antigen Antibody Combo; Future    Nicotine use disorder  He previously was using nicotine gum occasionally. He stopped it. He wants to quit vaping but isn't sure he can. We discussed medication options to stop nicotine and he was interested in varenicline. We discussed potential side effects like increased in vivid dreams and potential changes in mood. Patient to call clinic if any adverse effects and can stop medication. We also discussed being more mindful of his vaping habits and recording his vaping use as a first step in cutting back.   -     varenicline (CHANTIX FRANSISCA) 0.5 MG X 11 & 1 MG X 42 tablet; Take 0.5 mg tab daily for 3 days, THEN 0.5 mg tab twice daily for 4 days, THEN 1 mg twice daily.    Therapeutic opioid-induced constipation (OIC)  Patient's constipation much improved on the following  "medication. Has tried other options like senna and miralax that have not worked for his opioid induced constipation.   -     lubiprostone (AMITIZA) 24 MCG capsule; Take 1 capsule (24 mcg) by mouth 2 times daily (with meals)    Return in about 4 weeks (around 4/29/2024) for Follow up.     Patient counseling completed today:  Discussed mechanism of action, potential risks/benefits/side effects of medications and other recommendations above.    Harm reduction counseling including availability of naloxone, risks associated with concurrent use of opioids and benzodiazepines, alcohol, or other sedatives, safer administration as applicable.  Discussed importance of avoiding isolation, building a network of supportive relationships, avoiding people/places/things associated with past use to reduce risk of relapse; including motivational interviewing regarding psychosocial interventions.     SUBJECTIVE                                                        CC/HPI:  Reji Goldstein is a 23 year old male with PMH nicotine use disorder and opioid use disorder on buprenorphine who presents to the Recovery [facility] for return visit.    Brief History:  Reji Goldstein was first seen in Recovery Clinic on 05/15/23. They were referred by staff from [facility] ED after pt was seen there in 3/2023 requesting rx for buprenorphine.   Patient's reasons for seeking treatment on this date included wanting to continue taper off buprenorphine.     Pt started buprenorphine for treatment of OUD through [facility] in 12/2021.  Max dose 16mg/day.  He began tapering dose on his own in Fall, 2022.  He has continued to decrease dose since that time.  He denies return to use of fentanyl since 12/2021.      4/1/2024 visit:  He is taking the one film a day buprenorphine-naloxone 2-0.5 mg a day. He is feeling \"pretty fine.\" He denies cravings for opioids. No return to use. Last use was December 2021. He is feeling good about sobriety. He got rid of friends he used to " use around. He had to choose being sober over old friends. His sleep is good. He works for [employer], in [employer]. He works at night. So he his sleepy today. No withdrawal symptoms. He is still vaping. He vapes one cartridge 6 days. He wants to quit but feels he cannot. He has used the gum in the past not anymore. He puffs on the vape all day. He talked about the mindless connection between vaping and how quick it is to just use it and not know. Not feeling too constipated, way better than before. He is taking amitza. He is not taking senna or miralax. He does have narcan.  His mood is good. He likes to make t-shirt designs.        Substance Use History :  Opioids:   Age at first use: 19  Current use: substance: Perc 30's ; quantity stated never counted but 10 pills would be the max he would use daily ; route: inhalation  ; timing of last use: 2021;                 IV drug use: No   History of overdose: Yes: one episode in a vehicle with friends 7/2020 x2  Previous residential or outpatient treatments for addiction : Yes: [facility]  in patient   Previous medication treatments for addiction: Yes: Currently is taking Suboxone   Longest period of sobriety: 2021 to present  Medical complications related to substance use: denies   Hepatitis C: no record of testing  HIV: 2019 HIV ag/ab nonreactive     Other Addiction History:  Stimulants   Denies   Sedatives/hypnotics/anxiolytics:   Denies   Alcohol:   Denies   Nicotine:   H/o vaping  Cannabis:   Denies   Hallucinogens/Dissociatives:   Denies   Eating disorder:  Denies   Gambling:              Denies       PAST PSYCHIATRIC HISTORY:  Diagnoses- denies   Suicide Attempts: No   Hospitalizations: No      Social History  Housing status: with family father and 8 siblings (pt's mother  in MVC 2019, hit by impaired )  Employment status: part time [employer], warehouse, work's nights   Relationship status: Single  Children: no children  Legal: denies          2/6/2024     2:00 PM 3/6/2024     2:00 PM 4/1/2024    12:00 PM   PHQ   PHQ-9 Total Score 0 0 0   Q9: Thoughts of better off dead/self-harm past 2 weeks Not at all Not at all Not at all       3/6/24 visit PREVIOUS VISIT:  Pt returns for monthly follow up as recommended.  He reports he did decrease buprenorphine from 3mg/day to 2mg/day about 2 weeks ago.  He has not noticed any withdrawal symptoms or return of cravings.  Reports his sleep has been good, moods are stable.    Has used nicotine gum occasionally.  Still vaping but trying to quit.   Working Thursday-Sunday.  When not at work he goes to the gym, helps his father with errands.    States he has made new friends, does not spend time with people associated with past use.     Labs discussed with patient?  Yes    [place] Prescription Drug Monitoring Program Reviewed:  Yes  03/06/2024 03/06/2024 2 Suboxone 2 Mg-0.5 Mg Sl Film 30.00 30 Li Vol [id number] Romel (1359) 0/0 2.00 mg Medicaid [place]   02/06/2024 02/06/2024 2 Suboxone 2 Mg-0.5 Mg Sl Film 45.00 30 Cl Max [id number] Romel (1359) 0/0 3.00 mg Medicaid [place]       Medications:  naloxone (NARCAN) 4 MG/0.1ML nasal spray, Spray 1 spray (4 mg) into one nostril alternating nostrils as needed for opioid reversal every 2-3 minutes until assistance arrives (Patient not taking: Reported on 4/1/2024)    No current facility-administered medications on file prior to visit.      No Known Allergies    PMH, PSH, FamHx reviewed      OBJECTIVE                                                      /75 (BP Location: Left arm, Patient Position: Sitting)   Pulse 78   SpO2 97%     Physical Exam  Constitutional:       General: He is not in acute distress.     Appearance: Normal appearance.   HENT:      Head: Normocephalic and atraumatic.   Eyes:      Extraocular Movements: Extraocular movements intact.   Pulmonary:      Effort: Pulmonary effort is normal.   Neurological:      Mental Status: He is alert and oriented to person, place, and  "time.   Psychiatric:         Attention and Perception: Attention and perception normal.         Mood and Affect: Affect is not inappropriate.         Speech: Speech normal.         Behavior: Behavior normal.         Thought Content: Thought content normal.         Judgment: Judgment normal.      Comments: Mood, \"Good\"       Labs:    UDS:     Lab Results   Component Value Date    BUP Screen Positive (A) 04/01/2024    BZO Negative 04/01/2024    BAR Negative 04/01/2024    RAMON Negative 04/01/2024    MAMP Negative 04/01/2024    AMP Negative 04/01/2024    MDMA Negative 04/01/2024    MTD Negative 04/01/2024    [id number] Negative 04/01/2024    OXY Negative 04/01/2024    PCP Negative 04/01/2024    THC Negative 04/01/2024    TEMP Invalid (A) 04/01/2024    SGPOCT 1.005 04/01/2024     *POC urine drug screen does not screen for Fentanyl    Recent Results (from the past 240 hour(s))   Drugs of Abuse Screen Urine (POC CUPS) POCT    Collection Time: 04/01/24 12:51 PM   Result Value Ref Range    POCT Kit Lot Number [id number]     POCT Kit Expiration Date 10/23/2025     Temperature Urine POCT Invalid (A) 90 F, 92 F, 94 F, 96 F, 98 F, 100 F    Specific [place] POCT 1.005 1.005, 1.015, 1.025    pH Qual Urine POCT 7 pH 4 pH, 5 pH, 7 pH, 9 pH    Creatinine Qual Urine POCT 20 mg/dL 20 mg/dL, 50 mg/dL, 100 mg/dL, 200 mg/dL    Internal QC Qual Urine POCT Valid Valid    Amphetamine Qual Urine POCT Negative Negative    Barbiturate Qual Urine POCT Negative Negative    Buprenorphine Qual Urine POCT Screen Positive (A) Negative    Benzodiazepine Qual Urine POCT Negative Negative    Cocaine Qual Urine POCT Negative Negative    Methamphetamine Qual Urine POCT Negative Negative    MDMA Qual Urine POCT Negative Negative    Methadone Qual Urine POCT Negative Negative    Opiate Qual Urine POCT Negative Negative    Oxycodone Qual Urine POCT Negative Negative    Phencyclidine Qual Urine POCT Negative Negative    THC Qual Urine POCT Negative Negative "       Elsa Guerrero DO  Addiction Medicine Fellow   Pronouns: She/Hers    I saw the patient with the fellow and participated in key portions of the service including the mental status examination and developing the plan of care. I reviewed key portions of the history with the fellow. I agree with the findings and plan as documented in this note.     Erica Padron MD  Addiction Medicine  [place] [id number] [address] [id number] [phone]

## 2024-04-01 NOTE — NURSING NOTE
[facility]      Rooming information:  Approximate last use of full opioid agonist: 12/2021  Taking buprenorphine? Yes:   How much per day? 2-0.5 mg  Number of buprenorphine films/tablets remaining currently: 1  Side effects related to buprenorphine (constipation, dry mouth, sedation?) Yes: Constipation which has improved  Narcan currently available: Yes  Other recent substance use:    Denies  NICOTINE-Yes: Vape  If using nicotine, ready to quit? Yes: Has been trying    Point of care urine drug screen positive for:  Lab Results   Component Value Date    BUP Screen Positive (A) 04/01/2024    BZO Negative 04/01/2024    BAR Negative 04/01/2024    RAMON Negative 04/01/2024    MAMP Negative 04/01/2024    AMP Negative 04/01/2024    MDMA Negative 04/01/2024    MTD Negative 04/01/2024    [id number] Negative 04/01/2024    OXY Negative 04/01/2024    PCP Negative 04/01/2024    THC Negative 04/01/2024    TEMP Invalid (A) 04/01/2024    SGPOCT 1.005 04/01/2024       *POC urine drug screen does not screen for Fentanyl          4/1/2024    12:00 PM   PHQ Assesment Total Score(s)   PHQ-9 Score 0       If PHQ-9 score of 15 or higher, has Recovery Clinic therapist or provider been notified? N/A    Any current suicidal ideation? No  If yes, has Recovery [facility] therapist or provider been notified? N/A    Primary care provider: [facility]-unsure of name  Mental health provider: Denies (follow up on MH referral if needed)    Insurance needs: Active    Housing needs: Stable    Current legal issues: Denies    Contact information up to date? Yes    3rd Party Involvement None today (please obtain ELBA if pt would like to include)    Jessica Belcher RN  April 1, 2024  12:53 PM

## 2024-04-22 ENCOUNTER — OFFICE VISIT (OUTPATIENT)
Dept: BEHAVIORAL HEALTH | Facility: CLINIC | Age: 24
End: 2024-04-22
Payer: COMMERCIAL

## 2024-04-22 VITALS — HEART RATE: 80 BPM | SYSTOLIC BLOOD PRESSURE: 112 MMHG | DIASTOLIC BLOOD PRESSURE: 74 MMHG

## 2024-04-22 DIAGNOSIS — F17.200 NICOTINE USE DISORDER: ICD-10-CM

## 2024-04-22 DIAGNOSIS — F11.20 OPIOID USE DISORDER, SEVERE, DEPENDENCE (H): Primary | ICD-10-CM

## 2024-04-22 PROCEDURE — 99214 OFFICE O/P EST MOD 30 MIN: CPT | Performed by: FAMILY MEDICINE

## 2024-04-22 PROCEDURE — 80305 DRUG TEST PRSMV DIR OPT OBS: CPT | Performed by: FAMILY MEDICINE

## 2024-04-22 PROCEDURE — G2211 COMPLEX E/M VISIT ADD ON: HCPCS | Performed by: FAMILY MEDICINE

## 2024-04-22 PROCEDURE — 2894A VOIDCORRECT: CPT | Performed by: FAMILY MEDICINE

## 2024-04-22 RX ORDER — BUPRENORPHINE AND NALOXONE 2; .5 MG/1; MG/1
1 FILM, SOLUBLE BUCCAL; SUBLINGUAL DAILY
Qty: 21 FILM | Refills: 0 | Status: SHIPPED | OUTPATIENT
Start: 2024-04-22 | End: 2024-05-09

## 2024-04-22 ASSESSMENT — PATIENT HEALTH QUESTIONNAIRE - PHQ9: SUM OF ALL RESPONSES TO PHQ QUESTIONS 1-9: 0

## 2024-04-22 NOTE — PROGRESS NOTES
[facility] - Recovery Clinic Follow Up    ASSESSMENT/PLAN                                                      1. Opioid use disorder, severe, dependence (H)  Sustained remission.  He did take additional 1/2 film for past week or so due to feeling like he had some withdrawal (hot/cold sweats, improved with Suboxone), so he is out early.  Discussed option of increasing dose but he would like to continue Suboxone 2-0.5mg daily.  Has Narcan.    - Drugs of Abuse Screen Urine (POC CUPS) POCT  - buprenorphine HCl-naloxone HCl (SUBOXONE) 2-0.5 MG per film; Place 1 Film under the tongue daily  Dispense: 21 Film; Refill: 0    2. Nicotine use disorder  Encouraged him to try Chantix as previously prescribed.  He was very focused on keeping accurate measure of vaping which seemed overwhelming to him, we discussed being more generally mindful of vaping (try leaving it in another room or across room).  He plan to resume Chantix and will report back at follow-up.       Return in about 3 weeks (around 5/13/2024) for Follow up, in person.  He prefers to try a 3 week follow-up.        Patient counseling completed today:  Discussed mechanism of action, potential risks/benefits/side effects of medications and other recommendations above.     Discussed risk of precipitated withdrawal with initiation of buprenorphine in the presence of full opioid agonists.    Reviewed directions for initiation of buprenorphine to reduce risk of precipitated withdrawal and maximize efficacy.    Harm reduction counseling including never use alone, availability of naloxone, risks associated with concurrent use of opioids and benzodiazepines, alcohol, or other sedatives, safer administration as applicable.  Discussed importance of avoiding isolation, building a network of supportive relationships, avoiding people/places/things associated with past use to reduce risk of relapse; including motivational interviewing regarding psychosocial  "interventions.   SUBJECTIVE                                                          CC/HPI:  Reji Goldstein is a 23 year old male with PMH nicotine use disorder and opioid use disorder on buprenorphine who presents to the Recovery Clinic for return visit.    Brief History:  Reji Goldstein was first seen in Recovery Clinic on 05/15/23. They were referred by staff from [facility] ED after pt was seen there in 3/2023 requesting rx for buprenorphine.   Patient's reasons for seeking treatment on this date included wanting to continue taper off buprenorphine.     Pt started buprenorphine for treatment of OUD through [facility] in 12/2021.  Max dose 16mg/day.  He began tapering dose on his own in Fall, 2022.  He has continued to decrease dose since that time.  He denies return to use of fentanyl since 12/2021.       4/1/2024 visit:  He is taking the one film a day buprenorphine-naloxone 2-0.5 mg a day. He is feeling \"pretty fine.\" He denies cravings for opioids. No return to use. Last use was December 2021. He is feeling good about sobriety. He got rid of friends he used to use around. He had to choose being sober over old friends. His sleep is good. He works for [employer], in [employer]. He works at night. So he his sleepy today. No withdrawal symptoms. He is still vaping. He vapes one cartridge 6 days. He wants to quit but feels he cannot. He has used the gum in the past not anymore. He puffs on the vape all day. He talked about the mindless connection between vaping and how quick it is to just use it and not know. Not feeling too constipated, way better than before. He is taking amitza. He is not taking senna or miralax. He does have narcan.  His mood is good. He likes to make t-shirt designs.         Substance Use History :  Opioids:   Age at first use: 19  Current use: substance: Perc 30's ; quantity stated never counted but 10 pills would be the max he would use daily ; route: inhalation  ; timing of last use: 12/2021;              "    IV drug use: No   History of overdose: Yes: one episode in a vehicle with friends 7/2020 x2  Previous residential or outpatient treatments for addiction : Yes: [facility]  in patient   Previous medication treatments for addiction: Yes: Currently is taking Suboxone   Longest period of sobriety: 2021 to present  Medical complications related to substance use: denies   Hepatitis C: no record of testing  HIV: 2019 HIV ag/ab nonreactive     Other Addiction History:  Stimulants   Denies   Sedatives/hypnotics/anxiolytics:   Denies   Alcohol:   Denies   Nicotine:   H/o vaping  Cannabis:   Denies   Hallucinogens/Dissociatives:   Denies   Eating disorder:  Denies   Gambling:              Denies       PAST PSYCHIATRIC HISTORY:  Diagnoses- denies   Suicide Attempts: No   Hospitalizations: No            3/6/2024     2:00 PM 2024    12:00 PM 2024    10:00 AM   PHQ   PHQ-9 Total Score 0 0 0   Q9: Thoughts of better off dead/self-harm past 2 weeks Not at all Not at all Not at all       Social History  Housing status: with family father and 8 siblings (pt's mother  in [place] , hit by impaired )  Employment status: part time [employer], [employer]   Relationship status: Single  Children: no children  Legal: denies       Most recent Recovery Clinic visit: 24    A/P last visit:  Opioid use disorder, severe, dependence (H)  Encounter for monitoring opioid maintenance therapy  Remains in sustained remission.  Patient in the past has been doing an elective taper off buprenorphine. Today he continues to be stable at his dose and wishes to proceed at current dose for now. No cravings or withdrawal symptoms. I agree with this plan - I re-iterated to the patient there is no need to completely come off if he is doing well at his current dose. Continue buprenorphine-naloxone 2-0.5 mg SL daily.  Pt is aware tapering is not required and process can be reversed at any time.  I discussed with patient  screening for Hepatitis C and HIV and he consented to both tests. I also offered additional STI screening which he declined.   -     Drugs of Abuse Screen Urine (POC CUPS) POCT  -     buprenorphine HCl-naloxone HCl (SUBOXONE) 2-0.5 MG per film; Place 1 Film under the tongue daily  -     Fentanyl Qualitative with Reflex to Quant Urine; Future  -     Hepatitis C antibody; Future  -     HIV Antigen Antibody Combo; Future     Nicotine use disorder  He previously was using nicotine gum occasionally. He stopped it. He wants to quit vaping but isn't sure he can. We discussed medication options to stop nicotine and he was interested in varenicline. We discussed potential side effects like increased in vivid dreams and potential changes in mood. Patient to call clinic if any adverse effects and can stop medication. We also discussed being more mindful of his vaping habits and recording his vaping use as a first step in cutting back.   -     varenicline (CHANTIX FRANSISCA) 0.5 MG X 11 & 1 MG X 42 tablet; Take 0.5 mg tab daily for 3 days, THEN 0.5 mg tab twice daily for 4 days, THEN 1 mg twice daily.     Therapeutic opioid-induced constipation (OIC)  Patient's constipation much improved on the following medication. Has tried other options like senna and miralax that have not worked for his opioid induced constipation.   -     lubiprostone (AMITIZA) 24 MCG capsule; Take 1 capsule (24 mcg) by mouth 2 times daily (with meals)    04/22/24 visit:  Was taking Suboxone 2-0.5mg daily  Having some hot/cold sweats for the past week while at work - took extra 1/2 film daily for past week or so  No cravings  Having some dry lips past few days, using Vaseline  Started Chantix, took one dose but stopped because he felt overwhelmed by monitoring his vaping use/frequency, no side effects noted  Mood is ok, tired (working nights, hard time falling asleep after work)  Appetite is normal      Labs discussed with patient?  Yes      [place]  Prescription Drug Monitoring Program Reviewed:  Yes; as expected    Medications:  Current Outpatient Medications   Medication Sig Dispense Refill    buprenorphine HCl-naloxone HCl (SUBOXONE) 2-0.5 MG per film Place 1 Film under the tongue daily 21 Film 0    lubiprostone (AMITIZA) 24 MCG capsule Take 1 capsule (24 mcg) by mouth 2 times daily (with meals) 60 capsule 1    naloxone (NARCAN) 4 MG/0.1ML nasal spray Spray 1 spray (4 mg) into one nostril alternating nostrils as needed for opioid reversal every 2-3 minutes until assistance arrives (Patient not taking: Reported on 4/1/2024) 0.2 mL 0    varenicline (CHANTIX FRANSISCA) 0.5 MG X 11 & 1 MG X 42 tablet Take 0.5 mg tab daily for 3 days, THEN 0.5 mg tab twice daily for 4 days, THEN 1 mg twice daily. (Patient not taking: Reported on 4/22/2024) 53 tablet 0     No current facility-administered medications for this visit.       No Known Allergies    PMH, PSH, FamHx reviewed      OBJECTIVE                                                      /74 (BP Location: Left arm, Patient Position: Sitting, Cuff Size: Adult Regular)   Pulse 80     Physical Exam  Vitals and nursing note reviewed.   Constitutional:       General: He is not in acute distress.     Appearance: Normal appearance. He is not ill-appearing or diaphoretic.   HENT:      Mouth/Throat:      Mouth: Mucous membranes are moist.   Eyes:      General: No scleral icterus.  Cardiovascular:      Rate and Rhythm: Normal rate.   Pulmonary:      Effort: Pulmonary effort is normal. No respiratory distress.   Skin:     Coloration: Skin is not jaundiced or pale.   Neurological:      General: No focal deficit present.      Mental Status: He is alert and oriented to person, place, and time.      Gait: Gait normal.   Psychiatric:         Mood and Affect: Mood normal.         Behavior: Behavior normal.         Thought Content: Thought content normal.         Judgment: Judgment normal.         Labs:    UDS:    Lab Results    Component Value Date    BUP Screen Positive (A) 04/22/2024    BZO Negative 04/22/2024    BAR Negative 04/22/2024    RAMON Negative 04/22/2024    MAMP Negative 04/22/2024    AMP Negative 04/22/2024    MDMA Negative 04/22/2024    MTD Negative 04/22/2024    [id number] Negative 04/22/2024    OXY Negative 04/22/2024    PCP Negative 04/22/2024    THC Negative 04/22/2024    TEMP 90 F 04/22/2024    SGPOCT 1.005 04/22/2024     *POC urine drug screen does not screen for Fentanyl      Recent Results (from the past 240 hour(s))   Drugs of Abuse Screen Urine (POC CUPS) POCT    Collection Time: 04/22/24 10:14 AM   Result Value Ref Range    POCT Kit Lot Number [id number]     POCT Kit Expiration Date 2025-10-23     Temperature Urine POCT 90 F 90 F, 92 F, 94 F, 96 F, 98 F, 100 F    Specific [place] POCT 1.005 1.005, 1.015, 1.025    pH Qual Urine POCT 7 pH 4 pH, 5 pH, 7 pH, 9 pH    Creatinine Qual Urine POCT 100 mg/dL 20 mg/dL, 50 mg/dL, 100 mg/dL, 200 mg/dL    Internal QC Qual Urine POCT Valid Valid    Amphetamine Qual Urine POCT Negative Negative    Barbiturate Qual Urine POCT Negative Negative    Buprenorphine Qual Urine POCT Screen Positive (A) Negative    Benzodiazepine Qual Urine POCT Negative Negative    Cocaine Qual Urine POCT Negative Negative    Methamphetamine Qual Urine POCT Negative Negative    MDMA Qual Urine POCT Negative Negative    Methadone Qual Urine POCT Negative Negative    Opiate Qual Urine POCT Negative Negative    Oxycodone Qual Urine POCT Negative Negative    Phencyclidine Qual Urine POCT Negative Negative    THC Qual Urine POCT Negative Negative         Mena Alcala, DO  [facility]  [address] [place] [id number] [phone]

## 2024-04-22 NOTE — NURSING NOTE
[facility] - Recovery Clinic      Rooming information:  Approximate last use of full opioid agonist: 12/2021  Taking buprenorphine? Yes:   How much per day? 2.5 films daily   Number of buprenorphine films/tablets remaining currently: none  Side effects related to buprenorphine (constipation, dry mouth, sedation?) Dry lips   Narcan currently available: Yes  Other recent substance use:    Denies  NICOTINE-Yes: Vaping  If using nicotine, ready to quit? No    Point of care urine drug screen positive for:  Lab Results   Component Value Date    BUP Screen Positive (A) 04/22/2024    BZO Negative 04/22/2024    BAR Negative 04/22/2024    RAMON Negative 04/22/2024    MAMP Negative 04/22/2024    AMP Negative 04/22/2024    MDMA Negative 04/22/2024    MTD Negative 04/22/2024    [id number] Negative 04/22/2024    OXY Negative 04/22/2024    PCP Negative 04/22/2024    THC Negative 04/22/2024    TEMP 90 F 04/22/2024    SGPOCT 1.005 04/22/2024       *POC urine drug screen does not screen for Fentanyl        4/22/2024    10:00 AM   PHQ Assesment Total Score(s)   PHQ-9 Score 0       If PHQ-9 score of 15 or higher, has Recovery Clinic therapist or provider been notified? N/A    Any current suicidal ideation? No  If yes, has Recovery Clinic therapist or provider been notified? N/A    Primary care provider: Physician No Ref-Primary     Mental health provider: No (follow up on MH referral if needed)    Housing needs: Stable    Insurance: Active    Current legal issues: None    Contact information up to date? Yes     3rd Party Involvement not today (please obtain ELBA if pt would like to include)    Manuela Dubon MA  April 22, 2024  10:07 AM

## 2024-05-09 ENCOUNTER — OFFICE VISIT (OUTPATIENT)
Dept: BEHAVIORAL HEALTH | Facility: CLINIC | Age: 24
End: 2024-05-09
Payer: COMMERCIAL

## 2024-05-09 VITALS — SYSTOLIC BLOOD PRESSURE: 123 MMHG | DIASTOLIC BLOOD PRESSURE: 85 MMHG | HEART RATE: 77 BPM

## 2024-05-09 DIAGNOSIS — F17.200 NICOTINE USE DISORDER: ICD-10-CM

## 2024-05-09 DIAGNOSIS — F11.20 OPIOID USE DISORDER, SEVERE, DEPENDENCE (H): Primary | ICD-10-CM

## 2024-05-09 PROCEDURE — 99214 OFFICE O/P EST MOD 30 MIN: CPT | Performed by: NURSE PRACTITIONER

## 2024-05-09 PROCEDURE — 80305 DRUG TEST PRSMV DIR OPT OBS: CPT | Performed by: NURSE PRACTITIONER

## 2024-05-09 RX ORDER — BUPRENORPHINE AND NALOXONE 2; .5 MG/1; MG/1
1 FILM, SOLUBLE BUCCAL; SUBLINGUAL DAILY
Qty: 45 FILM | Refills: 0 | Status: SHIPPED | OUTPATIENT
Start: 2024-05-09 | End: 2024-06-04

## 2024-05-09 ASSESSMENT — PATIENT HEALTH QUESTIONNAIRE - PHQ9: SUM OF ALL RESPONSES TO PHQ QUESTIONS 1-9: 0

## 2024-05-09 NOTE — PROGRESS NOTES
[facility] - Recovery [facility] Follow Up    ASSESSMENT/PLAN                                                      1. Opioid use disorder, severe, dependence (H)  Stable in sustained remission since 12/2021. Is taking suboxone 2 mg daily, but will occasionally take an additional 2 mg as needed for withdrawal (mainly sweats/chills). Continue suboxone 2 mg daily, providing few extra films to take an additional 2 mg daily as needed.   Confirms narcan access.   - Drugs of Abuse Screen Urine (POC CUPS) POCT  - buprenorphine HCl-naloxone HCl (SUBOXONE) 2-0.5 MG per film; Place 1 Film under the tongue daily May take an additional 2 mg daily as needed for withdrawal.  Dispense: 45 Film; Refill: 0    2. Nicotine use disorder  Is using ENDS, not ready to quit at this time. Has varenicline available to him when he feels ready to quit.            No follow-ups on file.      Patient counseling completed today:  Discussed mechanism of action, potential risks/benefits/side effects of medications and other recommendations above.     Discussed risk of precipitated withdrawal with initiation of buprenorphine in the presence of full opioid agonists.    Reviewed directions for initiation of buprenorphine to reduce risk of precipitated withdrawal and maximize efficacy.    Harm reduction counseling including never use alone, availability of naloxone, risks associated with concurrent use of opioids and benzodiazepines, alcohol, or other sedatives, safer administration as applicable.  Discussed importance of avoiding isolation, building a network of supportive relationships, avoiding people/places/things associated with past use to reduce risk of relapse; including motivational interviewing regarding psychosocial interventions.   SUBJECTIVE                                                          CC/HPI:  Reji Goldstein is a 23 year old male with PMH nicotine use disorder and opioid use disorder on buprenorphine who presents to the  Recovery Clinic for return visit.    First Recovery Clinic visit 5/15/23    Most recent Recovery Clinic visit 4/22/2024  Was taking Suboxone 2-0.5mg daily  Having some hot/cold sweats for the past week while at work - took extra 1/2 film daily for past week or so  No cravings  Having some dry lips past few days, using Vaseline  Started Chantix, took one dose but stopped because he felt overwhelmed by monitoring his vaping use/frequency, no side effects noted  Mood is ok, tired (working nights, hard time falling asleep after work)  Appetite is normal.    A/P last visit:  1. Opioid use disorder, severe, dependence (H)  Sustained remission.  He did take additional 1/2 film for past week or so due to feeling like he had some withdrawal (hot/cold sweats, improved with Suboxone), so he is out early.  Discussed option of increasing dose but he would like to continue Suboxone 2-0.5mg daily.  Has Narcan.    - Drugs of Abuse Screen Urine (POC CUPS) POCT  - buprenorphine HCl-naloxone HCl (SUBOXONE) 2-0.5 MG per film; Place 1 Film under the tongue daily  Dispense: 21 Film; Refill: 0     2. Nicotine use disorder  Encouraged him to try Chantix as previously prescribed.  He was very focused on keeping accurate measure of vaping which seemed overwhelming to him, we discussed being more generally mindful of vaping (try leaving it in another room or across room).  He plan to resume Chantix and will report back at follow-up.                   Return in about 3 weeks (around 5/13/2024) for Follow up, in person.  He prefers to try a 3 week follow-up.         05/09/24 visit:  Came in a few days early for his appointment, states he lost his AVS and thought his appointment was today. Only has 1 film left, has taken an additional 2 mg on occasion due withdrawal (sweats/chills)  Denies cravings or any desire to return to use.   Mood is okay, sleeping.   Staying busy working full time at [employer].   Is not taking varenicline, still  vaping.    Last date of full opioid agonist use: 2021  Brief History:  Reji Goldstein was first seen in Recovery Clinic on 05/15/23. They were referred by staff from [facility] ED after pt was seen there in 3/2023 requesting rx for buprenorphine.   Patient's reasons for seeking treatment on this date included wanting to continue taper off buprenorphine.     Pt started buprenorphine for treatment of OUD through [facility] in 2021.  Max dose 16mg/day.  He began tapering dose on his own in 2022.  He has continued to decrease dose since that time.  He denies return to use of fentanyl since 2021.  Substance Use History :  Opioids:   Age at first use: 19  Current use: substance: Perc 30's ; quantity stated never counted but 10 pills would be the max he would use daily ; route: inhalation  ; timing of last use: 2021;                 IV drug use: No   History of overdose: Yes: one episode in a vehicle with friends 7/2020 x2  Previous residential or outpatient treatments for addiction : Yes: [facility] in  in patient   Previous medication treatments for addiction: Yes: Currently is taking Suboxone   Longest period of sobriety: 2021 to present  Medical complications related to substance use: denies   Hepatitis C: no record of testing  HIV: 2019 HIV ag/ab nonreactive     Other Addiction History:  Stimulants   Denies   Sedatives/hypnotics/anxiolytics:   Denies   Alcohol:   Denies   Nicotine:   H/o vaping  Cannabis:   Denies   Hallucinogens/Dissociatives:   Denies   Eating disorder:  Denies   Gambling:              Denies       PAST PSYCHIATRIC HISTORY:  Diagnoses- denies   Suicide Attempts: No   Hospitalizations: No          2024    12:00 PM 2024    10:00 AM 2024    10:00 AM   PHQ   PHQ-9 Total Score 0 0 0   Q9: Thoughts of better off dead/self-harm past 2 weeks Not at all Not at all Not at all       Social History  Housing status: with family father and 8 siblings (pt's mother  in [place] , hit  by impaired )  Employment status: part time [employer], [employer], work's nights   Relationship status: Single  Children: no children  Legal: denies       Labs discussed with patient?  Yes      [place] Prescription Drug Monitoring Program Reviewed:  Yes;                  04/22/2024 04/22/2024 2 Suboxone 4 Mg-1 Mg Sl Film 10.00 20 Ka Par [id number] Romel ([id number]) 0/0 2.00 mg Medicaid [place]   04/01/2024 04/01/2024 2 Suboxone 2 Mg-0.5 Mg Sl Film 30.00 30 Ab Mariano [id number] Romel ([id number])             Medications:  Current Outpatient Medications   Medication Sig Dispense Refill    buprenorphine HCl-naloxone HCl (SUBOXONE) 2-0.5 MG per film Place 1 Film under the tongue daily 21 Film 0    lubiprostone (AMITIZA) 24 MCG capsule Take 1 capsule (24 mcg) by mouth 2 times daily (with meals) 60 capsule 1    naloxone (NARCAN) 4 MG/0.1ML nasal spray Spray 1 spray (4 mg) into one nostril alternating nostrils as needed for opioid reversal every 2-3 minutes until assistance arrives (Patient not taking: Reported on 4/1/2024) 0.2 mL 0    varenicline (CHANTIX FRANSISCA) 0.5 MG X 11 & 1 MG X 42 tablet Take 0.5 mg tab daily for 3 days, THEN 0.5 mg tab twice daily for 4 days, THEN 1 mg twice daily. (Patient not taking: Reported on 4/22/2024) 53 tablet 0     No current facility-administered medications for this visit.       No Known Allergies    PMH, PSH, FamHx reviewed      OBJECTIVE                                                      /85   Pulse 77     Physical Exam  Cardiovascular:      Rate and Rhythm: Normal rate.   Pulmonary:      Effort: Pulmonary effort is normal. No respiratory distress.   Neurological:      General: No focal deficit present.      Mental Status: He is alert and oriented to person, place, and time.   Psychiatric:         Attention and Perception: Attention normal.         Mood and Affect: Mood normal.         Speech: Speech normal.         Behavior: Behavior is cooperative.         Thought Content: Thought content normal.  Thought content does not include suicidal ideation.         Judgment: Judgment normal.         Labs:    UDS:    Lab Results   Component Value Date    BUP Screen Positive (A) 04/22/2024    BZO Negative 04/22/2024    BAR Negative 04/22/2024    RAMON Negative 04/22/2024    MAMP Negative 04/22/2024    AMP Negative 04/22/2024    MDMA Negative 04/22/2024    MTD Negative 04/22/2024    [id number] Negative 04/22/2024    OXY Negative 04/22/2024    PCP Negative 04/22/2024    THC Negative 04/22/2024    TEMP 90 F 04/22/2024    SGPOCT 1.005 04/22/2024     *POC urine drug screen does not screen for Fentanyl      No results found for this or any previous visit (from the past 240 hour(s)).            Mera Reyes, [facility]  [address] [place] [id number]  [phone]

## 2024-05-09 NOTE — NURSING NOTE
[facility]    Pt returned to  for follow up visit, last visit was on  4/22/24. Pt has No questions or concerns would like to discuss with provider.   Rooming information:    Approximate last use of full opioid agonist: 12/2021  Taking buprenorphine? Yes:   How much per day? 2.5 films  Number of buprenorphine films/tablets remaining currently: 0  Side effects related to buprenorphine (constipation, dry mouth, sedation?) No    Narcan currently available: Yes  Other recent substance use:    Denies  NICOTINE-Yes: vaping  If using nicotine, ready to quit? No    Point of care urine drug screen positive for:  Lab Results   Component Value Date    BUP Screen Positive (A) 05/09/2024    BZO Negative 05/09/2024    BAR Negative 05/09/2024    RAMON Negative 05/09/2024    MAMP Negative 05/09/2024    AMP Negative 05/09/2024    MDMA Negative 05/09/2024    MTD Negative 05/09/2024    [id number] Negative 05/09/2024    OXY Negative 05/09/2024    PCP Negative 05/09/2024    THC Negative 05/09/2024    TEMP 90 F 05/09/2024    SGPOCT 1.025 05/09/2024       *POC urine drug screen does not screen for Fentanyl          5/9/2024    10:00 AM   PHQ Assesment Total Score(s)   PHQ-9 Score 0       If PHQ-9 score of 15 or higher, has Recovery Clinic therapist or provider been notified? No    Any current suicidal ideation? No  If yes, has Recovery Clinic therapist or provider been notified? No    Primary care provider: Provider Not In System     Mental health provider: none (follow up on MH referral if needed)    Housing needs: stable    Insurance: active    Current legal issues: none    Contact information up to date? yes    3rd Party Involvement not today (please obtain ELBA if pt would like to include)    Jyoti Mcdonough MA  May 9, 2024  10:21 AM

## 2024-06-04 ENCOUNTER — OFFICE VISIT (OUTPATIENT)
Dept: BEHAVIORAL HEALTH | Facility: CLINIC | Age: 24
End: 2024-06-04
Payer: COMMERCIAL

## 2024-06-04 VITALS — SYSTOLIC BLOOD PRESSURE: 124 MMHG | DIASTOLIC BLOOD PRESSURE: 77 MMHG | HEART RATE: 73 BPM

## 2024-06-04 DIAGNOSIS — F11.20 OPIOID USE DISORDER, SEVERE, DEPENDENCE (H): ICD-10-CM

## 2024-06-04 DIAGNOSIS — T40.2X5A THERAPEUTIC OPIOID-INDUCED CONSTIPATION (OIC): ICD-10-CM

## 2024-06-04 DIAGNOSIS — Z51.81 ENCOUNTER FOR MONITORING OPIOID MAINTENANCE THERAPY: Primary | ICD-10-CM

## 2024-06-04 DIAGNOSIS — Z79.891 ENCOUNTER FOR MONITORING OPIOID MAINTENANCE THERAPY: Primary | ICD-10-CM

## 2024-06-04 DIAGNOSIS — F17.200 NICOTINE USE DISORDER: ICD-10-CM

## 2024-06-04 DIAGNOSIS — K59.03 THERAPEUTIC OPIOID-INDUCED CONSTIPATION (OIC): ICD-10-CM

## 2024-06-04 LAB
AMPHETAMINE QUAL URINE POCT: NEGATIVE
BARBITURATE QUAL URINE POCT: NEGATIVE
BENZODIAZEPINE QUAL URINE POCT: NEGATIVE
BUPRENORPHINE QUAL URINE POCT: ABNORMAL
COCAINE QUAL URINE POCT: NEGATIVE
CREAT UR-MCNC: 370 MG/DL
CREATININE QUAL URINE POCT: ABNORMAL
INTERNAL QC QUAL URINE POCT: ABNORMAL
MDMA QUAL URINE POCT: NEGATIVE
METHADONE QUAL URINE POCT: NEGATIVE
METHAMPHETAMINE QUAL URINE POCT: NEGATIVE
OPIATE QUAL URINE POCT: NEGATIVE
OXYCODONE QUAL URINE POCT: NEGATIVE
PH QUAL URINE POCT: ABNORMAL
PHENCYCLIDINE QUAL URINE POCT: NEGATIVE
POCT KIT EXPIRATION DATE: ABNORMAL
POCT KIT LOT NUMBER: ABNORMAL
SPECIFIC GRAVITY POCT: 1.01
TEMPERATURE URINE POCT: ABNORMAL
THC QUAL URINE POCT: NEGATIVE

## 2024-06-04 PROCEDURE — G0480 DRUG TEST DEF 1-7 CLASSES: HCPCS | Performed by: NURSE PRACTITIONER

## 2024-06-04 PROCEDURE — 99214 OFFICE O/P EST MOD 30 MIN: CPT | Performed by: NURSE PRACTITIONER

## 2024-06-04 PROCEDURE — 80305 DRUG TEST PRSMV DIR OPT OBS: CPT | Performed by: NURSE PRACTITIONER

## 2024-06-04 RX ORDER — BUPRENORPHINE AND NALOXONE 2; .5 MG/1; MG/1
1 FILM, SOLUBLE BUCCAL; SUBLINGUAL DAILY
Qty: 45 FILM | Refills: 0 | Status: SHIPPED | OUTPATIENT
Start: 2024-06-04 | End: 2024-08-09

## 2024-06-04 ASSESSMENT — PATIENT HEALTH QUESTIONNAIRE - PHQ9: SUM OF ALL RESPONSES TO PHQ QUESTIONS 1-9: 0

## 2024-06-04 NOTE — NURSING NOTE
[facility]      Rooming information:    Approximate last use of full opioid agonist: 12//2021  Taking buprenorphine? Yes: suboxone  How much per day? 2.5 films  Number of buprenorphine films/tablets remaining currently: a couple   Side effects related to buprenorphine (constipation, dry mouth, sedation?) Yes: dry mouth/lips   Narcan currently available: Yes  Other recent substance use:    Denies  NICOTINE-Yes:   If using nicotine, ready to quit? No    Point of care urine drug screen positive for:  Lab Results   Component Value Date    BUP Screen Positive (A) 06/04/2024    BZO Negative 06/04/2024    BAR Negative 06/04/2024    RAMON Negative 06/04/2024    MAMP Negative 06/04/2024    AMP Negative 06/04/2024    MDMA Negative 06/04/2024    MTD Negative 06/04/2024    [id number] Negative 06/04/2024    OXY Negative 06/04/2024    PCP Negative 06/04/2024    THC Negative 06/04/2024    TEMP 90 F 06/04/2024    SGPOCT 1.015 06/04/2024       *POC urine drug screen does not screen for Fentanyl      Depression Response    Patient completed the PHQ-9 assessment for depression and scored >9? No; completed, not positive   Question 9 on the PHQ-9 was positive for suicidality? No  Does patient have current mental health provider? No    Is this a virtual visit? No    I personally notified the following: DIAZ           6/4/2024     1:00 PM   PHQ Assesment Total Score(s)   PHQ-9 Score 0         Housing needs: stable     Insurance: active     Current legal issues: none    Contact information up to date? Yes     3rd Party Involvement  (please obtain ELBA if pt would like to include)    Pablo Rowland MA  June 4, 2024  1:38 PM

## 2024-06-04 NOTE — PROGRESS NOTES
[facility] Follow Up    ASSESSMENT/PLAN                                                      1. Opioid use disorder, severe, dependence (H)  Patient is taking 3 mg daily of suboxone, and reports symptoms are controlled. Denies return to use. Is coming in a few days early again this month due to travel plans. He will be gone 2 weeks.   Continue suboxone unchanged.   Confirms narcan access.   - Drugs of Abuse Screen Urine (POC CUPS) POCT  - buprenorphine HCl-naloxone HCl (SUBOXONE) 2-0.5 MG per film; Place 1 Film under the tongue daily May take an additional 2 mg daily as needed for withdrawal.  Dispense: 45 Film; Refill: 0    2. Encounter for monitoring opioid maintenance therapy  - Drug Confirmation Panel Urine with Creat - lab collect; Future    3. Nicotine use disorder  Still vaping, has not started varenicline yet. Has available when he is ready to quit.     4. Therapeutic opioid-induced constipation (OIC)  Has not start amitiza. Reports constipation has been managed without it. Has available if needed.              Return in about 1 month (around 7/4/2024) for Follow up, in person walk in.      Patient counseling completed today:  Discussed mechanism of action, potential risks/benefits/side effects of medications and other recommendations above.     Discussed risk of precipitated withdrawal with initiation of buprenorphine in the presence of full opioid agonists.    Reviewed directions for initiation of buprenorphine to reduce risk of precipitated withdrawal and maximize efficacy.    Harm reduction counseling including never use alone, availability of naloxone, risks associated with concurrent use of opioids and benzodiazepines, alcohol, or other sedatives, safer administration as applicable.  Discussed importance of avoiding isolation, building a network of supportive relationships, avoiding people/places/things associated with past use to reduce risk of relapse; including motivational  interviewing regarding psychosocial interventions.   SUBJECTIVE                                                          CC/HPI:  Reji Goldstein is a 23 year old male with PMH nicotine use disorder and opioid use disorder on buprenorphine who presents to the Recovery Clinic for return visit.     First Recovery Clinic visit 5/15/23    Most recent Recovery Clinic visit 5/9/2024  .05/09/24 visit:  Came in a few days early for his appointment, states he lost his AVS and thought his appointment was today. Only has 1 film left, has taken an additional 2 mg on occasion due withdrawal (sweats/chills)  Denies cravings or any desire to return to use.   Mood is okay, sleeping.   Staying busy working full time at [employer].   Is not taking varenicline, still vaping.    A/P last visit:  1. Opioid use disorder, severe, dependence (H)  Stable in sustained remission since 12/2021. Is taking suboxone 2 mg daily, but will occasionally take an additional 2 mg as needed for withdrawal (mainly sweats/chills). Continue suboxone 2 mg daily, providing few extra films to take an additional 2 mg daily as needed.   Confirms narcan access.   - Drugs of Abuse Screen Urine (POC CUPS) POCT  - buprenorphine HCl-naloxone HCl (SUBOXONE) 2-0.5 MG per film; Place 1 Film under the tongue daily May take an additional 2 mg daily as needed for withdrawal.  Dispense: 45 Film; Refill: 0     2. Nicotine use disorder  Is using ENDS, not ready to quit at this time. Has varenicline available to him when he feels ready to quit.        06/04/24 visit:  Is leaving for [facility] this afternoon for his cousin's wedding. Will be gone 2 weeks. Will need an early refill today to have enough medication for his vacation. Last refilled 5/9.   Things have been going well. Has been taking suboxone 3 mg daily. Reports cravings are controlled, and denies return to use.   Has not started amitiza, denies constipation. Has available if needed,   Still vaping has not started chantix. Not  ready to quit.   Mood has been okay, sleeping okay.         Last date of full opioid agonist use: 12/2021    Brief History:  Reji Goldstein was first seen in Recovery Clinic on 05/15/23. They were referred by staff from [facility] ED after pt was seen there in 3/2023 requesting rx for buprenorphine.   Patient's reasons for seeking treatment on this date included wanting to continue taper off buprenorphine.     Pt started buprenorphine for treatment of OUD through [facility] in 12/2021.  Max dose 16mg/day.  He began tapering dose on his own in Fall, 2022.  He has continued to decrease dose since that time.  He denies return to use of fentanyl since 12/2021.  Substance Use History :  Opioids:   Age at first use: 19  Current use: substance: Perc 30's ; quantity stated never counted but 10 pills would be the max he would use daily ; route: inhalation  ; timing of last use: 12/2021;                 IV drug use: No   History of overdose: Yes: one episode in a vehicle with friends 7/2020 x2  Previous residential or outpatient treatments for addiction : Yes: [facility] in 2021 in patient   Previous medication treatments for addiction: Yes: Currently is taking Suboxone   Longest period of sobriety: 12/2021 to present  Medical complications related to substance use: denies   Hepatitis C: no record of testing  HIV: 8/14/2019 HIV ag/ab nonreactive     Other Addiction History:  Stimulants   Denies   Sedatives/hypnotics/anxiolytics:   Denies   Alcohol:   Denies   Nicotine:   H/o vaping  Cannabis:   Denies   Hallucinogens/Dissociatives:   Denies   Eating disorder:  Denies   Gambling:              Denies       PAST PSYCHIATRIC HISTORY:  Diagnoses- denies   Suicide Attempts: No   Hospitalizations: No       4/22/2024    10:00 AM 5/9/2024    10:00 AM 6/4/2024     1:00 PM   PHQ   PHQ-9 Total Score 0 0 0   Q9: Thoughts of better off dead/self-harm past 2 weeks Not at all Not at all Not at all     Social History  Housing status: with family father and  8 siblings (pt's mother  in MVC 2019, hit by impaired )  Employment status: part time [employer], [email or web address], work's nights   Relationship status: Single  Children: no children  Legal: denies          Labs discussed with patient?  Yes      [place] Prescription Drug Monitoring Program Reviewed:  Yes;                  2024 2 Suboxone 2 Mg-0.5 Mg Sl Film 45.00 22 He Bat [id number] Romel ([id number]) 0/0 4.09 mg Medicaid [place]   2024 2 Suboxone 4 Mg-1 Mg Sl Film 10.00 20 Ka Par [id number] Romel ([id number]) 0/0 2.00 mg [place]   2024 2 Suboxone 2 Mg-0.5 Mg Sl Film 30.00 30 Ab Mariano [id number]              Medications:  Current Outpatient Medications   Medication Sig Dispense Refill    buprenorphine HCl-naloxone HCl (SUBOXONE) 2-0.5 MG per film Place 1 Film under the tongue daily May take an additional 2 mg daily as needed for withdrawal. 45 Film 0    lubiprostone (AMITIZA) 24 MCG capsule Take 1 capsule (24 mcg) by mouth 2 times daily (with meals) 60 capsule 1    naloxone (NARCAN) 4 MG/0.1ML nasal spray Spray 1 spray (4 mg) into one nostril alternating nostrils as needed for opioid reversal every 2-3 minutes until assistance arrives (Patient not taking: Reported on 2024) 0.2 mL 0    varenicline (CHANTIX FRANSISCA) 0.5 MG X 11 & 1 MG X 42 tablet Take 0.5 mg tab daily for 3 days, THEN 0.5 mg tab twice daily for 4 days, THEN 1 mg twice daily. (Patient not taking: Reported on 2024) 53 tablet 0     No current facility-administered medications for this visit.       No Known Allergies    PMH, PSH, FamHx reviewed      OBJECTIVE                                                      /77   Pulse 73     Physical Exam  Pulmonary:      Effort: Pulmonary effort is normal. No respiratory distress.   Neurological:      General: No focal deficit present.      Mental Status: He is alert and oriented to person, place, and time.   Psychiatric:         Attention and Perception: Attention normal.          Mood and Affect: Mood normal.         Speech: Speech normal.         Behavior: Behavior is cooperative.         Thought Content: Thought content normal. Thought content does not include suicidal ideation.         Judgment: Judgment normal.         Labs:    UDS:    Lab Results   Component Value Date    BUP Screen Positive (A) 05/09/2024    BZO Negative 05/09/2024    BAR Negative 05/09/2024    RAMON Negative 05/09/2024    MAMP Negative 05/09/2024    AMP Negative 05/09/2024    MDMA Negative 05/09/2024    MTD Negative 05/09/2024    [id number] Negative 05/09/2024    OXY Negative 05/09/2024    PCP Negative 05/09/2024    THC Negative 05/09/2024    TEMP 90 F 05/09/2024    SGPOCT 1.025 05/09/2024     *POC urine drug screen does not screen for Fentanyl      No results found for this or any previous visit (from the past 240 hour(s)).            Mera Reyes, CNP    [place] [id number] [place] [address] [id number] [phone]

## 2024-06-06 LAB
BUPRENORPHINE UR CFM-MCNC: 177 NG/ML
BUPRENORPHINE/CREAT UR: 48 NG/MG {CREAT}
NALOXONE UR CFM-MCNC: 546 NG/ML
NALOXONE: 148 NG/MG {CREAT}
NORBUPRENORPHINE UR CFM-MCNC: 164 NG/ML
NORBUPRENORPHINE/CREAT UR: 44 NG/MG {CREAT}

## 2024-06-28 ENCOUNTER — HOSPITAL ENCOUNTER (EMERGENCY)
Facility: CLINIC | Age: 24
Discharge: HOME | End: 2024-06-28
Attending: FAMILY MEDICINE | Admitting: FAMILY MEDICINE
Payer: COMMERCIAL

## 2024-06-28 VITALS
HEIGHT: 70 IN | SYSTOLIC BLOOD PRESSURE: 112 MMHG | TEMPERATURE: 98 F | WEIGHT: 150 LBS | DIASTOLIC BLOOD PRESSURE: 76 MMHG | HEART RATE: 76 BPM | BODY MASS INDEX: 21.47 KG/M2 | OXYGEN SATURATION: 100 % | RESPIRATION RATE: 16 BRPM

## 2024-06-28 DIAGNOSIS — F11.23 OPIOID DEPENDENCE WITH WITHDRAWAL (H): ICD-10-CM

## 2024-06-28 PROCEDURE — 99284 EMERGENCY DEPT VISIT MOD MDM: CPT | Performed by: FAMILY MEDICINE

## 2024-06-28 PROCEDURE — 99283 EMERGENCY DEPT VISIT LOW MDM: CPT | Performed by: FAMILY MEDICINE

## 2024-06-28 RX ORDER — BUPRENORPHINE AND NALOXONE 2; .5 MG/1; MG/1
1 FILM, SOLUBLE BUCCAL; SUBLINGUAL DAILY
Qty: 30 FILM | Refills: 0 | Status: CANCELLED | OUTPATIENT
Start: 2024-06-28

## 2024-06-28 RX ORDER — BUPRENORPHINE AND NALOXONE 2; .5 MG/1; MG/1
1 FILM, SOLUBLE BUCCAL; SUBLINGUAL DAILY
Qty: 30 FILM | Refills: 0 | Status: SHIPPED | OUTPATIENT
Start: 2024-06-28 | End: 2024-07-03

## 2024-06-28 RX ORDER — DOCUSATE SODIUM 100 MG/1
1 CAPSULE, LIQUID FILLED ORAL EVERY MORNING
COMMUNITY
End: 2024-08-09

## 2024-06-28 ASSESSMENT — ACTIVITIES OF DAILY LIVING (ADL): ADLS_ACUITY_SCORE: 33

## 2024-06-28 ASSESSMENT — COLUMBIA-SUICIDE SEVERITY RATING SCALE - C-SSRS
2. HAVE YOU ACTUALLY HAD ANY THOUGHTS OF KILLING YOURSELF IN THE PAST MONTH?: NO
6. HAVE YOU EVER DONE ANYTHING, STARTED TO DO ANYTHING, OR PREPARED TO DO ANYTHING TO END YOUR LIFE?: NO
1. IN THE PAST MONTH, HAVE YOU WISHED YOU WERE DEAD OR WISHED YOU COULD GO TO SLEEP AND NOT WAKE UP?: NO

## 2024-06-28 NOTE — ED PROVIDER NOTES
"    [facility] EMERGENCY DEPARTMENT ([facility])    6/28/24       ED PROVIDER NOTE    History     Chief Complaint   Patient presents with    Addiction Problem     HPI  Reji Goldstein is a 24 year old male who presents to the ED for evaluation of .  Ongoing opiate dependence and patient is currently dependent on Suboxone he has been unable to get his prescription filled and states that he cannot get into his clinic until Monday or Tuesday.  Patient typically takes 3 mg Suboxone daily and is requesting prescription for her at this time.    Past Medical History  Past Medical History:   Diagnosis Date    Substance abuse (H)      History reviewed. No pertinent surgical history.  buprenorphine HCl-naloxone HCl (SUBOXONE) 2-0.5 MG per film  buprenorphine HCl-naloxone HCl (SUBOXONE) 2-0.5 MG per film  docusate sodium (DSS) 100 MG capsule  lubiprostone (AMITIZA) 24 MCG capsule  naloxone (NARCAN) 4 MG/0.1ML nasal spray  varenicline (CHANTIX FRANSISCA) 0.5 MG X 11 & 1 MG X 42 tablet      No Known Allergies  Family History  History reviewed. No pertinent family history.  Social History   Social History     Tobacco Use    Smoking status: Some Days     Types: Vaping Device     Passive exposure: Never    Tobacco comments:     vape   Vaping Use    Vaping status: Every Day    Substances: Nicotine, Flavoring    Devices: Disposable   Substance Use Topics    Alcohol use: Not Currently    Drug use: Not Currently     Comment: last use 12/2021      A medically appropriate review of systems was performed with pertinent positives and negatives noted in the HPI, and all other systems negative.    Physical Exam   BP: 112/76  Pulse: 76  Temp: 98  F (36.7  C)  Resp: 16  Height: 177.8 cm (5' 10\")  Weight: 68 kg (150 lb)  SpO2: 100 %  Physical Exam  Constitutional:       General: He is not in acute distress.     Appearance: Normal appearance. He is not toxic-appearing.   HENT:      Head: Atraumatic.   Eyes:      General: No scleral icterus.     " Conjunctiva/sclera: Conjunctivae normal.   Cardiovascular:      Rate and Rhythm: Normal rate.      Heart sounds: Normal heart sounds.   Pulmonary:      Effort: Pulmonary effort is normal. No respiratory distress.      Breath sounds: Normal breath sounds.   Abdominal:      Palpations: Abdomen is soft.      Tenderness: There is no abdominal tenderness.   Musculoskeletal:         General: No deformity.      Cervical back: Neck supple.   Skin:     General: Skin is warm.   Neurological:      General: No focal deficit present.      Mental Status: He is alert and oriented to person, place, and time.      Sensory: No sensory deficit.      Motor: No weakness.      Coordination: Coordination normal.   Psychiatric:         Mood and Affect: Mood is anxious.           ED Course, Procedures, & Data      Procedures         No results found for any visits on 06/28/24.  Medications - No data to display  Labs Ordered and Resulted from Time of ED Arrival to Time of ED Departure - No data to display  No orders to display          Critical care was not performed.     Medical Decision Making  The patient's presentation was of moderate complexity (a chronic illness mild to moderate exacerbation, progression, or side effect of treatment).    The patient's evaluation involved:  history and exam without other MDM data elements    The patient's management necessitated moderate risk (prescription drug management including medications given in the ED).    Assessment & Plan        I have reviewed the nursing notes. I have reviewed the findings, diagnosis, plan and need for follow up with the patient.    Discharge Medication List as of 6/28/2024  4:20 PM        START taking these medications    Details   !! buprenorphine HCl-naloxone HCl (SUBOXONE) 2-0.5 MG per film Place 1 Film under the tongue daily, Disp-30 Film, R-0, E-Prescribe       !! - Potential duplicate medications found. Please discuss with provider.          Final diagnoses:   Opioid  dependence with withdrawal (H)         [facility] EMERGENCY DEPARTMENT  6/28/2024     Mauriiso Juan MD  06/28/24 1950

## 2024-06-28 NOTE — ED TRIAGE NOTES
Here for suboxone, Ran out of suboxone   Triage Assessment (Adult)       Row Name 06/28/24 1544          Triage Assessment    Airway WDL WDL        Respiratory WDL    Respiratory WDL WDL        Skin Circulation/Temperature WDL    Skin Circulation/Temperature WDL WDL        Cardiac WDL    Cardiac WDL WDL        Peripheral/Neurovascular WDL    Peripheral Neurovascular WDL WDL        Cognitive/Neuro/Behavioral WDL    Cognitive/Neuro/Behavioral WDL WDL        Christa Coma Scale    Best Eye Response 4-->(E4) spontaneous     Best Motor Response 6-->(M6) obeys commands     Best Verbal Response 5-->(V5) oriented     [place] Coma Scale Score 15

## 2024-06-28 NOTE — DISCHARGE INSTRUCTIONS
Discharge to home plan on picking up Suboxone at [facility] follow-up with your outpatient clinic.

## 2024-07-03 ENCOUNTER — OFFICE VISIT (OUTPATIENT)
Dept: BEHAVIORAL HEALTH | Facility: CLINIC | Age: 24
End: 2024-07-03
Payer: COMMERCIAL

## 2024-07-03 VITALS — DIASTOLIC BLOOD PRESSURE: 67 MMHG | HEART RATE: 82 BPM | SYSTOLIC BLOOD PRESSURE: 105 MMHG

## 2024-07-03 DIAGNOSIS — T40.2X5A THERAPEUTIC OPIOID-INDUCED CONSTIPATION (OIC): ICD-10-CM

## 2024-07-03 DIAGNOSIS — K59.03 THERAPEUTIC OPIOID-INDUCED CONSTIPATION (OIC): ICD-10-CM

## 2024-07-03 DIAGNOSIS — F11.20 OPIOID USE DISORDER, SEVERE, DEPENDENCE (H): Primary | ICD-10-CM

## 2024-07-03 PROCEDURE — 99214 OFFICE O/P EST MOD 30 MIN: CPT | Performed by: NURSE PRACTITIONER

## 2024-07-03 PROCEDURE — 80305 DRUG TEST PRSMV DIR OPT OBS: CPT | Performed by: NURSE PRACTITIONER

## 2024-07-03 RX ORDER — BUPRENORPHINE AND NALOXONE 2; .5 MG/1; MG/1
1 FILM, SOLUBLE BUCCAL; SUBLINGUAL 2 TIMES DAILY
Qty: 60 FILM | Refills: 0 | Status: SHIPPED | OUTPATIENT
Start: 2024-07-03 | End: 2024-08-09

## 2024-07-03 ASSESSMENT — PATIENT HEALTH QUESTIONNAIRE - PHQ9: SUM OF ALL RESPONSES TO PHQ QUESTIONS 1-9: 0

## 2024-07-03 NOTE — NURSING NOTE
[facility]      Rooming information:    Approximate last use of full opioid agonist: 2021  Taking buprenorphine? Yes:   How much per day? 2.5 films  Number of buprenorphine films/tablets remaining currently: 0  Side effects related to buprenorphine (constipation, dry mouth, sedation?) No   Narcan currently available: Yes  Other recent substance use:    Denies  NICOTINE-Yes:   If using nicotine, ready to quit? No    Point of care urine drug screen positive for:  Lab Results   Component Value Date    BUP Screen Positive (A) 07/03/2024    BZO Negative 07/03/2024    BAR Negative 07/03/2024    RAMON Negative 07/03/2024    MAMP Negative 07/03/2024    AMP Negative 07/03/2024    MDMA Negative 07/03/2024    MTD Negative 07/03/2024    [id number] Negative 07/03/2024    OXY Negative 07/03/2024    PCP Negative 07/03/2024    THC Negative 07/03/2024    TEMP 90 F 07/03/2024    SGPOCT 1.025 07/03/2024       *POC urine drug screen does not screen for Fentanyl      Depression Response    Patient completed the PHQ-9 assessment for depression and scored >9? No  Question 9 on the PHQ-9 was positive for suicidality? No  Does patient have current mental health provider? No    Is this a virtual visit? No    I personally notified the following: DIAZ          7/3/2024     1:00 PM   PHQ Assesment Total Score(s)   PHQ-9 Score 0         Housing needs: stable    Insurance: active    Current legal issues: none    Contact information up to date? yes    3rd Party Involvement not today (please obtain ELBA if pt would like to include)    Jyoti Mcdonough MA  July 3, 2024  1:37 PM

## 2024-07-03 NOTE — PROGRESS NOTES
[facility] Recovery Clinic Follow Up    ASSESSMENT/PLAN                                                      1. Opioid use disorder, severe, dependence (H)  Reporting that he began taking 4 mg suboxone to treat withdrawal symptoms (mainly sweats/chills) when working causing him to run out of suboxone. Did received #30 films in ED on 6/28. Plan to increase suboxone to 2 mg BID, explained that he will not be able to  new rx until 7/12.   Confirms narcan access.   - Drugs of Abuse Screen Urine (POC CUPS) POCT  - buprenorphine HCl-naloxone HCl (SUBOXONE) 2-0.5 MG per film; Place 1 Film under the tongue 2 times daily  Dispense: 60 Film; Refill: 0    2. Therapeutic opioid-induced constipation (OIC)  Reporting symptoms after running out of Amitiza. Has a refill on profile at [place], confirmed with pharmacy.    Encouraged patient to  from pharmacy.            Return in about 5 weeks (around 8/9/2024) for Follow up, with me, in person 130 pm.      Patient counseling completed today:  Discussed mechanism of action, potential risks/benefits/side effects of medications and other recommendations above.     Discussed risk of precipitated withdrawal with initiation of buprenorphine in the presence of full opioid agonists.    Reviewed directions for initiation of buprenorphine to reduce risk of precipitated withdrawal and maximize efficacy.    Harm reduction counseling including never use alone, availability of naloxone, risks associated with concurrent use of opioids and benzodiazepines, alcohol, or other sedatives, safer administration as applicable.  Discussed importance of avoiding isolation, building a network of supportive relationships, avoiding people/places/things associated with past use to reduce risk of relapse; including motivational interviewing regarding psychosocial interventions.   SUBJECTIVE                                                        CC/HPI:  Rejiryan Goldstein is a 23 year old  male with PMH nicotine use disorder and opioid use disorder on buprenorphine who presents to the Recovery Clinic for return visit.     First Recovery Clinic visit 5/15/23    Most recent Recovery Clinic visit 6/4/2024  .Is leaving for [facility] this afternoon for his cousin's wedding. Will be gone 2 weeks. Will need an early refill today to have enough medication for his vacation. Last refilled 5/9.   Things have been going well. Has been taking suboxone 3 mg daily. Reports cravings are controlled, and denies return to use.   Has not started amitiza, denies constipation. Has available if needed,   Still vaping has not started chantix. Not ready to quit.   Mood has been okay, sleeping okay.        A/P last visit:  1. Opioid use disorder, severe, dependence (H)  Patient is taking 3 mg daily of suboxone, and reports symptoms are controlled. Denies return to use. Is coming in a few days early again this month due to travel plans. He will be gone 2 weeks.   Continue suboxone unchanged.   Confirms narcan access.   - Drugs of Abuse Screen Urine (POC CUPS) POCT  - buprenorphine HCl-naloxone HCl (SUBOXONE) 2-0.5 MG per film; Place 1 Film under the tongue daily May take an additional 2 mg daily as needed for withdrawal.  Dispense: 45 Film; Refill: 0     2. Encounter for monitoring opioid maintenance therapy  - Drug Confirmation Panel Urine with Creat - lab collect; Future     3. Nicotine use disorder  Still vaping, has not started varenicline yet. Has available when he is ready to quit.      4. Therapeutic opioid-induced constipation (OIC)  Has not start amitiza. Reports constipation has been managed without it. Has available if needed.           07/03/24 visit:  Has been taking suboxone 3-4 mg daily to treat withdrawal (sweats and chills) when working. He ran out suboxone and went to ED 6/28 for refill until he could get into clinic. Received script for #30 suboxone 2 mg films.   Had a good time in [place] for wedding.   Mood has been  good.     Last date of full opioid agonist use: 2021     Brief History:  Reji Goldstein was first seen in Recovery Clinic on 05/15/23. They were referred by staff from [facility] ED after pt was seen there in 3/2023 requesting rx for buprenorphine.   Patient's reasons for seeking treatment on this date included wanting to continue taper off buprenorphine.     Pt started buprenorphine for treatment of OUD through [facility] in 2021.  Max dose 16mg/day.  He began tapering dose on his own in 2022.  He has continued to decrease dose since that time.  He denies return to use of fentanyl since 2021.  Substance Use History :  Opioids:   Age at first use: 19  Current use: substance: Perc 30's ; quantity stated never counted but 10 pills would be the max he would use daily ; route: inhalation  ; timing of last use: 2021;                 IV drug use: No   History of overdose: Yes: one episode in a vehicle with friends 7/2020 x2  Previous residential or outpatient treatments for addiction : Yes: [facility] in  in patient   Previous medication treatments for addiction: Yes: Currently is taking Suboxone   Longest period of sobriety: 2021 to present  Medical complications related to substance use: denies   Hepatitis C: no record of testing  HIV: 2019 HIV ag/ab nonreactive     Other Addiction History:  Stimulants   Denies   Sedatives/hypnotics/anxiolytics:   Denies   Alcohol:   Denies   Nicotine:   H/o vaping  Cannabis:   Denies   Hallucinogens/Dissociatives:   Denies   Eating disorder:  Denies   Gambling:              Denies       PAST PSYCHIATRIC HISTORY:  Diagnoses- denies   Suicide Attempts: No   Hospitalizations: No       2024    10:00 AM 2024     1:00 PM 7/3/2024     1:00 PM   PHQ   PHQ-9 Total Score 0 0 0   Q9: Thoughts of better off dead/self-harm past 2 weeks Not at all Not at all Not at all       Social History  Housing status: with family father and 8 siblings (pt's mother  in [place] , hit by  impaired )  Employment status: part time [employer], [employer], work's [employer]   Relationship status: Single  Children: no children  Legal: denies          Labs discussed with patient?  Yes      [place] Prescription Drug Monitoring Program Reviewed:  Yes;   06/28/2024 06/28/2024 2 Suboxone 2 Mg-0.5 Mg Sl Film 30.00 30 Er Chr [id number] Romel ([id number]) 0/0 2.00 mg Medicaid [place]   06/04/2024 06/04/2024 2 Suboxone 2 Mg-0.5 Mg Sl Film 45.00 23 He Bat [id number]            Medications:  Current Outpatient Medications   Medication Sig Dispense Refill    buprenorphine HCl-naloxone HCl (SUBOXONE) 2-0.5 MG per film Place 1 Film under the tongue 2 times daily 60 Film 0    buprenorphine HCl-naloxone HCl (SUBOXONE) 2-0.5 MG per film Place 1 Film under the tongue daily May take an additional 2 mg daily as needed for withdrawal. 45 Film 0    docusate sodium (DSS) 100 MG capsule Take 1 capsule by mouth every morning      lubiprostone (AMITIZA) 24 MCG capsule Take 1 capsule (24 mcg) by mouth 2 times daily (with meals) 60 capsule 1    naloxone (NARCAN) 4 MG/0.1ML nasal spray Spray 1 spray (4 mg) into one nostril alternating nostrils as needed for opioid reversal every 2-3 minutes until assistance arrives (Patient not taking: Reported on 4/1/2024) 0.2 mL 0    varenicline (CHANTIX FRANSISCA) 0.5 MG X 11 & 1 MG X 42 tablet Take 0.5 mg tab daily for 3 days, THEN 0.5 mg tab twice daily for 4 days, THEN 1 mg twice daily. (Patient not taking: Reported on 4/22/2024) 53 tablet 0     No current facility-administered medications for this visit.       No Known Allergies    PMH, PSH, FamHx reviewed      OBJECTIVE                                                      /67   Pulse 82     Physical Exam  Cardiovascular:      Rate and Rhythm: Normal rate.   Pulmonary:      Effort: Pulmonary effort is normal. No respiratory distress.   Neurological:      General: No focal deficit present.      Mental Status: He is alert and oriented to person, place, and time.    Psychiatric:         Attention and Perception: Attention normal.         Mood and Affect: Mood normal.         Speech: Speech normal.         Behavior: Behavior is cooperative.         Thought Content: Thought content normal. Thought content does not include suicidal ideation.         Judgment: Judgment normal.         Labs:    UDS:    Lab Results   Component Value Date    BUP Screen Positive (A) 07/03/2024    BZO Negative 07/03/2024    BAR Negative 07/03/2024    RAMON Negative 07/03/2024    MAMP Negative 07/03/2024    AMP Negative 07/03/2024    MDMA Negative 07/03/2024    MTD Negative 07/03/2024    [id number] Negative 07/03/2024    OXY Negative 07/03/2024    PCP Negative 07/03/2024    THC Negative 07/03/2024    TEMP 90 F 07/03/2024    SGPOCT 1.025 07/03/2024     *POC urine drug screen does not screen for Fentanyl      Recent Results (from the past 240 hour(s))   Drugs of Abuse Screen Urine (POC CUPS) POCT    Collection Time: 07/03/24  1:24 PM   Result Value Ref Range    POCT Kit Lot Number [id number]     POCT Kit Expiration Date 2026-02-28     Temperature Urine POCT 90 F 90 F, 92 F, 94 F, 96 F, 98 F, 100 F    Specific [place] POCT 1.025 1.005, 1.015, 1.025    pH Qual Urine POCT 7 pH 4 pH, 5 pH, 7 pH, 9 pH    Creatinine Qual Urine POCT 100 mg/dL 20 mg/dL, 50 mg/dL, 100 mg/dL, 200 mg/dL    Internal QC Qual Urine POCT Valid Valid    Amphetamine Qual Urine POCT Negative Negative    Barbiturate Qual Urine POCT Negative Negative    Buprenorphine Qual Urine POCT Screen Positive (A) Negative    Benzodiazepine Qual Urine POCT Negative Negative    Cocaine Qual Urine POCT Negative Negative    Methamphetamine Qual Urine POCT Negative Negative    MDMA Qual Urine POCT Negative Negative    Methadone Qual Urine POCT Negative Negative    Opiate Qual Urine POCT Negative Negative    Oxycodone Qual Urine POCT Negative Negative    Phencyclidine Qual Urine POCT Negative Negative    THC Qual Urine POCT Negative Negative             Mera A.  JASWINDER Reyes    [facility]  [address] [place] [id number]  [phone]

## 2024-08-09 ENCOUNTER — OFFICE VISIT (OUTPATIENT)
Dept: BEHAVIORAL HEALTH | Facility: CLINIC | Age: 24
End: 2024-08-09
Payer: COMMERCIAL

## 2024-08-09 VITALS — DIASTOLIC BLOOD PRESSURE: 67 MMHG | SYSTOLIC BLOOD PRESSURE: 120 MMHG | HEART RATE: 74 BPM

## 2024-08-09 DIAGNOSIS — K59.03 THERAPEUTIC OPIOID-INDUCED CONSTIPATION (OIC): ICD-10-CM

## 2024-08-09 DIAGNOSIS — F17.200 NICOTINE USE DISORDER: ICD-10-CM

## 2024-08-09 DIAGNOSIS — F11.20 OPIOID USE DISORDER, SEVERE, DEPENDENCE (H): Primary | ICD-10-CM

## 2024-08-09 DIAGNOSIS — T40.2X5A THERAPEUTIC OPIOID-INDUCED CONSTIPATION (OIC): ICD-10-CM

## 2024-08-09 LAB
AMPHETAMINE QUAL URINE POCT: NEGATIVE
BARBITURATE QUAL URINE POCT: NEGATIVE
BENZODIAZEPINE QUAL URINE POCT: NEGATIVE
BUPRENORPHINE QUAL URINE POCT: ABNORMAL
COCAINE QUAL URINE POCT: NEGATIVE
CREATININE QUAL URINE POCT: ABNORMAL
INTERNAL QC QUAL URINE POCT: ABNORMAL
MDMA QUAL URINE POCT: NEGATIVE
METHADONE QUAL URINE POCT: NEGATIVE
METHAMPHETAMINE QUAL URINE POCT: NEGATIVE
OPIATE QUAL URINE POCT: NEGATIVE
OXYCODONE QUAL URINE POCT: NEGATIVE
PH QUAL URINE POCT: ABNORMAL
PHENCYCLIDINE QUAL URINE POCT: NEGATIVE
POCT KIT EXPIRATION DATE: ABNORMAL
POCT KIT LOT NUMBER: ABNORMAL
SPECIFIC GRAVITY POCT: 1.02
TEMPERATURE URINE POCT: ABNORMAL
THC QUAL URINE POCT: ABNORMAL

## 2024-08-09 PROCEDURE — 99214 OFFICE O/P EST MOD 30 MIN: CPT | Performed by: FAMILY MEDICINE

## 2024-08-09 PROCEDURE — 80305 DRUG TEST PRSMV DIR OPT OBS: CPT | Performed by: FAMILY MEDICINE

## 2024-08-09 PROCEDURE — G2211 COMPLEX E/M VISIT ADD ON: HCPCS | Performed by: FAMILY MEDICINE

## 2024-08-09 RX ORDER — POLYETHYLENE GLYCOL 3350 17 G/17G
1 POWDER, FOR SOLUTION ORAL DAILY PRN
Qty: 578 G | Refills: 2 | Status: SHIPPED | OUTPATIENT
Start: 2024-08-09

## 2024-08-09 RX ORDER — DOCUSATE SODIUM 100 MG/1
100 CAPSULE, LIQUID FILLED ORAL 2 TIMES DAILY PRN
Qty: 60 CAPSULE | Refills: 1 | Status: SHIPPED | OUTPATIENT
Start: 2024-08-09

## 2024-08-09 RX ORDER — BUPRENORPHINE AND NALOXONE 2; .5 MG/1; MG/1
FILM, SOLUBLE BUCCAL; SUBLINGUAL
Qty: 75 FILM | Refills: 0 | Status: SHIPPED | OUTPATIENT
Start: 2024-08-09 | End: 2024-09-08

## 2024-08-09 ASSESSMENT — PATIENT HEALTH QUESTIONNAIRE - PHQ9: SUM OF ALL RESPONSES TO PHQ QUESTIONS 1-9: 0

## 2024-08-09 NOTE — NURSING NOTE
[facility]      Rooming information:    Approximate last use of full opioid agonist: 2021  Taking buprenorphine? Yes: suboxone   How much per day? 2.5films   Number of buprenorphine films/tablets remaining currently: 1   Side effects related to buprenorphine (constipation, dry mouth, sedation?) No   Narcan currently available: Yes  Other recent substance use:    Denies  NICOTINE-Yes:   If using nicotine, ready to quit? No    Point of care urine drug screen positive for:  Lab Results   Component Value Date    BUP Screen Positive (A) 08/09/2024    BZO Negative 08/09/2024    BAR Negative 08/09/2024    RAMON Negative 08/09/2024    MAMP Negative 08/09/2024    AMP Negative 08/09/2024    MDMA Negative 08/09/2024    MTD Negative 08/09/2024    [id number] Negative 08/09/2024    OXY Negative 08/09/2024    PCP Negative 08/09/2024    THC Screen Positive (A) 08/09/2024    TEMP 90 F 08/09/2024    SGPOCT 1.025 08/09/2024       *POC urine drug screen does not screen for Fentanyl      Depression Response    Patient completed the PHQ-9 assessment for depression and scored >9? No  Question 9 on the PHQ-9 was positive for suicidality? No  Does patient have current mental health provider? No  C-SSRS screener risk level.       Is this a virtual visit? No    I personally notified the following: NONE          8/9/2024    10:00 AM   PHQ Assesment Total Score(s)   PHQ-9 Score 0         Housing needs: stable     Insurance: active     Current legal issues: none     Contact information up to date? Yes     [employer]  (please obtain ELBA if pt would like to include)    Pablo Rowland MA  August 9, 2024  10:21 AM

## 2024-08-09 NOTE — PROGRESS NOTES
[facility] Recovery [facility] Follow Up    ASSESSMENT/PLAN                                                      1. Opioid use disorder, severe, dependence (H)  Stable.  Continue SL buprenorphine 5mg daily (has self increased due to ongoing withdrawal).  Emphasized importance of consistent dosing.  Has Narcan.  - Drugs of Abuse Screen Urine (POC CUPS) POCT  - buprenorphine HCl-naloxone HCl (SUBOXONE) 2-0.5 MG per film; Place 1 Film under the tongue 2 times daily AND 0.5 Film daily.  Dispense: 75 Film; Refill: 0    2. Therapeutic opioid-induced constipation (OIC)  Needs improvement.  OK to continue Colace and Miralax with Amitiza.  - docusate sodium (DSS) 100 MG capsule; Take 1 capsule (100 mg) by mouth 2 times daily as needed for constipation  Dispense: 60 capsule; Refill: 1  - polyethylene glycol (MIRALAX) 17 GM/Dose powder; Take 17 g (1 Capful) by mouth daily as needed for constipation  Dispense: 578 g; Refill: 2    3. Nicotine use disorder  Has NRT at home, encouraged his plan to start using.           Return in about 4 weeks (around 9/6/2024) for Follow up, in person.      Patient counseling completed today:  Discussed mechanism of action, potential risks/benefits/side effects of medications and other recommendations above.     Discussed risk of precipitated withdrawal with initiation of buprenorphine in the presence of full opioid agonists.    Reviewed directions for initiation of buprenorphine to reduce risk of precipitated withdrawal and maximize efficacy.    Harm reduction counseling including never use alone, availability of naloxone, risks associated with concurrent use of opioids and benzodiazepines, alcohol, or other sedatives, safer administration as applicable.  Discussed importance of avoiding isolation, building a network of supportive relationships, avoiding people/places/things associated with past use to reduce risk of relapse; including motivational interviewing regarding psychosocial  interventions.   SUBJECTIVE                                                          CC/HPI:  Reji Goldstein is a 23 year old male with PMH nicotine use disorder and opioid use disorder on buprenorphine who presents to the Recovery Clinic for return visit.    Brief History:  Reji Goldstein was first seen in Recovery Clinic on 05/15/23. They were referred by staff from [facility] ED after pt was seen there in 3/2023 requesting rx for buprenorphine.   Patient's reasons for seeking treatment on this date included wanting to continue taper off buprenorphine.     Pt started buprenorphine for treatment of OUD through [facility] in 12/2021.  Max dose 16mg/day.  He began tapering dose on his own in Fall, 2022.  He has continued to decrease dose since that time.  He denies return to use of fentanyl since 12/2021.     First Recovery Clinic visit 5/15/23  Most recent Recovery Clinic visit: 7/3/2024   1. Opioid use disorder, severe, dependence (H)  Reporting that he began taking 4 mg suboxone to treat withdrawal symptoms (mainly sweats/chills) when working causing him to run out of suboxone. Did received #30 films in ED on 6/28. Plan to increase suboxone to 2 mg BID, explained that he will not be able to  new rx until 7/12.   Confirms narcan access.   - Drugs of Abuse Screen Urine (POC CUPS) POCT  - buprenorphine HCl-naloxone HCl (SUBOXONE) 2-0.5 MG per film; Place 1 Film under the tongue 2 times daily  Dispense: 60 Film; Refill: 0     2. Therapeutic opioid-induced constipation (OIC)  Reporting symptoms after running out of Amitiza. Has a refill on profile at [place], confirmed with pharmacy.    Encouraged patient to  from pharmacy.        08/09/24 HPI:  Has been taking Suboxone 2-0.5mg film, total 2.5 films divided morning and afternoon, needs extra half film for withdrawal symptoms  No side effects, no cravings  Continues to struggle with constipation despite taking Amitiza, taking every day  Not taking Miralax or  colace  Has nicotine gum at home, will try to use it  Mood is stable  Goes to be early, gets up early but no concerns about falling or staying asleep  Appetite is good      Substance Use History :  Opioids:   Age at first use: 19  Current use: substance: Perc 30's ; quantity stated never counted but 10 pills would be the max he would use daily ; route: inhalation  ; timing of last use: 2021;                 IV drug use: No   History of overdose: Yes: one episode in a vehicle with friends 7/2020 x2  Previous residential or outpatient treatments for addiction : Yes: [facility] in  in patient   Previous medication treatments for addiction: Yes: Currently is taking Suboxone   Longest period of sobriety: 2021 to present  Medical complications related to substance use: denies   Hepatitis C: no record of testing  HIV: 2019 HIV ag/ab nonreactive     Other Addiction History:  Stimulants   Denies   Sedatives/hypnotics/anxiolytics:   Denies   Alcohol:   Denies   Nicotine:   H/o vaping  Cannabis:   Denies   Hallucinogens/Dissociatives:   Denies   Eating disorder:  Denies   Gambling:              Denies       PAST PSYCHIATRIC HISTORY:  Diagnoses- denies   Suicide Attempts: No   Hospitalizations: No         2024     1:00 PM 7/3/2024     1:00 PM 2024    10:00 AM   PHQ   PHQ-9 Total Score 0 0 0   Q9: Thoughts of better off dead/self-harm past 2 weeks Not at all Not at all Not at all       Social History  Housing status: with family father and 8 siblings (pt's mother  in [place] , hit by impaired )  Employment status: full time [employer], [employer], working early morning  Relationship status: Single  Children: no children  Legal: denies       Labs discussed with patient?  Yes      [place] Prescription Drug Monitoring Program Reviewed:  Yes    Medications:  Current Outpatient Medications   Medication Sig Dispense Refill    buprenorphine HCl-naloxone HCl (SUBOXONE) 2-0.5 MG per film Place 1 Film under the  tongue 2 times daily AND 0.5 Film daily. 75 Film 0    docusate sodium (DSS) 100 MG capsule Take 1 capsule (100 mg) by mouth 2 times daily as needed for constipation 60 capsule 1    polyethylene glycol (MIRALAX) 17 GM/Dose powder Take 17 g (1 Capful) by mouth daily as needed for constipation 578 g 2    lubiprostone (AMITIZA) 24 MCG capsule Take 1 capsule (24 mcg) by mouth 2 times daily (with meals) 60 capsule 1    naloxone (NARCAN) 4 MG/0.1ML nasal spray Spray 1 spray (4 mg) into one nostril alternating nostrils as needed for opioid reversal every 2-3 minutes until assistance arrives (Patient not taking: Reported on 4/1/2024) 0.2 mL 0    varenicline (CHANTIX FRANSISCA) 0.5 MG X 11 & 1 MG X 42 tablet Take 0.5 mg tab daily for 3 days, THEN 0.5 mg tab twice daily for 4 days, THEN 1 mg twice daily. (Patient not taking: Reported on 4/22/2024) 53 tablet 0     No current facility-administered medications for this visit.       No Known Allergies    PMH, PSH, FamHx reviewed      OBJECTIVE                                                      /67   Pulse 74     Physical Exam  Vitals and nursing note reviewed.   Constitutional:       General: He is not in acute distress.     Appearance: Normal appearance. He is not ill-appearing or diaphoretic.   Cardiovascular:      Rate and Rhythm: Normal rate.   Pulmonary:      Effort: Pulmonary effort is normal. No respiratory distress.   Neurological:      General: No focal deficit present.      Mental Status: He is alert and oriented to person, place, and time.      Gait: Gait normal.   Psychiatric:         Mood and Affect: Mood normal.         Behavior: Behavior normal.         Thought Content: Thought content normal.         Judgment: Judgment normal.         Labs:    UDS:    Lab Results   Component Value Date    BUP Screen Positive (A) 08/09/2024    BZO Negative 08/09/2024    BAR Negative 08/09/2024    RAMON Negative 08/09/2024    MAMP Negative 08/09/2024    AMP Negative 08/09/2024     MDMA Negative 08/09/2024    MTD Negative 08/09/2024    [id number] Negative 08/09/2024    OXY Negative 08/09/2024    PCP Negative 08/09/2024    THC Screen Positive (A) 08/09/2024    TEMP 90 F 08/09/2024    SGPOCT 1.025 08/09/2024     *POC urine drug screen does not screen for Fentanyl      No results found for this or any previous visit (from the past 240 hour(s)).        Mena Alcala, [facility]  [address] [place] [id number]  [phone]

## 2024-09-08 ENCOUNTER — HOSPITAL ENCOUNTER (EMERGENCY)
Facility: CLINIC | Age: 24
Discharge: HOME | End: 2024-09-08
Attending: EMERGENCY MEDICINE | Admitting: EMERGENCY MEDICINE
Payer: COMMERCIAL

## 2024-09-08 VITALS
DIASTOLIC BLOOD PRESSURE: 68 MMHG | TEMPERATURE: 97.6 F | OXYGEN SATURATION: 99 % | RESPIRATION RATE: 16 BRPM | HEART RATE: 80 BPM | HEIGHT: 70 IN | BODY MASS INDEX: 24.9 KG/M2 | WEIGHT: 173.9 LBS | SYSTOLIC BLOOD PRESSURE: 115 MMHG

## 2024-09-08 DIAGNOSIS — Z76.0 ENCOUNTER FOR MEDICATION REFILL: ICD-10-CM

## 2024-09-08 DIAGNOSIS — F11.20 OPIOID USE DISORDER, SEVERE, DEPENDENCE (H): ICD-10-CM

## 2024-09-08 PROCEDURE — 250N000013 HC RX MED GY IP 250 OP 250 PS 637: Performed by: EMERGENCY MEDICINE

## 2024-09-08 PROCEDURE — 99284 EMERGENCY DEPT VISIT MOD MDM: CPT | Performed by: EMERGENCY MEDICINE

## 2024-09-08 PROCEDURE — 99283 EMERGENCY DEPT VISIT LOW MDM: CPT | Performed by: EMERGENCY MEDICINE

## 2024-09-08 RX ORDER — BUPRENORPHINE AND NALOXONE 2; .5 MG/1; MG/1
FILM, SOLUBLE BUCCAL; SUBLINGUAL
Qty: 25 FILM | Refills: 0 | Status: SHIPPED | OUTPATIENT
Start: 2024-09-08

## 2024-09-08 RX ORDER — BUPRENORPHINE HYDROCHLORIDE AND NALOXONE HYDROCHLORIDE DIHYDRATE 2; .5 MG/1; MG/1
1 TABLET SUBLINGUAL ONCE
Status: COMPLETED | OUTPATIENT
Start: 2024-09-08 | End: 2024-09-08

## 2024-09-08 RX ADMIN — BUPRENORPHINE AND NALOXONE 1 TABLET: 2; .5 TABLET SUBLINGUAL at 20:06

## 2024-09-08 ASSESSMENT — ACTIVITIES OF DAILY LIVING (ADL)
ADLS_ACUITY_SCORE: 33
ADLS_ACUITY_SCORE: 35

## 2024-09-08 ASSESSMENT — LIFESTYLE VARIABLES: TOTAL_SCORE: 0

## 2024-09-08 ASSESSMENT — COLUMBIA-SUICIDE SEVERITY RATING SCALE - C-SSRS
2. HAVE YOU ACTUALLY HAD ANY THOUGHTS OF KILLING YOURSELF IN THE PAST MONTH?: NO
1. IN THE PAST MONTH, HAVE YOU WISHED YOU WERE DEAD OR WISHED YOU COULD GO TO SLEEP AND NOT WAKE UP?: NO

## 2024-09-08 NOTE — DISCHARGE INSTRUCTIONS
Follow-up with your addiction clinic and primary care provider.  Return to the emergency department as needed for any new or worsening symptoms.

## 2024-09-08 NOTE — ED PROVIDER NOTES
"ED Provider Note  [facility]      History     Chief Complaint   Patient presents with    Medication Refill     Needs suboxone refill; last use was today about one hour ago     HPI  Reji Goldstein is a 24 year old male with a history of opioid use disorder who presents to the emergency department for Suboxone refill.  No other complaints.  He does not have his dose this evening.  He will follow-up with the addiction medicine clinic later this week when they are open.    Past Medical History  Past Medical History:   Diagnosis Date    Substance abuse (H)      History reviewed. No pertinent surgical history.  buprenorphine HCl-naloxone HCl (SUBOXONE) 2-0.5 MG per film  docusate sodium (DSS) 100 MG capsule  polyethylene glycol (MIRALAX) 17 GM/Dose powder  lubiprostone (AMITIZA) 24 MCG capsule  naloxone (NARCAN) 4 MG/0.1ML nasal spray  varenicline (CHANTIX FRANSISCA) 0.5 MG X 11 & 1 MG X 42 tablet      No Known Allergies  Family History  History reviewed. No pertinent family history.  Social History   Social History     Tobacco Use    Smoking status: Some Days     Types: Vaping Device     Passive exposure: Never    Tobacco comments:     vape   Vaping Use    Vaping status: Every Day    Substances: Nicotine, Flavoring    Devices: Disposable   Substance Use Topics    Alcohol use: Not Currently    Drug use: Not Currently     Comment: last use 12/2021, takes suboxone      A medically appropriate review of systems was performed with pertinent positives and negatives noted in the HPI, and all other systems negative.    Physical Exam   BP: 115/68  Pulse: 80  Temp: 97.6  F (36.4  C)  Resp: 16  Height: 177.8 cm (5' 10\")  Weight: 78.9 kg (173 lb 14.4 oz)  SpO2: 99 %  Physical Exam  Vitals and nursing note reviewed.   Constitutional:       General: He is not in acute distress.     Appearance: He is well-developed.   HENT:      Head: Normocephalic.      Right Ear: External ear normal.      Left Ear: External ear " normal.      Nose: Nose normal.   Eyes:      Extraocular Movements: Extraocular movements intact.      Conjunctiva/sclera: Conjunctivae normal.   Pulmonary:      Effort: Pulmonary effort is normal. No respiratory distress.   Abdominal:      General: Abdomen is flat. There is no distension.   Musculoskeletal:         General: No deformity. Normal range of motion.      Cervical back: Normal range of motion and neck supple.   Skin:     General: Skin is warm and dry.   Neurological:      Mental Status: He is alert. Mental status is at baseline.      Comments: Oriented   Psychiatric:         Mood and Affect: Mood normal.         Behavior: Behavior normal.           ED Course, Procedures, & Data      Procedures            No results found for any visits on 09/08/24.  Medications   buprenorphine-naloxone (SUBOXONE) 2-0.5 MG sublingual tablet 1 tablet (has no administration in time range)     Labs Ordered and Resulted from Time of ED Arrival to Time of ED Departure - No data to display  No orders to display        Assessment & Plan    24-year-old male presents to us with a chief complaint of needing his Suboxone refill.  He has no other complaints.  We did give him an evening dose as he had run out.  He was given a prescription as well and will follow-up with the recovery clinic next week when they are open    I have reviewed the nursing notes. I have reviewed the findings, diagnosis, plan and need for follow up with the patient.    Current Discharge Medication List          Final diagnoses:   Encounter for medication refill       Eamon Rondon  [facility] EMERGENCY DEPARTMENT  9/8/2024     Eamon Rondon,   09/08/24 [id number]

## 2024-09-09 ENCOUNTER — OFFICE VISIT (OUTPATIENT)
Dept: BEHAVIORAL HEALTH | Facility: CLINIC | Age: 24
End: 2024-09-09
Payer: COMMERCIAL

## 2024-09-09 VITALS — OXYGEN SATURATION: 94 % | DIASTOLIC BLOOD PRESSURE: 70 MMHG | HEART RATE: 85 BPM | SYSTOLIC BLOOD PRESSURE: 109 MMHG

## 2024-09-09 DIAGNOSIS — F11.90 OPIOID USE DISORDER: Primary | ICD-10-CM

## 2024-09-09 DIAGNOSIS — K59.03 THERAPEUTIC OPIOID-INDUCED CONSTIPATION (OIC): ICD-10-CM

## 2024-09-09 DIAGNOSIS — F11.20 OPIOID USE DISORDER, SEVERE, DEPENDENCE (H): ICD-10-CM

## 2024-09-09 DIAGNOSIS — T40.2X5A THERAPEUTIC OPIOID-INDUCED CONSTIPATION (OIC): ICD-10-CM

## 2024-09-09 LAB
AMPHETAMINE QUAL URINE POCT: NEGATIVE
BARBITURATE QUAL URINE POCT: NEGATIVE
BENZODIAZEPINE QUAL URINE POCT: NEGATIVE
BUPRENORPHINE QUAL URINE POCT: ABNORMAL
COCAINE QUAL URINE POCT: NEGATIVE
CREAT UR-MCNC: 70 MG/DL
CREATININE QUAL URINE POCT: ABNORMAL
INTERNAL QC QUAL URINE POCT: ABNORMAL
MDMA QUAL URINE POCT: NEGATIVE
METHADONE QUAL URINE POCT: NEGATIVE
METHAMPHETAMINE QUAL URINE POCT: NEGATIVE
OPIATE QUAL URINE POCT: NEGATIVE
OXYCODONE QUAL URINE POCT: NEGATIVE
PH QUAL URINE POCT: ABNORMAL
PHENCYCLIDINE QUAL URINE POCT: NEGATIVE
POCT KIT EXPIRATION DATE: ABNORMAL
POCT KIT LOT NUMBER: ABNORMAL
SPECIFIC GRAVITY POCT: 1.01
TEMPERATURE URINE POCT: ABNORMAL
THC QUAL URINE POCT: NEGATIVE

## 2024-09-09 PROCEDURE — 80305 DRUG TEST PRSMV DIR OPT OBS: CPT

## 2024-09-09 PROCEDURE — H0038 SELF-HELP/PEER SVC PER 15MIN: HCPCS

## 2024-09-09 PROCEDURE — 99214 OFFICE O/P EST MOD 30 MIN: CPT

## 2024-09-09 PROCEDURE — G0480 DRUG TEST DEF 1-7 CLASSES: HCPCS

## 2024-09-09 RX ORDER — LUBIPROSTONE 24 UG/1
24 CAPSULE ORAL 2 TIMES DAILY WITH MEALS
Qty: 60 CAPSULE | Refills: 1 | Status: SHIPPED | OUTPATIENT
Start: 2024-09-09

## 2024-09-09 RX ORDER — BUPRENORPHINE AND NALOXONE 2; .5 MG/1; MG/1
1 FILM, SOLUBLE BUCCAL; SUBLINGUAL 2 TIMES DAILY
Qty: 60 FILM | Refills: 0 | Status: SHIPPED | OUTPATIENT
Start: 2024-09-20 | End: 2024-09-18

## 2024-09-09 ASSESSMENT — PATIENT HEALTH QUESTIONNAIRE - PHQ9: SUM OF ALL RESPONSES TO PHQ QUESTIONS 1-9: 0

## 2024-09-09 NOTE — CONSULTS
First Step  Program - Initial SUN Consult Note:    Demographics:  Patient name Reji Goldstein   /Age 2000, 24 year old   Gender/Ethnicity male   The patient's reported race is: Black or    Chief Complaint Medication Refill     Language of Care English    No     Writer was unit's assigned Substance Use Navigator (SUN) for the shift and was notified by Ramos Caballero at 18:32 that Reji Goldstein presented seeking Mx for addiction treatment in the context of chronic opioid use. Pt approached in [facility] for SUN consult. Pt endorses using opioids but doesn't share any hx. Patient reports he is only in the ED to receive a dose of suboxone, but does not want to participate in the  program. When writer asks to speak in further length about the program, patient declines. Writer thanks patient for his time and inquires if pt would like anything to make him more comfortable, patient declines.     ED provider and primary RN notified of SUN consult and made aware of Pt's current condition/symptoms.

## 2024-09-09 NOTE — PROGRESS NOTES
[facility] - Recovery Clinic Follow Up    ASSESSMENT/PLAN                                                      1. Opioid use disorder, severe, dependence (H)  -controlled.  No use, no cravings, no withdrawal symptoms  - Drugs of Abuse Screen Urine (POC CUPS) POCT  - Drug Confirmation Panel Urine with Creat - lab collect; Future  - buprenorphine HCl-naloxone HCl (SUBOXONE) 2-0.5 MG per film; Place 1 Film under the tongue 2 times daily.  Dispense: 60 Film; Refill: 0  -confirmed access to narcan  -Counseling provided regarding   Opioid warning reviewed.    Risk of overdose following a period of abstinence due to decrease tolerance was discussed including risk of death.     Strongly recommended abstain from alcohol, benzodiazepines, THC, opioids and other drugs of abuse.     Increased risk of return to opioid use after use of these substances discussed.     Increased risk of overdose/death with use of other substances particularly benzodiazepines/alcohol reviewed.  -follow up October 18.  Next fill scheduled to fill 9/20/24       3. Therapeutic opioid-induced constipation (OIC)  -needs improvement  -take mirilax on a consistent basis  - lubiprostone (AMITIZA) 24 MCG capsule; Take 1 capsule (24 mcg) by mouth 2 times daily (with meals).  Dispense: 60 capsule; Refill: 1  -increase water and fibrous fruits and vegetables such as kiwi, prunes, avocado       Return in about 6 weeks (around 10/18/2024).      Patient counseling completed today:  Discussed mechanism of action, potential risks/benefits/side effects of medications and other recommendations above.     Discussed risk of precipitated withdrawal with initiation of buprenorphine in the presence of full opioid agonists.    Reviewed directions for initiation of buprenorphine to reduce risk of precipitated withdrawal and maximize efficacy.    Harm reduction counseling including never use alone, availability of naloxone, risks associated with concurrent use of  opioids and benzodiazepines, alcohol, or other sedatives, safer administration as applicable.  Discussed importance of avoiding isolation, building a network of supportive relationships, avoiding people/places/things associated with past use to reduce risk of relapse; including motivational interviewing regarding psychosocial interventions.   SUBJECTIVE                                                          CC/HPI:  Reji Goldstein is a 23 year old male with PMH nicotine use disorder and opioid use disorder on buprenorphine who presents to the Recovery Clinic for return visit.t.     Brief History:  Reji Goldstein was first seen in Recovery Clinic on 05/15/23. They were referred by staff from [facility] ED after pt was seen there in 3/2023 requesting rx for buprenorphine.   Patient's reasons for seeking treatment on this date included wanting to continue taper off buprenorphine.     Pt started buprenorphine for treatment of OUD through [facility] in 12/2021.  Max dose 16mg/day.  He began tapering dose on his own in Fall, 2022.  He has continued to decrease dose since that time.  He denies return to use of fentanyl since 12/2021.     First Recovery Clinic visit 5/15/23  Most recent Recovery Clinic visit: 7/3/2024     Recommendations last visit: 8/9/24  1. Opioid use disorder, severe, dependence (H)  Stable.  Continue SL buprenorphine 5mg daily (has self increased due to ongoing withdrawal).  Emphasized importance of consistent dosing.  Has Narcan.  - Drugs of Abuse Screen Urine (POC CUPS) POCT  - buprenorphine HCl-naloxone HCl (SUBOXONE) 2-0.5 MG per film; Place 1 Film under the tongue 2 times daily AND 0.5 Film daily.  Dispense: 75 Film; Refill: 0     2. Therapeutic opioid-induced constipation (OIC)  Needs improvement.  OK to continue Colace and Miralax with Amitiza.  - docusate sodium (DSS) 100 MG capsule; Take 1 capsule (100 mg) by mouth 2 times daily as needed for constipation  Dispense: 60 capsule; Refill: 1  - polyethylene  glycol (MIRALAX) 17 GM/Dose powder; Take 17 g (1 Capful) by mouth daily as needed for constipation  Dispense: 578 g; Refill: 2     3. Nicotine use disorder  Has NRT at home, encouraged his plan to start using.      24 HPI:  Current dose 4 mg. No craving, no withdrawal, no use.  Ran out of subxCloudVertical and presented to ED yesterday,  received RX for 25 films.  Taking 4 mg total daily dose.     Started job at [employer].  Hours 6-3  Some constipation. Eating salads, cucumbers.  Taking mirilax on occasion    Mood good, sleeping ok           Substance Use History :  Opioids:   Age at first use: 19  Current use: substance: Perc 30's ; quantity stated never counted but 10 pills would be the max he would use daily ; route: inhalation  ; timing of last use: 2021;                 IV drug use: No   History of overdose: Yes: one episode in a vehicle with friends 7/2020 x2  Previous residential or outpatient treatments for addiction : Yes: ker[facility] in  in patient   Previous medication treatments for addiction: Yes: Currently is taking Suboxone   Longest period of sobriety: 2021 to present  Medical complications related to substance use: denies   Hepatitis C: no record of testing  HIV: 2019 HIV ag/ab nonreactive     Other Addiction History:  Stimulants   Denies   Sedatives/hypnotics/anxiolytics:   Denies   Alcohol:   Denies   Nicotine:   H/o vaping  Cannabis:   Denies   Hallucinogens/Dissociatives:   Denies   Eating disorder:  Denies   Gambling:              Denies       PAST PSYCHIATRIC HISTORY:  Diagnoses- denies   Suicide Attempts: No   Hospitalizations: No           2024     1:00 PM 7/3/2024     1:00 PM 2024    10:00 AM   PHQ   PHQ-9 Total Score 0 0 0   Q9: Thoughts of better off dead/self-harm past 2 weeks Not at all Not at all Not at all         Social History  Housing status: with family father and 8 siblings (pt's mother  in MVC , hit by impaired )  Employment status: full  time [employer], working early morning  Relationship status: Single  Children: no children  Legal: denies       7/3/2024     1:00 PM 8/9/2024    10:00 AM 9/9/2024     2:00 PM   PHQ   PHQ-9 Total Score 0 0 0   Q9: Thoughts of better off dead/self-harm past 2 weeks Not at all Not at all Not at all           Labs discussed with patient?  Yes      [place] Prescription Drug Monitoring Program Reviewed:  Yes      Medications:  Current Outpatient Medications   Medication Sig Dispense Refill    buprenorphine HCl-naloxone HCl (SUBOXONE) 2-0.5 MG per film Place 1 Film under the tongue 2 times daily AND 0.5 Film daily. 25 Film 0    docusate sodium (DSS) 100 MG capsule Take 1 capsule (100 mg) by mouth 2 times daily as needed for constipation 60 capsule 1    polyethylene glycol (MIRALAX) 17 GM/Dose powder Take 17 g (1 Capful) by mouth daily as needed for constipation 578 g 2    lubiprostone (AMITIZA) 24 MCG capsule Take 1 capsule (24 mcg) by mouth 2 times daily (with meals) (Patient not taking: Reported on 9/9/2024) 60 capsule 1    naloxone (NARCAN) 4 MG/0.1ML nasal spray Spray 1 spray (4 mg) into one nostril alternating nostrils as needed for opioid reversal every 2-3 minutes until assistance arrives (Patient not taking: Reported on 4/1/2024) 0.2 mL 0    varenicline (CHANTIX FRANSISCA) 0.5 MG X 11 & 1 MG X 42 tablet Take 0.5 mg tab daily for 3 days, THEN 0.5 mg tab twice daily for 4 days, THEN 1 mg twice daily. (Patient not taking: Reported on 4/22/2024) 53 tablet 0     No current facility-administered medications for this visit.       No Known Allergies    PMH, PSH, FamHx reviewed      OBJECTIVE                                                      /70   Pulse 85   SpO2 94%     Physical Exam  Constitutional:       Appearance: Normal appearance. He is normal weight.   HENT:      Head: Normocephalic.      Right Ear: External ear normal.      Left Ear: External ear normal.      Nose: Nose normal.   Eyes:      Pupils:  Pupils are equal, round, and reactive to light.   Pulmonary:      Effort: Pulmonary effort is normal.   Abdominal:      General: Abdomen is flat.   Musculoskeletal:         General: Normal range of motion.   Skin:     General: Skin is warm.   Neurological:      General: No focal deficit present.      Mental Status: He is alert.   Psychiatric:         Mood and Affect: Mood normal.         Behavior: Behavior normal.         Thought Content: Thought content normal.         Labs:    UDS:    Lab Results   Component Value Date    BUP Screen Positive (A) 09/09/2024    BZO Negative 09/09/2024    BAR Negative 09/09/2024    RAMON Negative 09/09/2024    MAMP Negative 09/09/2024    AMP Negative 09/09/2024    MDMA Negative 09/09/2024    MTD Negative 09/09/2024    ITZ325 Negative 09/09/2024    OXY Negative 09/09/2024    PCP Negative 09/09/2024    THC Negative 09/09/2024    TEMP Invalid (A) 09/09/2024    SGPOCT 1.015 09/09/2024     *POC urine drug screen does not screen for Fentanyl      Recent Results (from the past 240 hour(s))   Drugs of Abuse Screen Urine (POC CUPS) POCT    Collection Time: 09/09/24  2:16 PM   Result Value Ref Range    POCT Kit Lot Number [id number]     POCT Kit Expiration Date 2026-05-15     Temperature Urine POCT Invalid (A) 90 F, 92 F, 94 F, 96 F, 98 F, 100 F    Specific [place] POCT 1.015 1.005, 1.015, 1.025    pH Qual Urine POCT 5 pH 4 pH, 5 pH, 7 pH, 9 pH    Creatinine Qual Urine POCT 50 mg/dL 20 mg/dL, 50 mg/dL, 100 mg/dL, 200 mg/dL    Internal QC Qual Urine POCT Valid Valid    Amphetamine Qual Urine POCT Negative Negative    Barbiturate Qual Urine POCT Negative Negative    Buprenorphine Qual Urine POCT Screen Positive (A) Negative    Benzodiazepine Qual Urine POCT Negative Negative    Cocaine Qual Urine POCT Negative Negative    Methamphetamine Qual Urine POCT Negative Negative    MDMA Qual Urine POCT Negative Negative    Methadone Qual Urine POCT Negative Negative    Opiate Qual Urine POCT Negative  Negative    Oxycodone Qual Urine POCT Negative Negative    Phencyclidine Qual Urine POCT Negative Negative    THC Qual Urine POCT Negative Negative               Tana Richardson NP    [facility]  [address] [place] [id number] [phone]

## 2024-09-09 NOTE — NURSING NOTE
[facility] - Recovery Clinic      Rooming information:    In ED yesterday     Approximate last use of full opioid agonist: 12/21  Taking buprenorphine? Yes: From ED  How much per day? 2 mg BD  Number of buprenorphine films/tablets remaining currently: 23 left  Side effects related to buprenorphine (constipation, dry mouth, sedation?) No   Narcan currently available: Yes  Other recent substance use:    Denies  NICOTINE-Yes: vape  If using nicotine, ready to quit? Yes: Trying. Has some linda gum    Point of care urine drug screen positive for:  Lab Results   Component Value Date    BUP Screen Positive (A) 09/09/2024    BZO Negative 09/09/2024    BAR Negative 09/09/2024    RAMON Negative 09/09/2024    MAMP Negative 09/09/2024    AMP Negative 09/09/2024    MDMA Negative 09/09/2024    MTD Negative 09/09/2024    QRB695 Negative 09/09/2024    OXY Negative 09/09/2024    PCP Negative 09/09/2024    THC Negative 09/09/2024    TEMP Invalid (A) 09/09/2024    SGPOCT 1.015 09/09/2024       *POC urine drug screen does not screen for Fentanyl      Depression Response    Patient completed the PHQ-9 assessment for depression and scored >9? No  Question 9 on the PHQ-9 was positive for suicidality? No  Does patient have current mental health provider? No  C-SSRS screener risk level.       Is this a virtual visit? No        9/9/2024     2:00 PM   PHQ Assesment Total Score(s)   PHQ-9 Score 0         Housing needs: Stable     Insurance: None    Current legal issues: Active    Contact information up to date? Yes    3rd Party Involvement: None today  (please obtain ELBA if pt would like to include)    Bina Martinez RN  September 9, 2024  2:09 PM

## 2024-09-10 NOTE — PROGRESS NOTES
"[facility] - Recovery Clinic    Peer  met with Reji Goldstein in the Recovery Clinic to introduce himself, detail services provided and discuss current status of recovery. Pt appeared alert, oriented and open to feedback during our discussion.     Pt arrives for visit with provider for suboxone.  [facility] sees patient today under provider diagnosis of opioid substance disorder, severe, dependence  (H)       Subjective (S)-  Pt stated \"I can do it on my own.  I am fine.  I'm not like these people, those drug addicts.\"    Objective (O)-  Pt looked desheveled.   Pt behaved beligerently, argumentative, hostile.    Assessment (A)- N/A    Plan (P)-   PRC Daniel Shared lived experience, showed empathy, tolerance, acceptance and kindness.    [facility] provided business card to pt welcoming contact for recovery based support and resources. PRC and pt agree to speak again during an upcoming  visit.           Service Type:     Individual     Session Start Time: 2:20pm                      Session End Time:  2:40pm      Session Length:  20 minutes           Patient Goal:   To utilize suboxone assisted treatment for sobriety and long term recovery.     Goal Progress:   Ongoing.    Key Risk Factors to Recovery:   [facility] encourages being aware of risk factors that can lead to re-use which include avoiding isolation, avoiding triggers and managing cravings in a healthy manner. being open and willing to acceptance and change on a daily basis.  [facility] encourages pt to establish a sober network calling tree to reach out to when needed.  Continue to practice honesty with ourselves and trusted support person(s).   [facility] encourages regular attendance at recovery based meetings as well as finding a sponsor for mentoring and accountability.   [facility] encourages consideration of other services such as counseling for mental health issues which can correlate with our substance use.      Support Needs:   Ongoing care, support and resources for " opioid substance use disorder as well as other substances.     Follow up:   PRC has provided pt with his contact information for support and resource needs.    PRC and pt agree to meet during an upcoming  visit.       [facility]  [address], [address]   [place], [place], [address]  Clinic Phone: [phone]  Clinic Fax: [phone]  Peer  phone:  [phone]    Open Monday - Friday  9:00am-4:00pm  Walk in hours: 9am-3pm      Maco Sanchez  September 10, 2024  11:27 AM    MAURI Vigil provides clinical oversite and supervision of care.

## 2024-09-11 LAB
BUPRENORPHINE UR CFM-MCNC: 49 NG/ML
BUPRENORPHINE/CREAT UR: 70 NG/MG {CREAT}
NALOXONE UR CFM-MCNC: 165 NG/ML
NALOXONE: 236 NG/MG {CREAT}
NORBUPRENORPHINE UR CFM-MCNC: 129 NG/ML
NORBUPRENORPHINE/CREAT UR: 184 NG/MG {CREAT}

## 2024-09-18 ENCOUNTER — TELEPHONE (OUTPATIENT)
Dept: BEHAVIORAL HEALTH | Facility: CLINIC | Age: 24
End: 2024-09-18
Payer: COMMERCIAL

## 2024-09-18 DIAGNOSIS — F11.90 OPIOID USE DISORDER: ICD-10-CM

## 2024-09-18 DIAGNOSIS — F11.20 OPIOID USE DISORDER, SEVERE, DEPENDENCE (H): ICD-10-CM

## 2024-09-18 RX ORDER — BUPRENORPHINE AND NALOXONE 2; .5 MG/1; MG/1
1 FILM, SOLUBLE BUCCAL; SUBLINGUAL 2 TIMES DAILY
Qty: 60 FILM | Refills: 0 | Status: SHIPPED | OUTPATIENT
Start: 2024-09-20 | End: 2024-10-06

## 2024-09-18 NOTE — TELEPHONE ENCOUNTER
New Rx sent to [place] [facility]. States he thought he has 23 films left from his last rx but did not. [facility]'s unable to fill as rx sent stated to fill 9/20. RN unable to speak to anyone at [facility] to approve fill.

## 2024-09-18 NOTE — TELEPHONE ENCOUNTER
Patient walked into the Recovery Clinic after hours stating he had reported at last visit with Jacqui Richardson that he had 23 Suboxone films left, but when he returned home, he discovered he only had 20 films left. Took last Suboxone film last night. Has refill at pharmacy, but per previous order, patient can not fill until 9/20/24. Patient requesting authorization to fill Suboxone rx today.    Jacqui Richardson out of office. Routing to covering provider.    Last Recovery Clinic appt: 9/9/24, 6-week follow up recommended  Next  appt: 10/18/24    [place]       Arabella Castellano RN on 9/18/2024 at 3:25 PM

## 2024-10-06 ENCOUNTER — HOSPITAL ENCOUNTER (EMERGENCY)
Facility: CLINIC | Age: 24
Discharge: HOME | End: 2024-10-06
Attending: EMERGENCY MEDICINE | Admitting: EMERGENCY MEDICINE
Payer: COMMERCIAL

## 2024-10-06 VITALS
DIASTOLIC BLOOD PRESSURE: 74 MMHG | RESPIRATION RATE: 16 BRPM | OXYGEN SATURATION: 99 % | HEART RATE: 100 BPM | TEMPERATURE: 98.6 F | SYSTOLIC BLOOD PRESSURE: 117 MMHG

## 2024-10-06 DIAGNOSIS — F11.20 OPIOID USE DISORDER, SEVERE, DEPENDENCE (H): ICD-10-CM

## 2024-10-06 DIAGNOSIS — F11.90 OPIOID USE DISORDER: ICD-10-CM

## 2024-10-06 PROCEDURE — G2213 INITIAT MED ASSIST TX IN ER: HCPCS | Performed by: EMERGENCY MEDICINE

## 2024-10-06 PROCEDURE — 250N000012 HC RX MED GY IP 250 OP 636 PS 637: Performed by: EMERGENCY MEDICINE

## 2024-10-06 PROCEDURE — 99284 EMERGENCY DEPT VISIT MOD MDM: CPT | Performed by: EMERGENCY MEDICINE

## 2024-10-06 RX ORDER — BUPRENORPHINE AND NALOXONE 2; .5 MG/1; MG/1
1 FILM, SOLUBLE BUCCAL; SUBLINGUAL ONCE
Status: COMPLETED | OUTPATIENT
Start: 2024-10-06 | End: 2024-10-06

## 2024-10-06 RX ORDER — BUPRENORPHINE AND NALOXONE 2; .5 MG/1; MG/1
1 FILM, SOLUBLE BUCCAL; SUBLINGUAL 2 TIMES DAILY
Qty: 30 FILM | Refills: 0 | Status: SHIPPED | OUTPATIENT
Start: 2024-10-06 | End: 2024-10-18

## 2024-10-06 RX ADMIN — BUPRENORPHINE AND NALOXONE 1 FILM: 2; .5 FILM, SOLUBLE BUCCAL; SUBLINGUAL at 16:16

## 2024-10-06 ASSESSMENT — ACTIVITIES OF DAILY LIVING (ADL): ADLS_ACUITY_SCORE: 35

## 2024-10-06 NOTE — ED TRIAGE NOTES
Triage Assessment (Adult)       Row Name 10/06/24 1541          Triage Assessment    Airway WDL WDL        Respiratory WDL    Respiratory WDL WDL        Skin Circulation/Temperature WDL    Skin Circulation/Temperature WDL WDL        Cardiac WDL    Cardiac WDL WDL        Peripheral/Neurovascular WDL    Peripheral Neurovascular WDL WDL        Cognitive/Neuro/Behavioral WDL    Cognitive/Neuro/Behavioral WDL WDL

## 2024-10-06 NOTE — ED PROVIDER NOTES
ED Provider Note  [facility]      History     Chief Complaint   Patient presents with    Medication Refill     HPI  Reji Goldstein is a 24 year old male with a history of opioid use disorder on Suboxone who presents to the emergency department for medication refill of his Suboxone.  Patient states he last took his Suboxone yesterday.  He reports some myalgias consistent with withdrawal.  He is requesting a dose of Suboxone here in the emergency department and a refill.  He states he has a follow-up scheduled in the recovery clinic and less than 2 weeks.  He denies any recent illness or medical concerns.  He denies any depression or suicide ideation.  He denies any acute mental health concerns.    Past Medical History  Past Medical History:   Diagnosis Date    Substance abuse (H)      No past surgical history on file.  buprenorphine HCl-naloxone HCl (SUBOXONE) 2-0.5 MG per film  buprenorphine HCl-naloxone HCl (SUBOXONE) 2-0.5 MG per film  docusate sodium (DSS) 100 MG capsule  lubiprostone (AMITIZA) 24 MCG capsule  naloxone (NARCAN) 4 MG/0.1ML nasal spray  polyethylene glycol (MIRALAX) 17 GM/Dose powder  varenicline (CHANTIX FRANSISCA) 0.5 MG X 11 & 1 MG X 42 tablet      No Known Allergies  Family History  No family history on file.  Social History   Social History     Tobacco Use    Smoking status: Some Days     Types: Vaping Device     Passive exposure: Never    Tobacco comments:     vape   Vaping Use    Vaping status: Every Day    Substances: Nicotine, Flavoring    Devices: Disposable   Substance Use Topics    Alcohol use: Not Currently    Drug use: Not Currently     Comment: last use 12/2021, takes suboxone      A medically appropriate review of systems was performed with pertinent positives and negatives noted in the HPI, and all other systems negative.    Physical Exam   BP: 131/69  Pulse: 81  Temp: 97.9  F (36.6  C)  Resp: 16  SpO2: 97 %  Physical Exam  Vitals and nursing note reviewed.    Constitutional:       Appearance: Normal appearance.   HENT:      Head: Normocephalic.      Mouth/Throat:      Mouth: Mucous membranes are moist.   Eyes:      Pupils: Pupils are equal, round, and reactive to light.   Cardiovascular:      Rate and Rhythm: Normal rate.   Pulmonary:      Effort: Pulmonary effort is normal.   Abdominal:      General: Abdomen is flat.   Musculoskeletal:         General: Normal range of motion.   Skin:     General: Skin is warm and dry.   Neurological:      General: No focal deficit present.      Mental Status: He is alert.      Motor: No weakness.      Coordination: Coordination normal.      Gait: Gait normal.   Psychiatric:         Mood and Affect: Mood normal.           ED Course, Procedures, & Data      Procedures             Medications   buprenorphine HCl-naloxone HCl (SUBOXONE) 2-0.5 MG per film 1 Film (1 Film Sublingual $Given 10/6/24 1616)     Reviewed emergency department encounter from 9/8/2024 for Suboxone refill.  Reviewed emergency department encounter from 6/28/2024 for opioid withdrawal.  Reviewed urine toxicology screen from 9/9/2024-positive for buprenorphine     No results found for any visits on 10/06/24.  Medications   buprenorphine HCl-naloxone HCl (SUBOXONE) 2-0.5 MG per film 1 Film (1 Film Sublingual $Given 10/6/24 1616)     Labs Ordered and Resulted from Time of ED Arrival to Time of ED Departure - No data to display  No orders to display          Critical care was not performed.     Medical Decision Making  The patient's presentation was of moderate complexity (a chronic illness mild to moderate exacerbation, progression, or side effect of treatment).    The patient's evaluation involved:  review of external note(s) from 1 sources (see separate area of note for details)  review of 1 test result(s) ordered prior to this encounter (see separate area of note for details)    The patient's management necessitated moderate risk (prescription drug management including  medications given in the ED).    Assessment & Plan    24 year old male with history of opioid use disorder to the emergency department seeking refill of his Suboxone.  Patient last had a dose of Suboxone yesterday and is complaining of withdrawal symptoms.  He has a normal physical exam.  He was given a dose of Suboxone.  Emergency Department with improvement in his symptoms.  15-day supply of Suboxone prescribed.  He has follow-up scheduled within that timeframe at the recovery clinic.  Return precautions provided.    I have reviewed the nursing notes. I have reviewed the findings, diagnosis, plan and need for follow up with the patient.    Discharge Medication List as of 10/6/2024  4:53 PM          Final diagnoses:   Opioid use disorder, severe, dependence (H)     Chart documentation was completed with Dragon voice-recognition software. Even though reviewed, this chart may still contain some grammatical, spelling, and word errors.       [facility] EMERGENCY DEPARTMENT  10/6/2024     Loi Allan MD  10/07/24 1822

## 2024-10-18 ENCOUNTER — OFFICE VISIT (OUTPATIENT)
Dept: BEHAVIORAL HEALTH | Facility: CLINIC | Age: 24
End: 2024-10-18
Payer: COMMERCIAL

## 2024-10-18 VITALS — DIASTOLIC BLOOD PRESSURE: 76 MMHG | OXYGEN SATURATION: 100 % | HEART RATE: 81 BPM | SYSTOLIC BLOOD PRESSURE: 120 MMHG

## 2024-10-18 DIAGNOSIS — T40.2X5A THERAPEUTIC OPIOID-INDUCED CONSTIPATION (OIC): ICD-10-CM

## 2024-10-18 DIAGNOSIS — F11.20 OPIOID USE DISORDER, SEVERE, DEPENDENCE (H): ICD-10-CM

## 2024-10-18 DIAGNOSIS — K59.03 THERAPEUTIC OPIOID-INDUCED CONSTIPATION (OIC): ICD-10-CM

## 2024-10-18 LAB
AMPHETAMINE QUAL URINE POCT: NEGATIVE
BARBITURATE QUAL URINE POCT: NEGATIVE
BENZODIAZEPINE QUAL URINE POCT: NEGATIVE
BUPRENORPHINE QUAL URINE POCT: ABNORMAL
COCAINE QUAL URINE POCT: NEGATIVE
CREAT UR-MCNC: 100 MG/DL
CREATININE QUAL URINE POCT: ABNORMAL
INTERNAL QC QUAL URINE POCT: ABNORMAL
MDMA QUAL URINE POCT: NEGATIVE
METHADONE QUAL URINE POCT: NEGATIVE
METHAMPHETAMINE QUAL URINE POCT: NEGATIVE
OPIATE QUAL URINE POCT: NEGATIVE
OXYCODONE QUAL URINE POCT: NEGATIVE
PH QUAL URINE POCT: ABNORMAL
PHENCYCLIDINE QUAL URINE POCT: NEGATIVE
POCT KIT EXPIRATION DATE: ABNORMAL
POCT KIT LOT NUMBER: ABNORMAL
SPECIFIC GRAVITY POCT: 1
TEMPERATURE URINE POCT: ABNORMAL
THC QUAL URINE POCT: NEGATIVE

## 2024-10-18 PROCEDURE — G2211 COMPLEX E/M VISIT ADD ON: HCPCS | Performed by: FAMILY MEDICINE

## 2024-10-18 PROCEDURE — 99214 OFFICE O/P EST MOD 30 MIN: CPT | Performed by: FAMILY MEDICINE

## 2024-10-18 PROCEDURE — G0481 DRUG TEST DEF 8-14 CLASSES: HCPCS | Mod: 59 | Performed by: FAMILY MEDICINE

## 2024-10-18 PROCEDURE — 80305 DRUG TEST PRSMV DIR OPT OBS: CPT | Performed by: FAMILY MEDICINE

## 2024-10-18 RX ORDER — BUPRENORPHINE AND NALOXONE 2; .5 MG/1; MG/1
FILM, SOLUBLE BUCCAL; SUBLINGUAL
Qty: 75 FILM | Refills: 0 | Status: SHIPPED | OUTPATIENT
Start: 2024-10-18 | End: 2024-11-08

## 2024-10-18 ASSESSMENT — PATIENT HEALTH QUESTIONNAIRE - PHQ9: SUM OF ALL RESPONSES TO PHQ QUESTIONS 1-9: 0

## 2024-10-18 NOTE — NURSING NOTE
[facility]      Rooming information:    Approximate last use of full opioid agonist: 12/2021  Taking buprenorphine? Yes: suboxone  How much per day? 2mg BID w/ 0.5mg  Number of buprenorphine films/tablets remaining currently: none  Side effects related to buprenorphine (constipation, dry mouth, sedation?) No   Narcan currently available: Yes  Other recent substance use:    Denies  NICOTINE-Yes: vape w/ nicotine  If using nicotine, ready to quit? No    Point of care urine drug screen positive for:  Lab Results   Component Value Date    BUP Screen Positive (A) 10/18/2024    BZO Negative 10/18/2024    BAR Negative 10/18/2024    RAMON Negative 10/18/2024    MAMP Negative 10/18/2024    AMP Negative 10/18/2024    MDMA Negative 10/18/2024    MTD Negative 10/18/2024    LPY486 Negative 10/18/2024    OXY Negative 10/18/2024    PCP Negative 10/18/2024    THC Negative 10/18/2024    TEMP 90 F 10/18/2024    SGPOCT 1.005 10/18/2024       *POC urine drug screen does not screen for Fentanyl      Depression Response    Patient completed the PHQ-9 assessment for depression and scored >9? No  Question 9 on the PHQ-9 was positive for suicidality? No  Does patient have current mental health provider? No  C-SSRS screener risk level.       Is this a virtual visit? No    I personally notified the following: n/a          10/18/2024     3:00 PM   PHQ Assesment Total Score(s)   PHQ-9 Score 0         Housing needs: with parents    Insurance: active    Current legal issues: none    Contact information up to date? On file    3rd Party Involvement n/a (please obtain ELBA if pt would like to include)    Christian Hernandez MA  October 18, 2024  3:18 PM

## 2024-10-18 NOTE — PROGRESS NOTES
[facility] Follow Up    ASSESSMENT/PLAN                                                      1. Opioid use disorder, severe, dependence (H)  Overall he is reporting symptoms are well controlled.  I discussed how it appears he is running out of medication early.  Further discussion it seems there are days he is needing more than 4mg of buprenorphine so we are going to adjust script to allow for 5mg daily.  Reviewed that goal of Suboxone is to avoid withdrawal and cravings so that is how we determine appropriate dose.  He was appreciative.  Encouraged him to reach out to clinic between visits with concerns.    - Drugs of Abuse Screen Urine (POC CUPS) POCT  - Drug Confirmation Panel Urine with Creat - lab collect; Future  - buprenorphine HCl-naloxone HCl (SUBOXONE) 2-0.5 MG per film; Place 1 Film under the tongue 2 times daily AND 0.5 Film daily.  Dispense: 75 Film; Refill: 0  - Drug Confirmation Panel Urine with Creat - lab collect    2. Therapeutic opioid-induced constipation (OIC)  Reports symptoms are improving.  Continue Amitiza, docusate, and miralax.       Return in about 4 weeks (around 11/15/2024) for Follow up, in person.      Patient counseling completed today:  Discussed mechanism of action, potential risks/benefits/side effects of medications and other recommendations above.     Discussed risk of precipitated withdrawal with initiation of buprenorphine in the presence of full opioid agonists.    Reviewed directions for initiation of buprenorphine to reduce risk of precipitated withdrawal and maximize efficacy.    Harm reduction counseling including never use alone, availability of naloxone, risks associated with concurrent use of opioids and benzodiazepines, alcohol, or other sedatives, safer administration as applicable.  Discussed importance of avoiding isolation, building a network of supportive relationships, avoiding people/places/things associated with past use to reduce  risk of relapse; including motivational interviewing regarding psychosocial interventions.   SUBJECTIVE                                                          CC/HPI:  Reji Goldstein is a 23 year old male with PMH nicotine use disorder and opioid use disorder on buprenorphine who presents to the Recovery Clinic for return visit.t.      Brief History:  Reji Goldstein was first seen in Recovery Clinic on 05/15/23. They were referred by staff from [facility] ED after pt was seen there in 3/2023 requesting rx for buprenorphine.   Patient's reasons for seeking treatment on this date included wanting to continue taper off buprenorphine.     Pt started buprenorphine for treatment of OUD through [facility] in 12/2021.  Max dose 16mg/day.  He began tapering dose on his own in Fall, 2022.  He has continued to decrease dose since that time.  He denies return to use of fentanyl since 12/2021.    Recommendations last visit: 9/9/24  1. Opioid use disorder, severe, dependence (H)  -controlled.  No use, no cravings, no withdrawal symptoms  - Drugs of Abuse Screen Urine (POC CUPS) POCT  - Drug Confirmation Panel Urine with Creat - lab collect; Future  - buprenorphine HCl-naloxone HCl (SUBOXONE) 2-0.5 MG per film; Place 1 Film under the tongue 2 times daily.  Dispense: 60 Film; Refill: 0  -confirmed access to narcan  -Counseling provided regarding   Opioid warning reviewed.    Risk of overdose following a period of abstinence due to decrease tolerance was discussed including risk of death.                Strongly recommended abstain from alcohol, benzodiazepines, THC, opioids and other drugs of abuse.                Increased risk of return to opioid use after use of these substances discussed.         Increased risk of overdose/death with use of other substances particularly benzodiazepines/alcohol reviewed.  -follow up October 18.  Next fill scheduled to fill 9/20/24      3. Therapeutic opioid-induced constipation (OIC)  -needs  improvement  -take mirilax on a consistent basis  - lubiprostone (AMITIZA) 24 MCG capsule; Take 1 capsule (24 mcg) by mouth 2 times daily (with meals).  Dispense: 60 capsule; Refill: 1  -increase water and fibrous fruits and vegetables such as kiwi, prunes, avocado    10/18/24 HPI:  Has been taking Suboxone 4-1mg daily (divides in 2+ doses)- further discussion, some days takes extra due to feeling withdrawal symptoms when working overnight (chills, sweats - relieved by Suboxone)  Constipation improving  No opioid cravings or use  Last use remains 2021  Mood is stable, sleeping well  Working 2 jobs    Substance Use History :  Opioids:   Age at first use: 19  Current use: substance: Perc 30's ; quantity stated never counted but 10 pills would be the max he would use daily ; route: inhalation  ; timing of last use: 2021;                 IV drug use: No   History of overdose: Yes: one episode in a vehicle with friends 7/2020 x2  Previous residential or outpatient treatments for addiction : Yes: [facility] in  in patient   Previous medication treatments for addiction: Yes: Currently is taking Suboxone   Longest period of sobriety: 2021 to present  Medical complications related to substance use: denies   Hepatitis C: no record of testing  HIV: 2019 HIV ag/ab nonreactive     Other Addiction History:  Stimulants   Denies   Sedatives/hypnotics/anxiolytics:   Denies   Alcohol:   Denies   Nicotine:   H/o vaping  Cannabis:   Denies   Hallucinogens/Dissociatives:   Denies   Eating disorder:  Denies   Gambling:              Denies       PAST PSYCHIATRIC HISTORY:  Diagnoses- denies   Suicide Attempts: No   Hospitalizations: No         2024    10:00 AM 2024     2:00 PM 10/18/2024     3:00 PM   PHQ   PHQ-9 Total Score 0 0 0   Q9: Thoughts of better off dead/self-harm past 2 weeks Not at all Not at all Not at all       Social History  Housing status: with family father and 8 siblings (pt's mother  in  [place] 2019, hit by impaired )  Employment status: full time [employer], working early morning  Relationship status: Single  Children: no children  Legal: denies       Labs discussed with patient?  Yes      [place] Prescription Drug Monitoring Program Reviewed:  Yes    Medications:  Current Outpatient Medications   Medication Sig Dispense Refill    buprenorphine HCl-naloxone HCl (SUBOXONE) 2-0.5 MG per film Place 1 Film under the tongue 2 times daily AND 0.5 Film daily. 75 Film 0    docusate sodium (DSS) 100 MG capsule Take 1 capsule (100 mg) by mouth 2 times daily as needed for constipation 60 capsule 1    lubiprostone (AMITIZA) 24 MCG capsule Take 1 capsule (24 mcg) by mouth 2 times daily (with meals). 60 capsule 1    naloxone (NARCAN) 4 MG/0.1ML nasal spray Spray 1 spray (4 mg) into one nostril alternating nostrils as needed for opioid reversal every 2-3 minutes until assistance arrives 0.2 mL 0    polyethylene glycol (MIRALAX) 17 GM/Dose powder Take 17 g (1 Capful) by mouth daily as needed for constipation 578 g 2    varenicline (CHANTIX FRANSISCA) 0.5 MG X 11 & 1 MG X 42 tablet Take 0.5 mg tab daily for 3 days, THEN 0.5 mg tab twice daily for 4 days, THEN 1 mg twice daily. 53 tablet 0     No current facility-administered medications for this visit.       No Known Allergies    PMH, PSH, FamHx reviewed      OBJECTIVE                                                      /76   Pulse 81   SpO2 100%     Physical Exam  Vitals and nursing note reviewed.   Constitutional:       General: He is not in acute distress.     Appearance: Normal appearance. He is not ill-appearing or diaphoretic.   HENT:      Nose: No congestion or rhinorrhea.   Eyes:      General: No scleral icterus.  Cardiovascular:      Rate and Rhythm: Normal rate.   Pulmonary:      Effort: Pulmonary effort is normal. No respiratory distress.   Skin:     Coloration: Skin is not jaundiced or pale.   Neurological:      General: No focal deficit  present.      Mental Status: He is alert.   Psychiatric:         Mood and Affect: Mood normal.         Behavior: Behavior normal.         Thought Content: Thought content normal.         Judgment: Judgment normal.         Labs:    UDS:    Lab Results   Component Value Date    BUP Screen Positive (A) 10/18/2024    BZO Negative 10/18/2024    BAR Negative 10/18/2024    RAMON Negative 10/18/2024    MAMP Negative 10/18/2024    AMP Negative 10/18/2024    MDMA Negative 10/18/2024    MTD Negative 10/18/2024    [id number] Negative 10/18/2024    OXY Negative 10/18/2024    PCP Negative 10/18/2024    THC Negative 10/18/2024    TEMP 90 F 10/18/2024    SGPOCT 1.005 10/18/2024     *POC urine drug screen does not screen for Fentanyl      Recent Results (from the past 240 hour(s))   Drugs of Abuse Screen Urine (POC CUPS) POCT    Collection Time: 10/18/24  3:27 PM   Result Value Ref Range    POCT Kit Lot Number [id number]     POCT Kit Expiration Date 05/15/2026     Temperature Urine POCT 90 F 90 F, 92 F, 94 F, 96 F, 98 F, 100 F    Specific [place] POCT 1.005 1.005, 1.015, 1.025    pH Qual Urine POCT 5 pH 4 pH, 5 pH, 7 pH, 9 pH    Creatinine Qual Urine POCT 50 mg/dL 20 mg/dL, 50 mg/dL, 100 mg/dL, 200 mg/dL    Internal QC Qual Urine POCT Valid Valid    Amphetamine Qual Urine POCT Negative Negative    Barbiturate Qual Urine POCT Negative Negative    Buprenorphine Qual Urine POCT Screen Positive (A) Negative    Benzodiazepine Qual Urine POCT Negative Negative    Cocaine Qual Urine POCT Negative Negative    Methamphetamine Qual Urine POCT Negative Negative    MDMA Qual Urine POCT Negative Negative    Methadone Qual Urine POCT Negative Negative    Opiate Qual Urine POCT Negative Negative    Oxycodone Qual Urine POCT Negative Negative    Phencyclidine Qual Urine POCT Negative Negative    THC Qual Urine POCT Negative Negative          Continued Complex Management  The longitudinal plan of care for Opioid Use Disorder (OUD) was addressed during  this visit. Due to the added complexity in care, I will continue to support Reji in the subsequent management and with ongoing continuity of care.    Mena Alcala, [facility]  [address] [place] [id number] [phone]

## 2024-10-22 LAB
BUPRENORPHINE UR CFM-MCNC: 73 NG/ML
BUPRENORPHINE/CREAT UR: 73 NG/MG {CREAT}
NALOXONE UR CFM-MCNC: 168 NG/ML
NALOXONE: 168 NG/MG {CREAT}
NORBUPRENORPHINE UR CFM-MCNC: 313 NG/ML
NORBUPRENORPHINE/CREAT UR: 313 NG/MG {CREAT}

## 2024-11-08 ENCOUNTER — OFFICE VISIT (OUTPATIENT)
Dept: BEHAVIORAL HEALTH | Facility: CLINIC | Age: 24
End: 2024-11-08
Payer: COMMERCIAL

## 2024-11-08 VITALS — SYSTOLIC BLOOD PRESSURE: 110 MMHG | HEART RATE: 76 BPM | DIASTOLIC BLOOD PRESSURE: 68 MMHG

## 2024-11-08 DIAGNOSIS — F11.20 OPIOID USE DISORDER, SEVERE, DEPENDENCE (H): Primary | ICD-10-CM

## 2024-11-08 DIAGNOSIS — F17.200 NICOTINE USE DISORDER: ICD-10-CM

## 2024-11-08 PROCEDURE — 80305 DRUG TEST PRSMV DIR OPT OBS: CPT | Performed by: FAMILY MEDICINE

## 2024-11-08 PROCEDURE — G2211 COMPLEX E/M VISIT ADD ON: HCPCS | Performed by: FAMILY MEDICINE

## 2024-11-08 PROCEDURE — 99214 OFFICE O/P EST MOD 30 MIN: CPT | Performed by: FAMILY MEDICINE

## 2024-11-08 RX ORDER — BUPRENORPHINE AND NALOXONE 4; 1 MG/1; MG/1
FILM, SOLUBLE BUCCAL; SUBLINGUAL
Qty: 45 FILM | Refills: 0 | Status: SHIPPED | OUTPATIENT
Start: 2024-11-08

## 2024-11-08 RX ORDER — BUPRENORPHINE AND NALOXONE 2; .5 MG/1; MG/1
FILM, SOLUBLE BUCCAL; SUBLINGUAL
Qty: 75 FILM | Refills: 0 | Status: SHIPPED | OUTPATIENT
Start: 2024-11-15 | End: 2024-12-04

## 2024-11-08 ASSESSMENT — PATIENT HEALTH QUESTIONNAIRE - PHQ9: SUM OF ALL RESPONSES TO PHQ QUESTIONS 1-9: 0

## 2024-11-08 NOTE — NURSING NOTE
[facility]      Rooming information:    Approximate last use of full opioid agonist: 12/2021  Taking buprenorphine? Yes: suboxone  How much per day? 4.5  Number of buprenorphine films/tablets remaining currently: some  Side effects related to buprenorphine (constipation, dry mouth, sedation?) No   Narcan currently available: Yes  Other recent substance use:    Denies  NICOTINE-Yes: vape  If using nicotine, ready to quit? No    Point of care urine drug screen positive for:  Lab Results   Component Value Date    BUP Screen Positive (A) 11/08/2024    BZO Negative 11/08/2024    BAR Negative 11/08/2024    RAMON Negative 11/08/2024    MAMP Negative 11/08/2024    AMP Negative 11/08/2024    MDMA Negative 11/08/2024    MTD Negative 11/08/2024    [id number] Negative 11/08/2024    OXY Negative 11/08/2024    PCP Negative 11/08/2024    THC Negative 11/08/2024    TEMP 90 F 11/08/2024    SGPOCT 1.025 11/08/2024       *POC urine drug screen does not screen for Fentanyl    Pregnancy Status       Depression Response    Patient completed the PHQ-9 assessment for depression and scored >9? No  Question 9 on the PHQ-9 was positive for suicidality? No  Does patient have current mental health provider? No  C-SSRS screener risk level.       Is this a virtual visit? No    I personally notified the following: DIAZ          11/8/2024     2:00 PM   PHQ Assesment Total Score(s)   PHQ-9 Score 0         Housing needs: with parents    Insurance: active    Current legal issues: none    Contact information up to date? On file    3rd Party Involvement no today (please obtain ELBA if pt would like to include)    Jyoti Mcdonough MA  November 8, 2024  2:00 PM

## 2024-11-08 NOTE — PROGRESS NOTES
[facility] Follow Up    ASSESSMENT/PLAN                                                      1. Opioid use disorder, severe, dependence (H)  ***  - Drugs of Abuse Screen Urine (POC CUPS) POCT         No follow-ups on file.      Patient counseling completed today:  Discussed mechanism of action, potential risks/benefits/side effects of medications and other recommendations above.     Discussed risk of precipitated withdrawal with initiation of buprenorphine in the presence of full opioid agonists.    Reviewed directions for initiation of buprenorphine to reduce risk of precipitated withdrawal and maximize efficacy.    Harm reduction counseling including never use alone, availability of naloxone, risks associated with concurrent use of opioids and benzodiazepines, alcohol, or other sedatives, safer administration as applicable.  Discussed importance of avoiding isolation, building a network of supportive relationships, avoiding people/places/things associated with past use to reduce risk of relapse; including motivational interviewing regarding psychosocial interventions.   SUBJECTIVE                                                          CC/HPI:  Reji Goldstein is a 23 year old male with PMH nicotine use disorder and opioid use disorder on buprenorphine who presents to the Recovery Clinic for return visit.t.      Brief History:  Reji Goldstein was first seen in Recovery Clinic on 05/15/23. They were referred by staff from [facility] ED after pt was seen there in 3/2023 requesting rx for buprenorphine.   Patient's reasons for seeking treatment on this date included wanting to continue taper off buprenorphine.     Pt started buprenorphine for treatment of OUD through [facility] in 12/2021.  Max dose 16mg/day.  He began tapering dose on his own in Fall, 2022.  He has continued to decrease dose since that time.  He denies return to use of fentanyl since 12/2021.    Recommendations last visit: 10/18/24  1.  Opioid use disorder, severe, dependence (H)  Overall he is reporting symptoms are well controlled.  I discussed how it appears he is running out of medication early.  Further discussion it seems there are days he is needing more than 4mg of buprenorphine so we are going to adjust script to allow for 5mg daily.  Reviewed that goal of Suboxone is to avoid withdrawal and cravings so that is how we determine appropriate dose.  He was appreciative.  Encouraged him to reach out to clinic between visits with concerns.    - Drugs of Abuse Screen Urine (POC CUPS) POCT  - Drug Confirmation Panel Urine with Creat - lab collect; Future  - buprenorphine HCl-naloxone HCl (SUBOXONE) 2-0.5 MG per film; Place 1 Film under the tongue 2 times daily AND 0.5 Film daily.  Dispense: 75 Film; Refill: 0  - Drug Confirmation Panel Urine with Creat - lab collect     2. Therapeutic opioid-induced constipation (OIC)  Reports symptoms are improving.  Continue Amitiza, docusate, and miralax.      11/08/24 HPI:  No concerns  Uncle is coming to visit, Reji is leaving for [place] this evening  Has 15 tablets left    Substance Use History :  Opioids:   Age at first use: 19  Current use: substance: Perc 30's ; quantity stated never counted but 10 pills would be the max he would use daily ; route: inhalation  ; timing of last use: 12/2021;                 IV drug use: No   History of overdose: Yes: one episode in a vehicle with friends 7/2020 x2  Previous residential or outpatient treatments for addiction : Yes: [facility] in 2021 in patient   Previous medication treatments for addiction: Yes: Currently is taking Suboxone   Longest period of sobriety: 12/2021 to present  Medical complications related to substance use: denies   Hepatitis C: no record of testing  HIV: 8/14/2019 HIV ag/ab nonreactive     Other Addiction History:  Stimulants   Denies   Sedatives/hypnotics/anxiolytics:   Denies   Alcohol:   Denies   Nicotine:   H/o vaping  Cannabis:    Denies   Hallucinogens/Dissociatives:   Denies   Eating disorder:  Denies   Gambling:              Denies       PAST PSYCHIATRIC HISTORY:  Diagnoses- denies   Suicide Attempts: No   Hospitalizations: No         2024     2:00 PM 10/18/2024     3:00 PM 2024     2:00 PM   PHQ   PHQ-9 Total Score 0 0 0   Q9: Thoughts of better off dead/self-harm past 2 weeks Not at all Not at all Not at all          Social History  Housing status: with family father and 8 siblings (pt's mother  in [place] 2019, hit by impaired )  Employment status: full time [employer], working early morning  Relationship status: Single  Children: no children  Legal: denies       Labs discussed with patient?  Yes      [place] Prescription Drug Monitoring Program Reviewed:  Yes    Medications:  Current Outpatient Medications   Medication Sig Dispense Refill    buprenorphine HCl-naloxone HCl (SUBOXONE) 2-0.5 MG per film Place 1 Film under the tongue 2 times daily AND 0.5 Film daily. 75 Film 0    docusate sodium (DSS) 100 MG capsule Take 1 capsule (100 mg) by mouth 2 times daily as needed for constipation 60 capsule 1    lubiprostone (AMITIZA) 24 MCG capsule Take 1 capsule (24 mcg) by mouth 2 times daily (with meals). 60 capsule 1    naloxone (NARCAN) 4 MG/0.1ML nasal spray Spray 1 spray (4 mg) into one nostril alternating nostrils as needed for opioid reversal every 2-3 minutes until assistance arrives 0.2 mL 0    polyethylene glycol (MIRALAX) 17 GM/Dose powder Take 17 g (1 Capful) by mouth daily as needed for constipation 578 g 2    varenicline (CHANTIX FRANSISCA) 0.5 MG X 11 & 1 MG X 42 tablet Take 0.5 mg tab daily for 3 days, THEN 0.5 mg tab twice daily for 4 days, THEN 1 mg twice daily. 53 tablet 0     No current facility-administered medications for this visit.       No Known Allergies    PMH, PSH, FamHx reviewed      OBJECTIVE                                                      /68   Pulse 76     Physical  Exam    Labs:    UDS:    Lab Results   Component Value Date    BUP Screen Positive (A) 11/08/2024    BZO Negative 11/08/2024    BAR Negative 11/08/2024    RAMON Negative 11/08/2024    MAMP Negative 11/08/2024    AMP Negative 11/08/2024    MDMA Negative 11/08/2024    MTD Negative 11/08/2024    [id number] Negative 11/08/2024    OXY Negative 11/08/2024    PCP Negative 11/08/2024    THC Negative 11/08/2024    TEMP 90 F 11/08/2024    SGPOCT 1.025 11/08/2024     *POC urine drug screen does not screen for Fentanyl      Recent Results (from the past 240 hours)   Drugs of Abuse Screen Urine (POC CUPS) POCT    Collection Time: 11/08/24  2:15 PM   Result Value Ref Range    POCT Kit Lot Number [id number]     POCT Kit Expiration Date 3469669     Temperature Urine POCT 90 F 90 F, 92 F, 94 F, 96 F, 98 F, 100 F    Specific [place] POCT 1.025 1.005, 1.015, 1.025    pH Qual Urine POCT 5 pH 4 pH, 5 pH, 7 pH, 9 pH    Creatinine Qual Urine POCT 100 mg/dL 20 mg/dL, 50 mg/dL, 100 mg/dL, 200 mg/dL    Internal QC Qual Urine POCT Valid Valid    Amphetamine Qual Urine POCT Negative Negative    Barbiturate Qual Urine POCT Negative Negative    Buprenorphine Qual Urine POCT Screen Positive (A) Negative    Benzodiazepine Qual Urine POCT Negative Negative    Cocaine Qual Urine POCT Negative Negative    Methamphetamine Qual Urine POCT Negative Negative    MDMA Qual Urine POCT Negative Negative    Methadone Qual Urine POCT Negative Negative    Opiate Qual Urine POCT Negative Negative    Oxycodone Qual Urine POCT Negative Negative    Phencyclidine Qual Urine POCT Negative Negative    THC Qual Urine POCT Negative Negative        Continued Complex Management  The longitudinal plan of care for {MELYSSA choices:[id number]} was addressed during this visit. Due to the added complexity in care, I will continue to support Reji in the subsequent management and with ongoing continuity of care.    Mena Alcala DO  [facility]  [address]  [place] [address]  [phone]

## 2024-12-06 ENCOUNTER — OFFICE VISIT (OUTPATIENT)
Dept: BEHAVIORAL HEALTH | Facility: CLINIC | Age: 24
End: 2024-12-06
Payer: COMMERCIAL

## 2024-12-06 VITALS — DIASTOLIC BLOOD PRESSURE: 72 MMHG | HEART RATE: 84 BPM | SYSTOLIC BLOOD PRESSURE: 112 MMHG

## 2024-12-06 DIAGNOSIS — F11.20 OPIOID USE DISORDER, SEVERE, DEPENDENCE (H): Primary | ICD-10-CM

## 2024-12-06 DIAGNOSIS — K59.03 THERAPEUTIC OPIOID-INDUCED CONSTIPATION (OIC): ICD-10-CM

## 2024-12-06 DIAGNOSIS — Z51.81 ENCOUNTER FOR MONITORING OPIOID MAINTENANCE THERAPY: ICD-10-CM

## 2024-12-06 DIAGNOSIS — Z79.891 ENCOUNTER FOR MONITORING OPIOID MAINTENANCE THERAPY: ICD-10-CM

## 2024-12-06 DIAGNOSIS — T40.2X5A THERAPEUTIC OPIOID-INDUCED CONSTIPATION (OIC): ICD-10-CM

## 2024-12-06 RX ORDER — BUPRENORPHINE AND NALOXONE 2; .5 MG/1; MG/1
1 FILM, SOLUBLE BUCCAL; SUBLINGUAL 3 TIMES DAILY
Qty: 90 FILM | Refills: 1 | Status: SHIPPED | OUTPATIENT
Start: 2024-12-06

## 2024-12-06 RX ORDER — LUBIPROSTONE 24 UG/1
24 CAPSULE ORAL 2 TIMES DAILY WITH MEALS
Qty: 60 CAPSULE | Refills: 11 | Status: SHIPPED | OUTPATIENT
Start: 2024-12-06

## 2024-12-06 ASSESSMENT — PATIENT HEALTH QUESTIONNAIRE - PHQ9: SUM OF ALL RESPONSES TO PHQ QUESTIONS 1-9: 0

## 2024-12-06 NOTE — PROGRESS NOTES
[facility] Follow Up    ASSESSMENT/PLAN                                                      1. Opioid use disorder, severe, dependence (H) (Primary)  Reporting control of symptoms.   Pt stating he would like to continue stable dosing at this time.   Continue buprenorphine 6mg/day  - Drugs of Abuse Screen Urine (POC CUPS) POCT  - buprenorphine HCl-naloxone HCl (SUBOXONE) 2-0.5 MG per film; Place 1 Film under the tongue 3 times daily.  Dispense: 90 Film; Refill: 1    2. Encounter for monitoring opioid maintenance therapy  - Drugs of Abuse Screen Urine (POC CUPS) POCT    3. Therapeutic opioid-induced constipation (OIC)  Continue Amitiza  - lubiprostone (AMITIZA) 24 MCG capsule; Take 1 capsule (24 mcg) by mouth 2 times daily (with meals).  Dispense: 60 capsule; Refill: 11    Return in about 2 months (around 2/6/2025).      Patient counseling completed today:  Discussed mechanism of action, potential risks/benefits/side effects of medications and other recommendations above.    Harm reduction counseling including never use alone, availability of naloxone, risks associated with concurrent use of opioids and benzodiazepines, alcohol, or other sedatives, safer administration as applicable.  Discussed importance of avoiding isolation, building a network of supportive relationships, avoiding people/places/things associated with past use to reduce risk of relapse; including motivational interviewing regarding psychosocial interventions.   SUBJECTIVE                                                          CC/HPI:  Reji Goldstein is a 24 year old male with PMH nicotine use disorder and opioid use disorder on buprenorphine who presents to the Recovery Clinic for return visit.      Brief History:  Reji Goldstein was first seen in Recovery Clinic on 05/15/23. They were referred by staff from [facility] ED after pt was seen there in 3/2023 requesting rx for buprenorphine.   Patient's reasons for seeking treatment  on this date included wanting to continue taper off buprenorphine.     Pt started buprenorphine for treatment of OUD through [facility] in 12/2021.  Max dose 16mg/day.  He began tapering dose on his own in Fall, 2022.  He has continued to decrease dose since that time.  He denies return to use of fentanyl since 12/2021.    12/6/24 HPI:  Pt returns about one month from his last visit.  He has continued to take buprenorphine daily.  Based on  and the fact he reports 1 remaining film currently, pt is taking 6mg/day.   He denies withdrawal symptoms, cravings, or return to use.  He would like to continue current dose of buprenorphine.  He takes Amitiza 1-2x/day which is working to address OIC.     Substance Use History :  Opioids:   Age at first use: 19  Current use: substance: Perc 30's ; quantity stated never counted but 10 pills would be the max he would use daily ; route: inhalation  ; timing of last use: 12/2021                 IV drug use: No   History of overdose: Yes: one episode in a vehicle with friends 7/2020 x2  Previous residential or outpatient treatments for addiction : Yes: [facility] in 2021 in patient   Previous medication treatments for addiction: Yes: Currently is taking Suboxone   Longest period of sobriety: 12/2021 to present  Medical complications related to substance use: denies   Hepatitis C: no record of testing  HIV: 8/14/2019 HIV ag/ab nonreactive     Other Addiction History:  Stimulants   Denies   Sedatives/hypnotics/anxiolytics:   Denies   Alcohol:   Denies   Nicotine:   H/o vaping  Cannabis:   Denies   Hallucinogens/Dissociatives:   Denies   Eating disorder:  Denies   Gambling:              Denies       PAST PSYCHIATRIC HISTORY:  Diagnoses- denies   Suicide Attempts: No   Hospitalizations: No         9/9/2024     2:00 PM 10/18/2024     3:00 PM 11/8/2024     2:00 PM   PHQ   PHQ-9 Total Score 0 0 0   Q9: Thoughts of better off dead/self-harm past 2 weeks Not at all Not at all Not at all           Social History  Housing status: with family father and 8 siblings (pt's mother  in MVC 2019, hit by impaired )  Employment status: full time [employer], [employer], working early morning  Relationship status: Single  Children: no children  Legal: jones         [place] Prescription Drug Monitoring Program Reviewed:  Yes  2024 1 Suboxone 4 Mg-1 Mg Sl Film 45.00 30 Ka Par [id number] Romel ([id number]) 0/0 6.00 mg Medicaid [place]   10/18/2024 10/18/2024 1 Suboxone 2 Mg-0.5 Mg Sl Film 75.00 30 Ka Par [id number] Romel ([id number]) 0/0 5.00 mg [place]   10/07/2024 10/06/2024 1 Suboxone 4 Mg-1 Mg Sl Film 15.00 15 Pa Anabaptism [id number] Romel ([id number]) 0/0 4.00 mg [place]   2024 1 Suboxone 2 Mg-0.5 Mg Sl Film 60.00 30 He Bat [id number] Romel ([id number])           Medications:  Current Outpatient Medications   Medication Sig Dispense Refill    buprenorphine HCl-naloxone HCl (SUBOXONE) 4-1 MG per film Place 1 Film under the tongue daily AND 0.5 Film every evening. 45 Film 0    docusate sodium (DSS) 100 MG capsule Take 1 capsule (100 mg) by mouth 2 times daily as needed for constipation 60 capsule 1    lubiprostone (AMITIZA) 24 MCG capsule Take 1 capsule (24 mcg) by mouth 2 times daily (with meals). 60 capsule 1    naloxone (NARCAN) 4 MG/0.1ML nasal spray Spray 1 spray (4 mg) into one nostril alternating nostrils as needed for opioid reversal every 2-3 minutes until assistance arrives 0.2 mL 0    polyethylene glycol (MIRALAX) 17 GM/Dose powder Take 17 g (1 Capful) by mouth daily as needed for constipation 578 g 2    varenicline (CHANTIX FRANSISCA) 0.5 MG X 11 & 1 MG X 42 tablet Take 0.5 mg tab daily for 3 days, THEN 0.5 mg tab twice daily for 4 days, THEN 1 mg twice daily. 53 tablet 0     No current facility-administered medications for this visit.       No Known Allergies    PMH, PSH, FamHx reviewed      OBJECTIVE                                                      /72 (BP Location: Left arm, Patient Position: Sitting,  Cuff Size: Adult Regular)   Pulse 84     Physical Exam  Vitals and nursing note reviewed.   Constitutional:       General: He is not in acute distress.  Eyes:      General: No scleral icterus.     Extraocular Movements: Extraocular movements intact.      Conjunctiva/sclera: Conjunctivae normal.   Pulmonary:      Effort: Pulmonary effort is normal.   Neurological:      General: No focal deficit present.      Mental Status: He is alert and oriented to person, place, and time.   Psychiatric:         Attention and Perception: Attention normal.         Mood and Affect: Mood normal.         Speech: Speech normal.         Behavior: Behavior normal. Behavior is cooperative.         Thought Content: Thought content normal.         Judgment: Judgment normal.         Labs:      *POC urine drug screen does not screen for Fentanyl      Recent Results (from the past 240 hours)   Drugs of Abuse Screen Urine (POC CUPS) POCT    Collection Time: 12/06/24  2:25 PM   Result Value Ref Range    POCT Kit Lot Number [id number]     POCT Kit Expiration Date 2026-07-14     Temperature Urine POCT 90 F 90 F, 92 F, 94 F, 96 F, 98 F, 100 F    Specific [place] POCT 1.025 1.005, 1.015, 1.025    pH Qual Urine POCT 5 pH 4 pH, 5 pH, 7 pH, 9 pH    Creatinine Qual Urine POCT 50 mg/dL 20 mg/dL, 50 mg/dL, 100 mg/dL, 200 mg/dL    Internal QC Qual Urine POCT Valid Valid    Amphetamine Qual Urine POCT Negative Negative    Barbiturate Qual Urine POCT Negative Negative    Buprenorphine Qual Urine POCT Screen Positive (A) Negative    Benzodiazepine Qual Urine POCT Negative Negative    Cocaine Qual Urine POCT Negative Negative    Methamphetamine Qual Urine POCT Negative Negative    MDMA Qual Urine POCT Negative Negative    Methadone Qual Urine POCT Negative Negative    Opiate Qual Urine POCT Negative Negative    Oxycodone Qual Urine POCT Negative Negative    Phencyclidine Qual Urine POCT Negative Negative    THC Qual Urine POCT Negative Negative         The  longitudinal plan of care for the diagnosis(es)/condition(s) as documented were addressed during this visit. Due to the added complexity in care, I will continue to support Reji in the subsequent management and with ongoing continuity of care.      Erica Padron MD  Addiction Medicine  [place] [id number] [address]  [phone]

## 2024-12-06 NOTE — NURSING NOTE
[facility]      Rooming information:    Approximate last use of full opioid agonist: 12/2021  Taking buprenorphine? Yes:   How much per day? 4.5  Number of buprenorphine films/tablets remaining currently: 1  Side effects related to buprenorphine (constipation, dry mouth, sedation?) No   Narcan currently available: Yes  Other recent substance use:    Denies  NICOTINE-Yes: vaping  If using nicotine, ready to quit? No    Point of care urine drug screen positive for:  Lab Results   Component Value Date    BUP Screen Positive (A) 12/06/2024    BZO Negative 12/06/2024    BAR Negative 12/06/2024    RAMON Negative 12/06/2024    MAMP Negative 12/06/2024    AMP Negative 12/06/2024    MDMA Negative 12/06/2024    MTD Negative 12/06/2024    [id number] Negative 12/06/2024    OXY Negative 12/06/2024    PCP Negative 12/06/2024    THC Negative 12/06/2024    TEMP 90 F 12/06/2024    SGPOCT 1.025 12/06/2024       *POC urine drug screen does not screen for Fentanyl    Depression Response    Patient completed the PHQ-9 assessment for depression and scored >9? No  Question 9 on the PHQ-9 was positive for suicidality? No  Does patient have current mental health provider? No  C-SSRS screener risk level.       Is this a virtual visit? No    I personally notified the following: n/a        12/6/2024     2:00 PM   PHQ Assesment Total Score(s)   PHQ-9 Score 0     Housing needs: stable    Insurance: active    Current legal issues: none    Contact information up to date? yes    3rd Party Involvement not today (please obtain ELBA if pt would like to include)    Manuela Dubon MA  December 6, 2024  2:17 PM     25-Oct-2019 09:01

## 2025-01-30 ENCOUNTER — OFFICE VISIT (OUTPATIENT)
Dept: BEHAVIORAL HEALTH | Facility: CLINIC | Age: 25
End: 2025-01-30
Payer: COMMERCIAL

## 2025-01-30 ENCOUNTER — TELEPHONE (OUTPATIENT)
Dept: BEHAVIORAL HEALTH | Facility: CLINIC | Age: 25
End: 2025-01-30

## 2025-01-30 VITALS — OXYGEN SATURATION: 100 % | HEART RATE: 74 BPM | DIASTOLIC BLOOD PRESSURE: 78 MMHG | SYSTOLIC BLOOD PRESSURE: 129 MMHG

## 2025-01-30 DIAGNOSIS — Z79.891 ENCOUNTER FOR MONITORING OPIOID MAINTENANCE THERAPY: ICD-10-CM

## 2025-01-30 DIAGNOSIS — F17.200 NICOTINE USE DISORDER: ICD-10-CM

## 2025-01-30 DIAGNOSIS — Z51.81 ENCOUNTER FOR MONITORING OPIOID MAINTENANCE THERAPY: ICD-10-CM

## 2025-01-30 DIAGNOSIS — F11.20 OPIOID USE DISORDER, SEVERE, DEPENDENCE (H): Primary | ICD-10-CM

## 2025-01-30 LAB
AMPHETAMINE QUAL URINE POCT: NEGATIVE
BARBITURATE QUAL URINE POCT: NEGATIVE
BENZODIAZEPINE QUAL URINE POCT: NEGATIVE
BUPRENORPHINE QUAL URINE POCT: ABNORMAL
COCAINE QUAL URINE POCT: NEGATIVE
CREAT UR-MCNC: 90 MG/DL
CREATININE QUAL URINE POCT: ABNORMAL
INTERNAL QC QUAL URINE POCT: ABNORMAL
MDMA QUAL URINE POCT: NEGATIVE
METHADONE QUAL URINE POCT: NEGATIVE
METHAMPHETAMINE QUAL URINE POCT: NEGATIVE
OPIATE QUAL URINE POCT: NEGATIVE
OXYCODONE QUAL URINE POCT: NEGATIVE
PH QUAL URINE POCT: ABNORMAL
PHENCYCLIDINE QUAL URINE POCT: NEGATIVE
POCT KIT EXPIRATION DATE: ABNORMAL
POCT KIT LOT NUMBER: ABNORMAL
SPECIFIC GRAVITY POCT: 1.02
TEMPERATURE URINE POCT: ABNORMAL
THC QUAL URINE POCT: NEGATIVE

## 2025-01-30 RX ORDER — BUPRENORPHINE AND NALOXONE 2; .5 MG/1; MG/1
1 FILM, SOLUBLE BUCCAL; SUBLINGUAL 3 TIMES DAILY
Qty: 90 FILM | Refills: 1 | Status: SHIPPED | OUTPATIENT
Start: 2025-01-30 | End: 2025-01-30

## 2025-01-30 RX ORDER — BUPRENORPHINE AND NALOXONE 4; 1 MG/1; MG/1
0.5 FILM, SOLUBLE BUCCAL; SUBLINGUAL 3 TIMES DAILY
Qty: 45 FILM | Refills: 1 | Status: SHIPPED | OUTPATIENT
Start: 2025-01-30

## 2025-01-30 ASSESSMENT — PATIENT HEALTH QUESTIONNAIRE - PHQ9: SUM OF ALL RESPONSES TO PHQ QUESTIONS 1-9: 0

## 2025-01-30 NOTE — NURSING NOTE
[facility]      Rooming information:    Approximate last use of full opioid agonist: 2021  Taking buprenorphine? Yes: suboxone  How much per day? 4..5  Number of buprenorphine films/tablets remaining currently: still have a few left  Side effects related to buprenorphine (constipation, dry mouth, sedation?) No   Narcan currently available: Yes  Other recent substance use:    Denies  NICOTINE-Yes: vape  If using nicotine, ready to quit? No    Point of care urine drug screen positive for:  Lab Results   Component Value Date    BUP Screen Positive (A) 01/30/2025    BZO Negative 01/30/2025    BAR Negative 01/30/2025    RAMON Negative 01/30/2025    MAMP Negative 01/30/2025    AMP Negative 01/30/2025    MDMA Negative 01/30/2025    MTD Negative 01/30/2025    [id number] Negative 01/30/2025    OXY Negative 01/30/2025    PCP Negative 01/30/2025    THC Negative 01/30/2025    TEMP 92 F 01/30/2025    SGPOCT 1.025 01/30/2025       *POC urine drug screen does not screen for Fentanyl      Depression Response    Patient completed the PHQ-9 assessment for depression and scored >9? No  Question 9 on the PHQ-9 was positive for suicidality? No  Does patient have current mental health provider? No  C-SSRS screener risk level.       Is this a virtual visit? No    I personally notified the following: visit provider          1/30/2025     1:00 PM   PHQ Assesment Total Score(s)   PHQ-9 Score 0         Housing needs: stable     Insurance: active     Current legal issues: none     Contact information up to date? yes     3rd Party Involvement not today (please obtain ELBA if pt would like to include)      Christian Hernandez MA  January 30, 2025  1:22 PM

## 2025-01-30 NOTE — TELEPHONE ENCOUNTER
Received a message from patient that script for Suboxone was not able to be filled as it is too soon. They are unable to force script through. Patient states he does not have enough medication to get to this date.     RN notified provider to review.     Bina Martinez RN on 1/30/2025 at 3:02 PM

## 2025-01-30 NOTE — PROGRESS NOTES
[facility] Follow Up    ASSESSMENT/PLAN                                                    1. Opioid use disorder, severe, dependence (H) (Primary)  Reporting control of symptoms.   Continue buprenorphine unchanged; rx for 4mg films so pt could fill rx today  Encouraged self care activities  - Drug Confirmation Panel Urine with Creat - lab collect; Future  - Drugs of Abuse Screen Urine (POC CUPS) POCT  - Drug Confirmation Panel Urine with Creat - lab collect  - buprenorphine HCl-naloxone HCl (SUBOXONE) 4-1 MG per film; Place 0.5 Film under the tongue 3 times daily.  Dispense: 45 Film; Refill: 1    2. Encounter for monitoring opioid maintenance therapy  - Drug Confirmation Panel Urine with Creat - lab collect; Future  - Drugs of Abuse Screen Urine (POC CUPS) POCT  - Drug Confirmation Panel Urine with Creat - lab collect    3. Nicotine use disorder  Not ready to quit at this time      Return in about 2 months (around 3/30/2025).      Patient counseling completed today:  Discussed mechanism of action, potential risks/benefits/side effects of medications and other recommendations above.    Harm reduction counseling including never use alone, availability of naloxone, risks associated with concurrent use of opioids and benzodiazepines, alcohol, or other sedatives, safer administration as applicable.  Discussed importance of avoiding isolation, building a network of supportive relationships, avoiding people/places/things associated with past use to reduce risk of relapse; including motivational interviewing regarding psychosocial interventions.   SUBJECTIVE                                                          CC/HPI:  Reji Goldstein is a 24 year old male with PMH nicotine use disorder and opioid use disorder on buprenorphine who presents to the Recovery Clinic for return visit.      Brief History:  Reji Goldstein was first seen in Recovery Clinic on 05/15/23. They were referred by staff from [facility]  ED after pt was seen there in 3/2023 requesting rx for buprenorphine.   Patient's reasons for seeking treatment on this date included wanting to continue taper off buprenorphine.     Pt started buprenorphine for treatment of OUD through [facility] in 12/2021.  Max dose 16mg/day.  He began tapering dose on his own in Fall, 2022.  He has continued to decrease dose since that time.  He denies return to use of fentanyl since 12/2021.  2024 decided to remain on 4-6mg buprenorphine/day vs continuing taper.       1/30/25 HPI:  Pt returns about 2 months from last visit on 12/6/24 as recommended.  He has continued to take buprenorphine 4.-6mg daily.  Denies cravings or return to use.   He quit [employer] due to his hours being cut.  He now has a job at [employer].    Enjoys playing soccer, plays occasionally indoor during winter months.       Substance Use History :  Opioids:   Age at first use: 19  Current use: substance: Perc 30's ; quantity stated never counted but 10 pills would be the max he would use daily ; route: inhalation  ; timing of last use: 12/2021                 IV drug use: No   History of overdose: Yes: one episode in a vehicle with friends 7/2020 x2  Previous residential or outpatient treatments for addiction : Yes: [facility] in 2021 in patient   Previous medication treatments for addiction: Yes: Currently is taking Suboxone   Longest period of sobriety: 12/2021 to present  Medical complications related to substance use: denies   Hepatitis C: no record of testing  HIV: 8/14/2019 HIV ag/ab nonreactive     Other Addiction History:  Stimulants   Denies   Sedatives/hypnotics/anxiolytics:   Denies   Alcohol:   Denies   Nicotine:   H/o vaping  Cannabis:   Denies   Hallucinogens/Dissociatives:   Denies   Eating disorder:  Denies   Gambling:              Denies       PAST PSYCHIATRIC HISTORY:  Diagnoses- denies   Suicide Attempts: No   Hospitalizations: No         11/8/2024     2:00 PM 12/6/2024     2:00 PM 1/30/2025     1:00  PM   PHQ   PHQ-9 Total Score 0 0 0   Q9: Thoughts of better off dead/self-harm past 2 weeks Not at all Not at all Not at all          Social History  Housing status: with family father and 8 siblings (pt's mother  in MVC , hit by impaired )  Employment status: works full time at [employer]  Relationship status: Single  Children: no children  Legal: denies         [place] Prescription Drug Monitoring Program Reviewed:  Yes  2025 1 Suboxone 2 Mg-0.5 Mg Sl Film 90.00 30 Li Vol [id number] Romel ([id number])  6.00 mg Medicaid [place]   2024 1 Suboxone 2 Mg-0.5 Mg Sl Film 90.00 30 Li Vol [id number] Romel ([id number])  6.00 mg Medicaid [place]       Medications:  Current Outpatient Medications   Medication Sig Dispense Refill    buprenorphine HCl-naloxone HCl (SUBOXONE) 2-0.5 MG per film Place 1 Film under the tongue 3 times daily. 90 Film 1    docusate sodium (DSS) 100 MG capsule Take 1 capsule (100 mg) by mouth 2 times daily as needed for constipation 60 capsule 1    lubiprostone (AMITIZA) 24 MCG capsule Take 1 capsule (24 mcg) by mouth 2 times daily (with meals). 60 capsule 11    naloxone (NARCAN) 4 MG/0.1ML nasal spray Spray 1 spray (4 mg) into one nostril alternating nostrils as needed for opioid reversal every 2-3 minutes until assistance arrives 0.2 mL 0    polyethylene glycol (MIRALAX) 17 GM/Dose powder Take 17 g (1 Capful) by mouth daily as needed for constipation 578 g 2     No current facility-administered medications for this visit.       No Known Allergies    PMH, PSH, FamHx reviewed      OBJECTIVE                                                      /78   Pulse 74   SpO2 100%     Physical Exam  Vitals and nursing note reviewed.   Constitutional:       General: He is not in acute distress.  Eyes:      General: No scleral icterus.     Extraocular Movements: Extraocular movements intact.      Conjunctiva/sclera: Conjunctivae normal.   Pulmonary:      Effort: Pulmonary effort is  normal.   Neurological:      General: No focal deficit present.      Mental Status: He is alert and oriented to person, place, and time.   Psychiatric:         Attention and Perception: Attention normal.         Mood and Affect: Mood normal.         Speech: Speech normal.         Behavior: Behavior normal. Behavior is cooperative.         Thought Content: Thought content normal.         Judgment: Judgment normal.         Labs:      *POC urine drug screen does not screen for Fentanyl      Recent Results (from the past 240 hours)   Drugs of Abuse Screen Urine (POC CUPS) POCT    Collection Time: 01/30/25  1:47 PM   Result Value Ref Range    POCT Kit Lot Number [id number]     POCT Kit Expiration Date 08/05/2026     Temperature Urine POCT 92 F 90 F, 92 F, 94 F, 96 F, 98 F, 100 F    Specific [place] POCT 1.025 1.005, 1.015, 1.025    pH Qual Urine POCT 5 pH 4 pH, 5 pH, 7 pH, 9 pH    Creatinine Qual Urine POCT 20 mg/dL 20 mg/dL, 50 mg/dL, 100 mg/dL, 200 mg/dL    Internal QC Qual Urine POCT Valid Valid    Amphetamine Qual Urine POCT Negative Negative    Barbiturate Qual Urine POCT Negative Negative    Buprenorphine Qual Urine POCT Screen Positive (A) Negative    Benzodiazepine Qual Urine POCT Negative Negative    Cocaine Qual Urine POCT Negative Negative    Methamphetamine Qual Urine POCT Negative Negative    MDMA Qual Urine POCT Negative Negative    Methadone Qual Urine POCT Negative Negative    Opiate Qual Urine POCT Negative Negative    Oxycodone Qual Urine POCT Negative Negative    Phencyclidine Qual Urine POCT Negative Negative    THC Qual Urine POCT Negative Negative           The longitudinal plan of care for the diagnosis(es)/condition(s) as documented were addressed during this visit. Due to the added complexity in care, I will continue to support Reji in the subsequent management and with ongoing continuity of care.      Erica Padron MD  Addiction Medicine  [place] [id number]  [address]  [place] [address]  [phone]

## 2025-02-04 LAB
BUPRENORPHINE UR CFM-MCNC: 79 NG/ML
BUPRENORPHINE/CREAT UR: 88 NG/MG {CREAT}
NALOXONE UR CFM-MCNC: 423 NG/ML
NALOXONE: 470 NG/MG {CREAT}
NORBUPRENORPHINE UR CFM-MCNC: 210 NG/ML
NORBUPRENORPHINE/CREAT UR: 233 NG/MG {CREAT}

## 2025-03-15 ENCOUNTER — HOSPITAL ENCOUNTER (EMERGENCY)
Facility: CLINIC | Age: 25
Discharge: HOME | End: 2025-03-15
Attending: FAMILY MEDICINE | Admitting: FAMILY MEDICINE
Payer: COMMERCIAL

## 2025-03-15 VITALS
HEART RATE: 75 BPM | TEMPERATURE: 98.2 F | SYSTOLIC BLOOD PRESSURE: 153 MMHG | RESPIRATION RATE: 16 BRPM | OXYGEN SATURATION: 99 % | DIASTOLIC BLOOD PRESSURE: 73 MMHG

## 2025-03-15 DIAGNOSIS — F11.20 UNCOMPLICATED OPIOID DEPENDENCE (H): ICD-10-CM

## 2025-03-15 PROCEDURE — 99283 EMERGENCY DEPT VISIT LOW MDM: CPT | Performed by: FAMILY MEDICINE

## 2025-03-15 RX ORDER — BUPRENORPHINE AND NALOXONE 8; 2 MG/1; MG/1
0.5 FILM, SOLUBLE BUCCAL; SUBLINGUAL DAILY
Qty: 9 FILM | Refills: 0 | Status: SHIPPED | OUTPATIENT
Start: 2025-03-15

## 2025-03-15 ASSESSMENT — ACTIVITIES OF DAILY LIVING (ADL)
ADLS_ACUITY_SCORE: 41

## 2025-03-15 ASSESSMENT — COLUMBIA-SUICIDE SEVERITY RATING SCALE - C-SSRS
6. HAVE YOU EVER DONE ANYTHING, STARTED TO DO ANYTHING, OR PREPARED TO DO ANYTHING TO END YOUR LIFE?: NO
2. HAVE YOU ACTUALLY HAD ANY THOUGHTS OF KILLING YOURSELF IN THE PAST MONTH?: NO
1. IN THE PAST MONTH, HAVE YOU WISHED YOU WERE DEAD OR WISHED YOU COULD GO TO SLEEP AND NOT WAKE UP?: NO

## 2025-03-15 NOTE — ED TRIAGE NOTES
Patient requesting suboxone. Took half dose this AM, normally takes 4mg.    Initially came to recovery clinic but it is closed today

## 2025-03-15 NOTE — DISCHARGE INSTRUCTIONS
Discharge to home with Suboxone prescription take one half film per day follow-up with recovery clinic

## 2025-03-15 NOTE — ED PROVIDER NOTES
[facility] [place], [place] EMERGENCY DEPARTMENT ([facility])    3/15/25      ED PROVIDER NOTE    History     Chief Complaint   Patient presents with    Medication Refill     HPI  Reji Goldstein is a 24 year old male with a past medical history of opioid use disorder who presents to the emergency department for suboxone refill.  Patient normally obtains his Suboxone through the cover clinic but was unable to do so today he is requesting refill and prescription.    Past Medical History  Past Medical History:   Diagnosis Date    Substance abuse (H)      No past surgical history on file.  buprenorphine HCl-naloxone HCl (SUBOXONE) 8-2 MG per film  buprenorphine HCl-naloxone HCl (SUBOXONE) 2-0.5 MG per film  docusate sodium (DSS) 100 MG capsule  lubiprostone (AMITIZA) 24 MCG capsule  naloxone (NARCAN) 4 MG/0.1ML nasal spray  polyethylene glycol (MIRALAX) 17 GM/Dose powder      No Known Allergies  Family History  No family history on file.  Social History   Social History     Tobacco Use    Smoking status: Some Days     Types: Vaping Device     Passive exposure: Never    Tobacco comments:     vape   Vaping Use    Vaping status: Every Day    Substances: Nicotine, Flavoring    Devices: Disposable   Substance Use Topics    Alcohol use: Not Currently    Drug use: Not Currently     Comment: last use 12/2021, takes suboxone      A medically appropriate review of systems was performed with pertinent positives and negatives noted in the HPI, and all other systems negative.    Physical Exam   BP: (!) 153/73  Pulse: 75  Temp: 98.2  F (36.8  C)  Resp: 16  SpO2: 99 %  Physical Exam  Constitutional:       General: He is not in acute distress.     Appearance: Normal appearance. He is not toxic-appearing.   HENT:      Head: Atraumatic.   Eyes:      General: No scleral icterus.     Conjunctiva/sclera: Conjunctivae normal.   Cardiovascular:      Rate and Rhythm: Normal rate.      Heart sounds: Normal heart sounds.   Pulmonary:      Effort:  Pulmonary effort is normal. No respiratory distress.      Breath sounds: Normal breath sounds.   Abdominal:      Palpations: Abdomen is soft.      Tenderness: There is no abdominal tenderness.   Musculoskeletal:         General: No deformity.      Cervical back: Neck supple.   Skin:     General: Skin is warm.   Neurological:      General: No focal deficit present.      Mental Status: He is alert and oriented to person, place, and time.      Sensory: No sensory deficit.      Motor: No weakness.      Coordination: Coordination normal.           ED Course, Procedures, & Data      Procedures       No results found for any visits on 03/15/25.  Medications - No data to display  Labs Ordered and Resulted from Time of ED Arrival to Time of ED Departure - No data to display  No orders to display          Critical care was not performed.     Medical Decision Making  The patient's presentation was of low complexity (a stable chronic illness).    The patient's evaluation involved:  history and exam without other MDM data elements    The patient's management necessitated moderate risk (prescription drug management including medications given in the ED).    Assessment & Plan        I have reviewed the nursing notes. I have reviewed the findings, diagnosis, plan and need for follow up with the patient.    Discharge Medication List as of 3/15/2025  4:47 PM        START taking these medications    Details   buprenorphine HCl-naloxone HCl (SUBOXONE) 8-2 MG per film Place 0.5 Film under the tongue daily., Disp-9 Film, R-0, [facility]             Final diagnoses:   Uncomplicated opioid dependence (H)       Maurisio Juan  formerly [facility] EMERGENCY DEPARTMENT  3/15/2025     Maurisio Juan MD  03/15/25 1937

## 2025-03-27 ENCOUNTER — HOSPITAL ENCOUNTER (EMERGENCY)
Facility: CLINIC | Age: 25
Discharge: HOME | End: 2025-03-27
Attending: EMERGENCY MEDICINE
Payer: COMMERCIAL

## 2025-03-27 VITALS
OXYGEN SATURATION: 98 % | HEIGHT: 70 IN | TEMPERATURE: 98 F | WEIGHT: 184 LBS | SYSTOLIC BLOOD PRESSURE: 110 MMHG | RESPIRATION RATE: 16 BRPM | HEART RATE: 83 BPM | DIASTOLIC BLOOD PRESSURE: 69 MMHG | BODY MASS INDEX: 26.34 KG/M2

## 2025-03-27 DIAGNOSIS — F11.90 OPIOID USE DISORDER: ICD-10-CM

## 2025-03-27 PROCEDURE — 99283 EMERGENCY DEPT VISIT LOW MDM: CPT | Performed by: EMERGENCY MEDICINE

## 2025-03-27 PROCEDURE — 250N000012 HC RX MED GY IP 250 OP 636 PS 637: Performed by: EMERGENCY MEDICINE

## 2025-03-27 RX ORDER — BUPRENORPHINE AND NALOXONE 8; 2 MG/1; MG/1
FILM, SOLUBLE BUCCAL; SUBLINGUAL
Qty: 9 FILM | Refills: 0 | Status: SHIPPED | OUTPATIENT
Start: 2025-03-27 | End: 2025-03-28

## 2025-03-27 RX ORDER — BUPRENORPHINE AND NALOXONE 4; 1 MG/1; MG/1
1 FILM, SOLUBLE BUCCAL; SUBLINGUAL ONCE
Status: COMPLETED | OUTPATIENT
Start: 2025-03-27 | End: 2025-03-27

## 2025-03-27 RX ADMIN — BUPRENORPHINE AND NALOXONE 1 FILM: 4; 1 FILM, SOLUBLE BUCCAL; SUBLINGUAL at 16:27

## 2025-03-27 ASSESSMENT — ACTIVITIES OF DAILY LIVING (ADL): ADLS_ACUITY_SCORE: 41

## 2025-03-27 ASSESSMENT — LIFESTYLE VARIABLES: TOTAL_SCORE: 2

## 2025-03-27 NOTE — ED TRIAGE NOTES
Pt was seen at clinic before arriving and was told they don't do walk in to get suboxone, requesting one time dose.

## 2025-03-27 NOTE — ED TRIAGE NOTES
Triage Assessment (Adult)       Row Name 03/27/25 2910          Triage Assessment    Airway WDL WDL        Respiratory WDL    Respiratory WDL WDL        Skin Circulation/Temperature WDL    Skin Circulation/Temperature WDL WDL        Cardiac WDL    Cardiac WDL WDL        Peripheral/Neurovascular WDL    Peripheral Neurovascular WDL WDL        Cognitive/Neuro/Behavioral WDL    Cognitive/Neuro/Behavioral WDL WDL

## 2025-03-27 NOTE — ED PROVIDER NOTES
ED PROVIDER NOTE  [facility]  [place]      History     Chief Complaint   Patient presents with    Medication Refill     HPI  Reji Goldstein is a 24 year old male who has been on Suboxone 4.5 mg daily for his opiate use disorder and states he tried to go to the recovery clinic today as a walk-in appointment to get a refill.  Patient was referred to the ER to the recovery clinic was closing.  Patient denies any withdrawal symptoms currently.  Denies any other medical complaints.    I have reviewed the Medications, Allergies, Past Medical and Surgical History, and Social History in the [employer] system.    Past Medical History:   Diagnosis Date    Substance abuse (H)        No past surgical history on file.        Dose / Directions   docusate sodium 100 MG capsule  Commonly known as: COLACE  Used for: Therapeutic opioid-induced constipation (OIC)      Dose: 100 mg  Take 1 capsule (100 mg) by mouth 2 times daily as needed for constipation  Quantity: 60 capsule  Refills: 1     lubiprostone 24 MCG capsule  Commonly known as: AMITIZA  Used for: Therapeutic opioid-induced constipation (OIC)      Dose: 24 mcg  Take 1 capsule (24 mcg) by mouth 2 times daily (with meals).  Quantity: 60 capsule  Refills: 11     naloxone 4 MG/0.1ML nasal spray  Commonly known as: NARCAN  Used for: Opioid use disorder, severe, dependence (H)      Dose: 4 mg  Spray 1 spray (4 mg) into one nostril alternating nostrils as needed for opioid reversal every 2-3 minutes until assistance arrives  Quantity: 0.2 mL  Refills: 0     polyethylene glycol 17 GM/Dose powder  Commonly known as: MIRALAX  Used for: Therapeutic opioid-induced constipation (OIC)      Dose: 1 Capful  Take 17 g (1 Capful) by mouth daily as needed for constipation  Quantity: 578 g  Refills: 2           Dose / Directions           * buprenorphine HCl-naloxone HCl 8-2 MG per film  Commonly known as: SUBOXONE  This may have changed: You were already taking a medication  "with the same name, and this prescription was added. Make sure you understand how and when to take each.      Place half a film under the tongue daily  Quantity: 9 Film  Refills: 0           Past medical history, past surgical history, medications, and allergies were reviewed with the patient. Additional pertinent items: None    No family history on file.    Social History     Tobacco Use    Smoking status: Some Days     Types: Vaping Device     Passive exposure: Never    Smokeless tobacco: Not on file    Tobacco comments:     vape   Substance Use Topics    Alcohol use: Not Currently     Social history was reviewed with the patient. Additional pertinent items: None    No Known Allergies    Review of Systems  A medically appropriate review of systems was performed with pertinent positives and negatives noted in the HPI, and all other systems negative.    Physical Exam   BP: 124/79  Pulse: 88  Temp: 97.9  F (36.6  C)  Resp: 18  Height: 177.8 cm (5' 10\")  Weight: 83.5 kg (184 lb)  SpO2: 100 %      Physical Exam  Vitals and nursing note reviewed.   HENT:      Head: Atraumatic.   Eyes:      Extraocular Movements: Extraocular movements intact.      Pupils: Pupils are equal, round, and reactive to light.   Pulmonary:      Effort: No respiratory distress.   Musculoskeletal:         General: No deformity.      Cervical back: Neck supple.   Neurological:      General: No focal deficit present.      Mental Status: He is alert and oriented to person, place, and time.   Psychiatric:         Mood and Affect: Mood normal.         ED Course        Procedures           Critical care was not performed.     Medical Decision Making  The patient's presentation was of low complexity (2+ clearly self-limited or minor problems).    The patient's evaluation involved:  history and exam without other MDM data elements    The patient's management necessitated moderate risk (prescription drug management including medications given in the " ED).      Assessments & Plan (with Medical Decision Making)     I have reviewed the nursing notes.    Medications   buprenorphine HCl-naloxone HCl (SUBOXONE) 4-1 MG per film 1 Film (has no administration in time range)        I have reviewed the findings, diagnosis, plan and need for follow up with the patient.      New Prescriptions    BUPRENORPHINE HCL-NALOXONE HCL (SUBOXONE) 8-2 MG PER FILM    Place half a film under the tongue daily       Final diagnoses:   Opioid use disorder     Fill your medication in our [facility] med machine in the lobby    Please follow up with your recovery clinic in the back hallway for further management of your Suboxone and/or Brixadi in the next 1 to 2 days.      GLORIA EASLEY MD    3/27/2025   [facility] EMERGENCY DEPARTMENT       Gloria Easley MD  03/27/25 4468

## 2025-03-27 NOTE — DISCHARGE INSTRUCTIONS
Fill your medication in our [employer] med machine in the lobby    Please follow up with your recovery clinic in the back hallway for further management of your Suboxone and/or Brixadi in the next 1 to 2 days.

## 2025-03-28 DIAGNOSIS — Z51.81 ENCOUNTER FOR MONITORING OPIOID MAINTENANCE THERAPY: ICD-10-CM

## 2025-03-28 DIAGNOSIS — Z79.891 ENCOUNTER FOR MONITORING OPIOID MAINTENANCE THERAPY: ICD-10-CM

## 2025-03-28 DIAGNOSIS — F11.20 OPIOID USE DISORDER, SEVERE, DEPENDENCE (H): ICD-10-CM

## 2025-03-28 LAB — CREAT UR-MCNC: 186 MG/DL

## 2025-03-28 PROCEDURE — G0481 DRUG TEST DEF 8-14 CLASSES: HCPCS

## 2025-04-01 LAB
BUPRENORPHINE UR CFM-MCNC: 179 NG/ML
BUPRENORPHINE/CREAT UR: 96 NG/MG {CREAT}
NALOXONE UR CFM-MCNC: 1056 NG/ML
NALOXONE: 568 NG/MG {CREAT}
NORBUPRENORPHINE UR CFM-MCNC: 750 NG/ML
NORBUPRENORPHINE/CREAT UR: 403 NG/MG {CREAT}

## 2025-04-19 ENCOUNTER — HOSPITAL ENCOUNTER (EMERGENCY)
Facility: CLINIC | Age: 25
Discharge: HOME | End: 2025-04-19
Attending: EMERGENCY MEDICINE
Payer: COMMERCIAL

## 2025-04-19 VITALS
DIASTOLIC BLOOD PRESSURE: 80 MMHG | SYSTOLIC BLOOD PRESSURE: 129 MMHG | HEART RATE: 79 BPM | OXYGEN SATURATION: 99 % | RESPIRATION RATE: 18 BRPM | HEIGHT: 70 IN | TEMPERATURE: 98.3 F | WEIGHT: 185.85 LBS | BODY MASS INDEX: 26.61 KG/M2

## 2025-04-19 VITALS
HEART RATE: 98 BPM | HEIGHT: 70 IN | OXYGEN SATURATION: 100 % | TEMPERATURE: 98.2 F | BODY MASS INDEX: 26.6 KG/M2 | WEIGHT: 185.8 LBS | SYSTOLIC BLOOD PRESSURE: 121 MMHG | RESPIRATION RATE: 20 BRPM | DIASTOLIC BLOOD PRESSURE: 75 MMHG

## 2025-04-19 DIAGNOSIS — F11.90 OPIOID USE DISORDER: ICD-10-CM

## 2025-04-19 PROCEDURE — 250N000013 HC RX MED GY IP 250 OP 250 PS 637: Performed by: EMERGENCY MEDICINE

## 2025-04-19 PROCEDURE — 250N000012 HC RX MED GY IP 250 OP 636 PS 637: Performed by: EMERGENCY MEDICINE

## 2025-04-19 PROCEDURE — 999N000104 HC STATISTIC NO CHARGE

## 2025-04-19 RX ORDER — BUPRENORPHINE AND NALOXONE 4; 1 MG/1; MG/1
1 FILM, SOLUBLE BUCCAL; SUBLINGUAL ONCE
Status: COMPLETED | OUTPATIENT
Start: 2025-04-19 | End: 2025-04-19

## 2025-04-19 RX ORDER — BUPRENORPHINE AND NALOXONE 8; 2 MG/1; MG/1
1.5 FILM, SOLUBLE BUCCAL; SUBLINGUAL DAILY
Qty: 10 FILM | Refills: 0 | Status: SHIPPED | OUTPATIENT
Start: 2025-04-19

## 2025-04-19 RX ORDER — BUPRENORPHINE HYDROCHLORIDE AND NALOXONE HYDROCHLORIDE DIHYDRATE 8; 2 MG/1; MG/1
1 TABLET SUBLINGUAL ONCE
Status: COMPLETED | OUTPATIENT
Start: 2025-04-19 | End: 2025-04-19

## 2025-04-19 RX ORDER — BUPRENORPHINE HYDROCHLORIDE AND NALOXONE HYDROCHLORIDE DIHYDRATE 2; .5 MG/1; MG/1
1 TABLET SUBLINGUAL ONCE
Status: COMPLETED | OUTPATIENT
Start: 2025-04-19 | End: 2025-04-19

## 2025-04-19 RX ADMIN — BUPRENORPHINE HYDROCHLORIDE AND NALOXONE HYDROCHLORIDE DIHYDRATE 1 TABLET: 2; .5 TABLET SUBLINGUAL at 17:53

## 2025-04-19 RX ADMIN — BUPRENORPHINE AND NALOXONE 1 TABLET: 8; 2 TABLET SUBLINGUAL at 17:53

## 2025-04-19 RX ADMIN — BUPRENORPHINE AND NALOXONE 1 FILM: 4; 1 FILM, SOLUBLE BUCCAL; SUBLINGUAL at 17:53

## 2025-04-19 ASSESSMENT — COLUMBIA-SUICIDE SEVERITY RATING SCALE - C-SSRS
1. IN THE PAST MONTH, HAVE YOU WISHED YOU WERE DEAD OR WISHED YOU COULD GO TO SLEEP AND NOT WAKE UP?: NO
2. HAVE YOU ACTUALLY HAD ANY THOUGHTS OF KILLING YOURSELF IN THE PAST MONTH?: NO
1. IN THE PAST MONTH, HAVE YOU WISHED YOU WERE DEAD OR WISHED YOU COULD GO TO SLEEP AND NOT WAKE UP?: NO
6. HAVE YOU EVER DONE ANYTHING, STARTED TO DO ANYTHING, OR PREPARED TO DO ANYTHING TO END YOUR LIFE?: NO
2. HAVE YOU ACTUALLY HAD ANY THOUGHTS OF KILLING YOURSELF IN THE PAST MONTH?: NO
6. HAVE YOU EVER DONE ANYTHING, STARTED TO DO ANYTHING, OR PREPARED TO DO ANYTHING TO END YOUR LIFE?: NO

## 2025-04-19 ASSESSMENT — ACTIVITIES OF DAILY LIVING (ADL)
ADLS_ACUITY_SCORE: 41
ADLS_ACUITY_SCORE: 41

## 2025-04-19 NOTE — ED PROVIDER NOTES
"ED Provider Note  [facility]      History     Chief Complaint   Patient presents with    Medication Refill     Patient said he took more than prescribed Suboxone and now used up all his medication. He said he's not due for refill until the 24th of April.      HPI  Reji Goldstein is a 25 year old male who     {History Review Selection (Optional):674373}  {ROS Selection (Optional):312709}    Physical Exam   BP: 121/75  Pulse: 98  Temp: 98.2  F (36.8  C)  Resp: 20  Height: 177.8 cm (5' 10\")  Weight: 84.3 kg (185 lb 12.8 oz)  SpO2: 100 %  Physical Exam  ***    ED Course, Procedures, & Data      Procedures       {ED Course Selections (Optional):973470}  {ED Sepsis CMS Documentation (Optional):061221::\" \"}       No results found for any visits on 04/19/25.  Medications - No data to display  Labs Ordered and Resulted from Time of ED Arrival to Time of ED Departure - No data to display  No orders to display          {Critical Care Performed?:607460}    Assessment & Plan    ***    I have reviewed the nursing notes. I have reviewed the findings, diagnosis, plan and need for follow up with the patient.    New Prescriptions    No medications on file       Final diagnoses:   None       Taisha Whyte               ****  [facility] EMERGENCY DEPARTMENT  4/19/2025  "

## 2025-04-19 NOTE — ED PROVIDER NOTES
[facility] EMERGENCY DEPARTMENT ([facility])    4/19/25      ED PROVIDER NOTE   History     Chief Complaint   Patient presents with    Medication Refill     Patient said he took more than prescribed Suboxone and now used up all his medication. He said he's not due for refill until the 24th of April.      HPI  Reji Goldstein is a 25 year old male with a history of opioid use disorder who presents to the ED for suboxone refill. Patient normally obtains his Suboxone through the cover clinic but was unable to do so today he is requesting refill and prescription. Patient states he only takes 5 tablets daily of 2.5 mg. Patient is requesting 7 tablets due to sometimes feeling a crave from the opioids still. Patient states he can only  from pharmacy. Last night was last dose of suboxone. Patient is not open to Brixadi.       Past Medical History  Past Medical History:   Diagnosis Date    Substance abuse (H)      History reviewed. No pertinent surgical history.  buprenorphine HCl-naloxone HCl (SUBOXONE) 2-0.5 MG per film  buprenorphine HCl-naloxone HCl (SUBOXONE) 8-2 MG per film  docusate sodium (DSS) 100 MG capsule  lubiprostone (AMITIZA) 24 MCG capsule  naloxone (NARCAN) 4 MG/0.1ML nasal spray  polyethylene glycol (MIRALAX) 17 GM/Dose powder      No Known Allergies  Family History  History reviewed. No pertinent family history.  Social History   Social History     Tobacco Use    Smoking status: Some Days     Types: Vaping Device     Passive exposure: Never    Tobacco comments:     vape   Vaping Use    Vaping status: Every Day    Substances: Nicotine, Flavoring    Devices: Disposable   Substance Use Topics    Alcohol use: Not Currently    Drug use: Not Currently     Comment: last use 12/2021, takes suboxone      A medically appropriate review of systems was performed with pertinent positives and negatives noted in the HPI, and all other systems negative.    Physical Exam   BP: 121/75  Pulse: 98  Temp: 98.2  F  "(36.8  C)  Resp: 20  Height: 177.8 cm (5' 10\")  Weight: 84.3 kg (185 lb 12.8 oz)  SpO2: 100 %  Physical Exam  ***    ED Course, Procedures, & Data      Procedures       {ED Course Selections (Optional):368558}  {ED Sepsis CMS Documentation (Optional):206914::\" \"}       No results found for any visits on 04/19/25.  Medications - No data to display  Labs Ordered and Resulted from Time of ED Arrival to Time of ED Departure - No data to display  No orders to display          {Critical Care Performed?:999755}    Assessment & Plan    ***    I have reviewed the nursing notes. I have reviewed the findings, diagnosis, plan and need for follow up with the patient.    Discharge Medication List as of 4/19/2025  2:32 PM        START taking these medications    Details   buprenorphine HCl-naloxone HCl (SUBOXONE) 8-2 MG per film Place 1.5 Film under the tongue daily., Disp-10 Film, R-0, Local Print             Final diagnoses:   Opioid use disorder   IBryce Her, am serving as a trained medical scribe to document services personally performed by Taisha Whyte MD, based on the provider's statements to me.     ITaisha MD, was physically present and have reviewed and verified the accuracy of this note documented by Bryce Pedraza.     Taisha Whyte MD  [facility] EMERGENCY DEPARTMENT  4/19/2025  " prescriptions, plan to follow-up with recovery clinic at earliest opportunity    I have reviewed the nursing notes. I have reviewed the findings, diagnosis, plan and need for follow up with the patient.    Discharge Medication List as of 4/19/2025  2:32 PM        START taking these medications    Details   buprenorphine HCl-naloxone HCl (SUBOXONE) 8-2 MG per film Place 1.5 Film under the tongue daily., Disp-10 Film, R-0, Local Print             Final diagnoses:   Opioid use disorder   I, Bryce Pedraza, am serving as a trained medical scribe to document services personally performed by Taisha Whyte MD, based on the provider's statements to me.     I, Taisha Whyte MD, was physically present and have reviewed and verified the accuracy of this note documented by Bryce Pedraza.     Taisha Whyte MD  [facility] EMERGENCY DEPARTMENT  4/19/2025     Taisha Whyte MD  05/12/25 5651

## 2025-04-19 NOTE — ED TRIAGE NOTES
Patient was here earlier for Suboxone refill. He was discharged with prescription. He said pharmacy wont refill his medication.      Triage Assessment (Adult)       Row Name 04/19/25 1706          Triage Assessment    Airway WDL WDL        Respiratory WDL    Respiratory WDL WDL        Skin Circulation/Temperature WDL    Skin Circulation/Temperature WDL WDL        Cardiac WDL    Cardiac WDL WDL        Peripheral/Neurovascular WDL    Peripheral Neurovascular WDL WDL        Cognitive/Neuro/Behavioral WDL    Cognitive/Neuro/Behavioral WDL WDL

## 2025-04-19 NOTE — ED TRIAGE NOTES
Patient said he took more than prescribed Suboxone and now used up all his medication. He said he's not due for refill until the 24th of April.      Triage Assessment (Adult)       Row Name 04/19/25 9135          Triage Assessment    Airway WDL WDL        Respiratory WDL    Respiratory WDL WDL        Skin Circulation/Temperature WDL    Skin Circulation/Temperature WDL WDL        Cardiac WDL    Cardiac WDL WDL        Peripheral/Neurovascular WDL    Peripheral Neurovascular WDL WDL        Cognitive/Neuro/Behavioral WDL    Cognitive/Neuro/Behavioral WDL WDL

## 2025-04-21 ENCOUNTER — TELEPHONE (OUTPATIENT)
Dept: BEHAVIORAL HEALTH | Facility: CLINIC | Age: 25
End: 2025-04-21
Payer: COMMERCIAL

## 2025-04-21 ENCOUNTER — HOSPITAL ENCOUNTER (EMERGENCY)
Facility: CLINIC | Age: 25
Discharge: HOME | End: 2025-04-21
Attending: EMERGENCY MEDICINE | Admitting: EMERGENCY MEDICINE
Payer: COMMERCIAL

## 2025-04-21 VITALS
OXYGEN SATURATION: 100 % | BODY MASS INDEX: 26.47 KG/M2 | WEIGHT: 184.9 LBS | HEART RATE: 72 BPM | SYSTOLIC BLOOD PRESSURE: 129 MMHG | DIASTOLIC BLOOD PRESSURE: 78 MMHG | RESPIRATION RATE: 16 BRPM | TEMPERATURE: 98.1 F | HEIGHT: 70 IN

## 2025-04-21 DIAGNOSIS — F11.10 OPIATE ABUSE, CONTINUOUS (H): ICD-10-CM

## 2025-04-21 PROCEDURE — 99283 EMERGENCY DEPT VISIT LOW MDM: CPT | Performed by: EMERGENCY MEDICINE

## 2025-04-21 PROCEDURE — 250N000013 HC RX MED GY IP 250 OP 250 PS 637: Performed by: EMERGENCY MEDICINE

## 2025-04-21 RX ORDER — BUPRENORPHINE HYDROCHLORIDE AND NALOXONE HYDROCHLORIDE DIHYDRATE 8; 2 MG/1; MG/1
1 TABLET SUBLINGUAL ONCE
Status: COMPLETED | OUTPATIENT
Start: 2025-04-21 | End: 2025-04-21

## 2025-04-21 RX ADMIN — BUPRENORPHINE AND NALOXONE 1 TABLET: 8; 2 TABLET SUBLINGUAL at 16:00

## 2025-04-21 ASSESSMENT — COLUMBIA-SUICIDE SEVERITY RATING SCALE - C-SSRS
2. HAVE YOU ACTUALLY HAD ANY THOUGHTS OF KILLING YOURSELF IN THE PAST MONTH?: NO
1. IN THE PAST MONTH, HAVE YOU WISHED YOU WERE DEAD OR WISHED YOU COULD GO TO SLEEP AND NOT WAKE UP?: NO
6. HAVE YOU EVER DONE ANYTHING, STARTED TO DO ANYTHING, OR PREPARED TO DO ANYTHING TO END YOUR LIFE?: NO

## 2025-04-21 ASSESSMENT — ACTIVITIES OF DAILY LIVING (ADL): ADLS_ACUITY_SCORE: 41

## 2025-04-21 NOTE — TELEPHONE ENCOUNTER
Patient presented to the Recovery Clinic as a walk-in. Patient is a Restricted Recipient through [employer] and has an active referral in place for Dr. Padron. Patient can only fill prescriptions from providers for whom he has an active Restricted Recipient referral in place. Dr. Padron is not in the Recovery Clinic today.     Last Recovery Clinic visit: 3/28/25, Dr. Padron, 2-month follow up recommended    Patient has an active Suboxone 2-0.5mg rx refill from Dr. Padron on file at the [facility]. Per pharmacist, the earliest patient can fill Suboxone rx from Dr. Padron is tomorrow, 4/22/25. Per Dr. Alcala, ok for patient to fill Suboxone rx tomorrow. Pharmacy informed. Dr. Alcala recommends patient follow up soon with Dr. Padron to discuss dose.     Discussed options with patient. Since he is a Restricted Recipient and Dr. Padron is not in the office today, he would need to go to the ED for buprenorphine today, if needed. Patient verbalized understanding.     Patient reports he is taking Suboxone 2-0.5mg, 4 films daily. Patient scheduled with Dr. Padron tomorrow to discuss dose. Patient informed he will be able to  current Suboxone rx from Dr. Padron at the [facility] tomorrow. Patient escorted to ED for buprenorphine dose today.    Arabella Castellano RN on 4/21/2025 at 3:33 PM

## 2025-04-21 NOTE — ED PROVIDER NOTES
"ED PROVIDER NOTE  [facility]  [place]      History     Chief Complaint   Patient presents with    Medication Refill     Pt report \" I am here to get my suboxone\"      HPI  Reji Goldstein is a 25 year old male with a history of opioid use disorder who presents to the ED seeking suboxone.  Patient states he went to the recovery clinic today but was there too late to get a dose in his refills.  Patient states that he cannot get refills until tomorrow but is out of his Suboxone.    I have reviewed the Medications, Allergies, Past Medical and Surgical History, and Social History in the [employer] system.    Past Medical History:   Diagnosis Date    Substance abuse (H)        No past surgical history on file.        Dose / Directions   * buprenorphine HCl-naloxone HCl 2-0.5 MG per film  Commonly known as: Suboxone  Used for: Opioid use disorder, severe, dependence (H)      Dose: 1 Film  Place 1 Film under the tongue 3 times daily.  Quantity: 90 Film  Refills: 1     * buprenorphine HCl-naloxone HCl 8-2 MG per film  Commonly known as: SUBOXONE      Dose: 1.5 Film  Place 1.5 Film under the tongue daily.  Quantity: 10 Film  Refills: 0     docusate sodium 100 MG capsule  Commonly known as: COLACE  Used for: Therapeutic opioid-induced constipation (OIC)      Dose: 100 mg  Take 1 capsule (100 mg) by mouth 2 times daily as needed for constipation  Quantity: 60 capsule  Refills: 1     lubiprostone 24 MCG capsule  Commonly known as: AMITIZA  Used for: Therapeutic opioid-induced constipation (OIC)      Dose: 24 mcg  Take 1 capsule (24 mcg) by mouth 2 times daily (with meals).  Quantity: 60 capsule  Refills: 11     naloxone 4 MG/0.1ML nasal spray  Commonly known as: NARCAN  Used for: Opioid use disorder, severe, dependence (H)      Dose: 4 mg  Spray 1 spray (4 mg) into one nostril alternating nostrils as needed for opioid reversal every 2-3 minutes until assistance arrives  Quantity: 0.2 mL  Refills: 0   " "  polyethylene glycol 17 GM/Dose powder  Commonly known as: MIRALAX  Used for: Therapeutic opioid-induced constipation (OIC)      Dose: 1 Capful  Take 17 g (1 Capful) by mouth daily as needed for constipation  Quantity: 578 g  Refills: 2         Past medical history, past surgical history, medications, and allergies were reviewed with the patient. Additional pertinent items: None    No family history on file.    Social History     Tobacco Use    Smoking status: Some Days     Types: Vaping Device     Passive exposure: Never    Smokeless tobacco: Not on file    Tobacco comments:     vape   Substance Use Topics    Alcohol use: Not Currently     Social history was reviewed with the patient. Additional pertinent items: None    No Known Allergies    Review of Systems  A medically appropriate review of systems was performed with pertinent positives and negatives noted in the HPI, and all other systems negative.    Physical Exam   BP: 129/78  Pulse: 72  Temp: 98.1  F (36.7  C)  Resp: 16  Height: 177.8 cm (5' 10\")  Weight: 83.9 kg (184 lb 14.4 oz)  SpO2: 100 %      Physical Exam  Constitutional:       Appearance: He is not ill-appearing or diaphoretic.   HENT:      Head: Atraumatic.   Eyes:      Extraocular Movements: Extraocular movements intact.      Pupils: Pupils are equal, round, and reactive to light.   Pulmonary:      Breath sounds: Normal breath sounds.   Musculoskeletal:         General: No deformity.      Cervical back: Neck supple.   Neurological:      General: No focal deficit present.      Mental Status: He is alert and oriented to person, place, and time.   Psychiatric:         Mood and Affect: Mood normal.         ED Course        Procedures         Medications   buprenorphine-naloxone (SUBOXONE) 8-2 MG sublingual tablet 1 tablet (has no administration in time range)             Assessments & Plan (with Medical Decision Making)     I have reviewed the nursing notes.    Patient was given 1 dose of Suboxone here " and will follow-up with the recovery clinic tomorrow to get the rest of his medication.    I have reviewed the findings, diagnosis, plan and need for follow up with the patient.          Final diagnoses:   Opiate abuse, continuous (H)       GLORIA EASLEY MD    4/21/2025   [facility] EMERGENCY DEPARTMENT       Gloria Easley MD  04/21/25 2556

## 2025-04-21 NOTE — ED TRIAGE NOTES
Triage Assessment (Adult)       Row Name 04/21/25 1531          Triage Assessment    Airway WDL WDL        Respiratory WDL    Respiratory WDL WDL        Skin Circulation/Temperature WDL    Skin Circulation/Temperature WDL WDL        Cardiac WDL    Cardiac WDL WDL        Peripheral/Neurovascular WDL    Peripheral Neurovascular WDL WDL        Cognitive/Neuro/Behavioral WDL    Cognitive/Neuro/Behavioral WDL WDL

## 2025-05-08 ENCOUNTER — OFFICE VISIT (OUTPATIENT)
Dept: BEHAVIORAL HEALTH | Facility: CLINIC | Age: 25
End: 2025-05-08
Payer: COMMERCIAL

## 2025-05-08 VITALS — DIASTOLIC BLOOD PRESSURE: 77 MMHG | OXYGEN SATURATION: 99 % | SYSTOLIC BLOOD PRESSURE: 116 MMHG | HEART RATE: 104 BPM

## 2025-05-08 DIAGNOSIS — Z51.81 ENCOUNTER FOR MONITORING OPIOID MAINTENANCE THERAPY: ICD-10-CM

## 2025-05-08 DIAGNOSIS — T40.2X5A THERAPEUTIC OPIOID-INDUCED CONSTIPATION (OIC): ICD-10-CM

## 2025-05-08 DIAGNOSIS — Z79.891 ENCOUNTER FOR MONITORING OPIOID MAINTENANCE THERAPY: ICD-10-CM

## 2025-05-08 DIAGNOSIS — K59.03 THERAPEUTIC OPIOID-INDUCED CONSTIPATION (OIC): ICD-10-CM

## 2025-05-08 DIAGNOSIS — F11.20 OPIOID USE DISORDER, SEVERE, DEPENDENCE (H): Primary | ICD-10-CM

## 2025-05-08 RX ORDER — POLYETHYLENE GLYCOL 3350 17 G/17G
1 POWDER, FOR SOLUTION ORAL 2 TIMES DAILY PRN
Qty: 578 G | Refills: 2 | Status: SHIPPED | OUTPATIENT
Start: 2025-05-08

## 2025-05-08 RX ORDER — BUPRENORPHINE AND NALOXONE 8; 2 MG/1; MG/1
1 FILM, SOLUBLE BUCCAL; SUBLINGUAL DAILY
Qty: 30 FILM | Refills: 0 | Status: SHIPPED | OUTPATIENT
Start: 2025-05-08

## 2025-05-08 ASSESSMENT — PATIENT HEALTH QUESTIONNAIRE - PHQ9: SUM OF ALL RESPONSES TO PHQ QUESTIONS 1-9: 0

## 2025-05-08 NOTE — NURSING NOTE
[facility] - Recovery Clinic      Rooming information:    Approximate last use of full opioid agonist: 12/2021  Taking buprenorphine? Yes: suboxone  How much per day? 4-6mg daily  Number of buprenorphine films/tablets remaining currently: a few left  Side effects related to buprenorphine (constipation, dry mouth, sedation?) No   Narcan currently available: Yes  Other recent substance use:    Denies  NICOTINE-Yes: vape  If using nicotine, ready to quit? No    Point of care urine drug screen positive for:  Lab Results   Component Value Date    BUP Screen Positive (A) 05/08/2025    BZO Negative 05/08/2025    BAR Negative 05/08/2025    RAMON Negative 05/08/2025    MAMP Negative 05/08/2025    AMP Negative 05/08/2025    MDMA Negative 05/08/2025    MTD Negative 05/08/2025    CBC144 Negative 05/08/2025    OXY Negative 05/08/2025    PCP Negative 05/08/2025    THC Negative 05/08/2025    TEMP 94 F 05/08/2025    SGPOCT 1.025 05/08/2025       *POC urine drug screen does not screen for Fentanyl        Depression Response    Patient completed the PHQ-9 assessment for depression and scored >9? No  Question 9 on the PHQ-9 was positive for suicidality? No  Does patient have current mental health provider? No  C-SSRS screener risk level.       Is this a virtual visit? No              5/8/2025     2:00 PM   PHQ Assesment Total Score(s)   PHQ-9 Score 0         Housing needs: Stable      Insurance: Active     Current legal issues: None     Contact information up to date? Yes     3rd Party Involvement: None today  (please obtain ELBA if pt would like to include)      Christian Hernandez MA  May 8, 2025  2:55 PM

## 2025-05-08 NOTE — PROGRESS NOTES
[facility] Follow Up    ASSESSMENT/PLAN                                                    1. Opioid use disorder, severe, dependence (H) (Primary)  Pt advancing dose of buprenorphine to 8mg/day.  Reporting control of symptoms.    Continue buprenorphine 8mg/day  Continue self care and other recovery activities  - Drugs of Abuse Screen Urine (POC CUPS) POCT  - buprenorphine HCl-naloxone HCl (SUBOXONE) 8-2 MG per film; Place 1 Film under the tongue daily.  Dispense: 30 Film; Refill: 0    2. Encounter for monitoring opioid maintenance therapy  - Drugs of Abuse Screen Urine (POC CUPS) POCT    3. Therapeutic opioid-induced constipation (OIC)  Continue Miralax prn  - polyethylene glycol (MIRALAX) 17 GM/Dose powder; Take 17 g (1 Capful) by mouth 2 times daily as needed for constipation.  Dispense: 578 g; Refill: 2      Return in about 4 weeks (around 6/5/2025).      Patient counseling completed today:  Discussed mechanism of action, potential risks/benefits/side effects of medications and other recommendations above.    Harm reduction counseling including never use alone, availability of naloxone, risks associated with concurrent use of opioids and benzodiazepines, alcohol, or other sedatives, safer administration as applicable.  Discussed importance of avoiding isolation, building a network of supportive relationships, avoiding people/places/things associated with past use to reduce risk of relapse; including motivational interviewing regarding psychosocial interventions.   SUBJECTIVE                                                          CC/HPI:  Reji Goldstein is a 25 year old male with PMH nicotine use disorder and opioid use disorder on buprenorphine who presents to the Recovery Clinic for return visit.      Brief History:  Reji Goldstein was first seen in Recovery Clinic on 05/15/23. They were referred by staff from [facility] ED after pt was seen there in 3/2023 requesting rx for buprenorphine.  "  Patient's reasons for seeking treatment on this date included wanting to continue taper off buprenorphine.     Pt started buprenorphine for treatment of OUD through [facility] in 12/2021.  Max dose 16mg/day.  He began tapering dose on his own in Fall, 2022.  He has continued to decrease dose since that time.  He denies return to use of fentanyl since 12/2021.  2024 decided to remain on 4-6mg buprenorphine/day vs continuing taper.       5/8/25 HPI:  Pt presents to clinic requesting refill of buprenorphine.  He has been taking 8mg TDD and reports control of cravings and withdrawal symptoms.  Miralax has been helpful to address OIC.   Describes his mood as \"ok,\" states his appetite is normal and is sleeping well.  He is exercising every day.  Still working full time and in school.       Substance Use History :  Opioids:   Age at first use: 19  Current use: substance: Perc 30's ; quantity stated never counted but 10 pills would be the max he would use daily ; route: inhalation  ; timing of last use: 12/2021                 IV drug use: No   History of overdose: Yes: one episode in a vehicle with friends 7/2020 x2  Previous residential or outpatient treatments for addiction : Yes: [facility] in 2021 in patient   Previous medication treatments for addiction: Yes: Currently is taking Suboxone   Longest period of sobriety: 12/2021 to present  Medical complications related to substance use: denies   Hepatitis C: no record of testing  HIV: 8/14/2019 HIV ag/ab nonreactive     Other Addiction History:  Stimulants   Denies   Sedatives/hypnotics/anxiolytics:   Denies   Alcohol:   Denies   Nicotine:   H/o vaping  Cannabis:   Denies   Hallucinogens/Dissociatives:   Denies   Eating disorder:  Denies   Gambling:              Denies       PAST PSYCHIATRIC HISTORY:  Diagnoses- denies   Suicide Attempts: No   Hospitalizations: No         1/30/2025     1:00 PM 3/28/2025     2:00 PM 5/8/2025     2:00 PM   PHQ   PHQ-9 Total Score 0 0 0   Q9: " Thoughts of better off dead/self-harm past 2 weeks Not at all Not at all Not at all          Social History  Housing status: with family father and 8 siblings (pt's mother  in MVC 2019, hit by impaired )  Employment status: works full time at [employer] - 3/2025 promoted to   Relationship status: Single  Children: no children  Legal: denies         [place] Prescription Drug Monitoring Program Reviewed:  Yes  2025 1 Suboxone 2 Mg-0.5 Mg Sl Film 90.00 30 Li Vol [id number] Romel ([id number])  6.00 mg Medicaid [place]   2025 1 Suboxone 2 Mg-0.5 Mg Sl Film 90.00 30 Li Vol             Medications:  Current Outpatient Medications   Medication Sig Dispense Refill    buprenorphine HCl-naloxone HCl (SUBOXONE) 2-0.5 MG per film Place 1 Film under the tongue 3 times daily. 90 Film 1    buprenorphine HCl-naloxone HCl (SUBOXONE) 8-2 MG per film Place 1.5 Film under the tongue daily. 10 Film 0    docusate sodium (DSS) 100 MG capsule Take 1 capsule (100 mg) by mouth 2 times daily as needed for constipation 60 capsule 1    lubiprostone (AMITIZA) 24 MCG capsule Take 1 capsule (24 mcg) by mouth 2 times daily (with meals). 60 capsule 11    naloxone (NARCAN) 4 MG/0.1ML nasal spray Spray 1 spray (4 mg) into one nostril alternating nostrils as needed for opioid reversal every 2-3 minutes until assistance arrives 0.2 mL 0    polyethylene glycol (MIRALAX) 17 GM/Dose powder Take 17 g (1 Capful) by mouth daily as needed for constipation 578 g 2     No current facility-administered medications for this visit.       No Known Allergies    PMH, PSH, FamHx reviewed      OBJECTIVE                                                      /77   Pulse 104   SpO2 99%     Physical Exam  Vitals and nursing note reviewed.   Constitutional:       General: He is not in acute distress.  Eyes:      General: No scleral icterus.     Extraocular Movements: Extraocular movements intact.      Conjunctiva/sclera:  Conjunctivae normal.   Pulmonary:      Effort: Pulmonary effort is normal.   Neurological:      General: No focal deficit present.      Mental Status: He is alert and oriented to person, place, and time.   Psychiatric:         Attention and Perception: Attention normal.         Mood and Affect: Mood normal.         Speech: Speech normal.         Behavior: Behavior normal. Behavior is cooperative.         Thought Content: Thought content normal.         Judgment: Judgment normal.         Labs:      *POC urine drug screen does not screen for Fentanyl      Recent Results (from the past 240 hours)   Drugs of Abuse Screen Urine (POC CUPS) POCT    Collection Time: 05/08/25  3:07 PM   Result Value Ref Range    POCT Kit Lot Number [id number]     POCT Kit Expiration Date 08/05/2026     Temperature Urine POCT 94 F 90 F, 92 F, 94 F, 96 F, 98 F, 100 F    Specific [place] POCT 1.025 1.005, 1.015, 1.025    pH Qual Urine POCT 5 pH 4 pH, 5 pH, 7 pH, 9 pH    Creatinine Qual Urine POCT 50 mg/dL 20 mg/dL, 50 mg/dL, 100 mg/dL, 200 mg/dL    Internal QC Qual Urine POCT Valid Valid    Amphetamine Qual Urine POCT Negative Negative    Barbiturate Qual Urine POCT Negative Negative    Buprenorphine Qual Urine POCT Screen Positive (A) Negative    Benzodiazepine Qual Urine POCT Negative Negative    Cocaine Qual Urine POCT Negative Negative    Methamphetamine Qual Urine POCT Negative Negative    MDMA Qual Urine POCT Negative Negative    Methadone Qual Urine POCT Negative Negative    Opiate Qual Urine POCT Negative Negative    Oxycodone Qual Urine POCT Negative Negative    Phencyclidine Qual Urine POCT Negative Negative    THC Qual Urine POCT Negative Negative           The longitudinal plan of care for the diagnosis(es)/condition(s) as documented were addressed during this visit. Due to the added complexity in care, I will continue to support Reji in the subsequent management and with ongoing continuity of care.      Erica Padron MD  Addiction  [facility]  [address] [place] [address] [address] [place] [id number]  [phone]

## 2025-05-27 ENCOUNTER — OFFICE VISIT (OUTPATIENT)
Dept: BEHAVIORAL HEALTH | Facility: CLINIC | Age: 25
End: 2025-05-27
Payer: COMMERCIAL

## 2025-05-27 VITALS — OXYGEN SATURATION: 100 % | HEART RATE: 76 BPM | SYSTOLIC BLOOD PRESSURE: 117 MMHG | DIASTOLIC BLOOD PRESSURE: 75 MMHG

## 2025-05-27 DIAGNOSIS — Z51.81 ENCOUNTER FOR MONITORING OPIOID MAINTENANCE THERAPY: ICD-10-CM

## 2025-05-27 DIAGNOSIS — F11.20 OPIOID USE DISORDER, SEVERE, DEPENDENCE (H): Primary | ICD-10-CM

## 2025-05-27 DIAGNOSIS — Z79.891 ENCOUNTER FOR MONITORING OPIOID MAINTENANCE THERAPY: ICD-10-CM

## 2025-05-27 RX ORDER — BUPRENORPHINE AND NALOXONE 4; 1 MG/1; MG/1
1 FILM, SOLUBLE BUCCAL; SUBLINGUAL DAILY
Qty: 30 FILM | Refills: 0 | Status: SHIPPED | OUTPATIENT
Start: 2025-05-27

## 2025-05-27 RX ORDER — BUPRENORPHINE AND NALOXONE 2; .5 MG/1; MG/1
1 FILM, SOLUBLE BUCCAL; SUBLINGUAL 2 TIMES DAILY
Qty: 60 FILM | Refills: 0 | Status: SHIPPED | OUTPATIENT
Start: 2025-05-27

## 2025-05-27 NOTE — NURSING NOTE
[facility]      Rooming information:    Approximate last use of full opioid agonist: 12/2021  Taking buprenorphine? Yes: suboxone  How much per day? 8mg daily  Number of buprenorphine films/tablets remaining currently: a few left  Side effects related to buprenorphine (constipation, dry mouth, sedation?) No   Narcan currently available: Yes  Other recent substance use:    Denies  NICOTINE-Yes: vape  If using nicotine, ready to quit? No    Point of care urine drug screen positive for:  Lab Results   Component Value Date    BUP Screen Positive (A) 05/27/2025    BZO Negative 05/27/2025    BAR Negative 05/27/2025    RAMON Negative 05/27/2025    MAMP Negative 05/27/2025    AMP Negative 05/27/2025    MDMA Negative 05/27/2025    MTD Negative 05/27/2025    EHC375 Negative 05/27/2025    OXY Negative 05/27/2025    PCP Negative 05/27/2025    THC Negative 05/27/2025    TEMP 92 F 05/27/2025    SGPOCT 1.015 05/27/2025       *POC urine drug screen does not screen for Fentanyl      Depression Response    Patient completed the PHQ-9 assessment for depression and scored >9? No  Question 9 on the PHQ-9 was positive for suicidality? No  Does patient have current mental health provider? No  C-SSRS screener risk level.       Is this a virtual visit? No    I personally notified the following: visit provider          5/8/2025     2:00 PM   PHQ Assesment Total Score(s)   PHQ-9 Score 0            Housing needs: Stable      Insurance: Active     Current legal issues: None     Contact information up to date? Yes     3rd Party Involvement: None today  (please obtain ELBA if pt would like to include)         Christian Hernandez MA  May 27, 2025  3:55 PM

## 2025-05-27 NOTE — PROGRESS NOTES
[facility] Follow Up    ASSESSMENT/PLAN                                                    1. Opioid use disorder, severe, dependence (H) (Primary)  Reporting control of symptoms  Traveling 5/28/25 to the [facility] for 3 weeks.    Buprenorphine rx's as noted below.  Encouraged him to continue stable dose of buprenorphine vs attempting to taper off without supervision.  He expressed understanding.   He remains uninterested in Sublocade.   - Drugs of Abuse Screen Urine (POC CUPS) POCT  - buprenorphine HCl-naloxone HCl (SUBOXONE) 4-1 MG per film; Place 1 Film under the tongue daily.  Dispense: 30 Film; Refill: 0  - buprenorphine HCl-naloxone HCl (SUBOXONE) 2-0.5 MG per film; Place 1 Film under the tongue 2 times daily.  Dispense: 60 Film; Refill: 0    2. Encounter for monitoring opioid maintenance therapy    - Drugs of Abuse Screen Urine (POC CUPS) POCT      Return in about 4 weeks (around 6/24/2025).      Patient counseling completed today:  Discussed mechanism of action, potential risks/benefits/side effects of medications and other recommendations above.    Harm reduction counseling including never use alone, availability of naloxone, risks associated with concurrent use of opioids and benzodiazepines, alcohol, or other sedatives, safer administration as applicable.  Discussed importance of avoiding isolation, building a network of supportive relationships, avoiding people/places/things associated with past use to reduce risk of relapse; including motivational interviewing regarding psychosocial interventions.   SUBJECTIVE                                                          CC/HPI:  Reji Goldstein is a 25 year old male with PMH nicotine use disorder and opioid use disorder on buprenorphine who presents to the Recovery Clinic for return visit.      Brief History:  Reji Goldstein was first seen in Recovery Clinic on 05/15/23. They were referred by staff from [facility] ED after pt was seen  there in 3/2023 requesting rx for buprenorphine.   Patient's reasons for seeking treatment on this date included wanting to continue taper off buprenorphine.     Pt started buprenorphine for treatment of OUD through [facility] in 12/2021.  Max dose 16mg/day.  He began tapering dose on his own in Fall, 2022.  He has continued to decrease dose since that time.  He denies return to use of fentanyl since 12/2021.  2024 decided to remain on 4-6mg buprenorphine/day vs continuing taper.   5/8/25 buprenorphine increased to 8mg/day following advancement of dose.       5/27/25 HPI:  Pt presents to clinic requesting refill of buprenorphine.  He states he has been taking 8mg/day as prescribed.  He presents early because he is traveling with a friend out of the country for 3 weeks, leaving 5/28/25.  He is very excited for the trip.    He again talks about attempting to taper off buprenorphine with this next rx.        Substance Use History :  Opioids:   Age at first use: 19  Current use: substance: Perc 30's ; quantity stated never counted but 10 pills would be the max he would use daily ; route: inhalation  ; timing of last use: 12/2021                 IV drug use: No   History of overdose: Yes: one episode in a vehicle with friends 7/2020 x2  Previous residential or outpatient treatments for addiction : Yes: [facility] in 2021 in patient   Previous medication treatments for addiction: Yes: Currently is taking Suboxone   Longest period of sobriety: 12/2021 to present  Medical complications related to substance use: denies   Hepatitis C: no record of testing  HIV: 8/14/2019 HIV ag/ab nonreactive     Other Addiction History:  Stimulants   Denies   Sedatives/hypnotics/anxiolytics:   Denies   Alcohol:   Denies   Nicotine:   H/o vaping  Cannabis:   Denies   Hallucinogens/Dissociatives:   Denies   Eating disorder:  Denies   Gambling:              Denies       PAST PSYCHIATRIC HISTORY:  Diagnoses- denies   Suicide Attempts: No    Hospitalizations: No         2025     1:00 PM 3/28/2025     2:00 PM 2025     2:00 PM   PHQ   PHQ-9 Total Score 0 0 0   Q9: Thoughts of better off dead/self-harm past 2 weeks Not at all Not at all Not at all          Social History  Housing status: with family father and 8 siblings (pt's mother  in MVC , hit by impaired )  Employment status: works full time at [employer] - 3/2025 promoted to   Relationship status: Single  Children: no children  Legal: denies         [place] Prescription Drug Monitoring Program Reviewed:  Yes  Filled  Written  ID  Drug  QTY  Days  Prescriber  RX #  Dispenser  Refill  Daily Dose*  Pymt Type      2025 1 Suboxone 8 Mg-2 Mg Sl Film 30.00 30 Li Vol [id number] Romel ([id number]) 0/0 8.00 mg Medicaid [place]       Medications:  Current Outpatient Medications   Medication Sig Dispense Refill    buprenorphine HCl-naloxone HCl (SUBOXONE) 8-2 MG per film Place 1 Film under the tongue daily. 30 Film 0    naloxone (NARCAN) 4 MG/0.1ML nasal spray Spray 1 spray (4 mg) into one nostril alternating nostrils as needed for opioid reversal every 2-3 minutes until assistance arrives 0.2 mL 0    polyethylene glycol (MIRALAX) 17 GM/Dose powder Take 17 g (1 Capful) by mouth 2 times daily as needed for constipation. 578 g 2     No current facility-administered medications for this visit.       No Known Allergies    PMH, PSH, FamHx reviewed      OBJECTIVE                                                      /75   Pulse 76   SpO2 100%     Physical Exam  Vitals and nursing note reviewed.   Constitutional:       General: He is not in acute distress.  Eyes:      General: No scleral icterus.     Extraocular Movements: Extraocular movements intact.      Conjunctiva/sclera: Conjunctivae normal.   Pulmonary:      Effort: Pulmonary effort is normal.   Neurological:      General: No focal deficit present.      Mental Status: He is alert and oriented to person, place, and  time.   Psychiatric:         Attention and Perception: Attention normal.         Mood and Affect: Mood normal.         Speech: Speech normal.         Behavior: Behavior normal. Behavior is cooperative.         Thought Content: Thought content normal.         Judgment: Judgment normal.         Labs:      *POC urine drug screen does not screen for Fentanyl      Recent Results (from the past 240 hours)   Drugs of Abuse Screen Urine (POC CUPS) POCT    Collection Time: 05/27/25  4:02 PM   Result Value Ref Range    POCT Kit Lot Number [id number]     POCT Kit Expiration Date 08/05/2026     Temperature Urine POCT 92 F 90 F, 92 F, 94 F, 96 F, 98 F, 100 F    Specific [place] POCT 1.015 1.005, 1.015, 1.025    pH Qual Urine POCT 7 pH 4 pH, 5 pH, 7 pH, 9 pH    Creatinine Qual Urine POCT 50 mg/dL 20 mg/dL, 50 mg/dL, 100 mg/dL, 200 mg/dL    Internal QC Qual Urine POCT Valid Valid    Amphetamine Qual Urine POCT Negative Negative    Barbiturate Qual Urine POCT Negative Negative    Buprenorphine Qual Urine POCT Screen Positive (A) Negative    Benzodiazepine Qual Urine POCT Negative Negative    Cocaine Qual Urine POCT Negative Negative    Methamphetamine Qual Urine POCT Negative Negative    MDMA Qual Urine POCT Negative Negative    Methadone Qual Urine POCT Negative Negative    Opiate Qual Urine POCT Negative Negative    Oxycodone Qual Urine POCT Negative Negative    Phencyclidine Qual Urine POCT Negative Negative    THC Qual Urine POCT Negative Negative             The longitudinal plan of care for the diagnosis(es)/condition(s) as documented were addressed during this visit. Due to the added complexity in care, I will continue to support Reji in the subsequent management and with ongoing continuity of care.      Erica Padron MD  Addiction Medicine  [place] [id number] [address] [address]  [phone]

## 2025-06-25 ENCOUNTER — OFFICE VISIT (OUTPATIENT)
Dept: BEHAVIORAL HEALTH | Facility: CLINIC | Age: 25
End: 2025-06-25
Payer: COMMERCIAL

## 2025-06-25 VITALS — SYSTOLIC BLOOD PRESSURE: 121 MMHG | HEART RATE: 59 BPM | DIASTOLIC BLOOD PRESSURE: 79 MMHG

## 2025-06-25 DIAGNOSIS — Z51.81 ENCOUNTER FOR MONITORING OPIOID MAINTENANCE THERAPY: ICD-10-CM

## 2025-06-25 DIAGNOSIS — F11.20 OPIOID USE DISORDER, SEVERE, DEPENDENCE (H): Primary | ICD-10-CM

## 2025-06-25 DIAGNOSIS — Z79.891 ENCOUNTER FOR MONITORING OPIOID MAINTENANCE THERAPY: ICD-10-CM

## 2025-06-25 RX ORDER — BUPRENORPHINE AND NALOXONE 2; .5 MG/1; MG/1
1 FILM, SOLUBLE BUCCAL; SUBLINGUAL 2 TIMES DAILY
Qty: 60 FILM | Refills: 0 | Status: SHIPPED | OUTPATIENT
Start: 2025-06-25

## 2025-06-25 RX ORDER — BUPRENORPHINE AND NALOXONE 4; 1 MG/1; MG/1
1 FILM, SOLUBLE BUCCAL; SUBLINGUAL DAILY
Qty: 30 FILM | Refills: 0 | Status: SHIPPED | OUTPATIENT
Start: 2025-06-25

## 2025-06-25 ASSESSMENT — PATIENT HEALTH QUESTIONNAIRE - PHQ9: SUM OF ALL RESPONSES TO PHQ QUESTIONS 1-9: 0

## 2025-06-25 NOTE — PROGRESS NOTES
[facility] Follow Up    ASSESSMENT/PLAN                                                      1. Opioid use disorder, severe, dependence (H) (Primary)  Controlled  Continue buprenorphine 8mg TDD  - Drugs of Abuse Screen Urine (POC CUPS) POCT  - buprenorphine HCl-naloxone HCl (SUBOXONE) 4-1 MG per film; Place 1 Film under the tongue daily.  Dispense: 30 Film; Refill: 0  - buprenorphine HCl-naloxone HCl (SUBOXONE) 2-0.5 MG per film; Place 1 Film under the tongue 2 times daily.  Dispense: 60 Film; Refill: 0    2. Encounter for monitoring opioid maintenance therapy  - Drugs of Abuse Screen Urine (POC CUPS) POCT         Return in about 4 weeks (around 7/23/2025).      Patient counseling completed today:  Discussed mechanism of action, potential risks/benefits/side effects of medications and other recommendations above.    Harm reduction counseling including never use alone, availability of naloxone, risks associated with concurrent use of opioids and benzodiazepines, alcohol, or other sedatives, safer administration as applicable.  Discussed importance of avoiding isolation, building a network of supportive relationships, avoiding people/places/things associated with past use to reduce risk of relapse; including motivational interviewing regarding psychosocial interventions.   SUBJECTIVE                                                          CC/HPI:  Reji Goldstein is a 25 year old male with PMH nicotine use disorder and opioid use disorder on buprenorphine who [facility] to the [facility] for return visit.      Brief History:  Reji Goldstein was first seen in Recovery [facility] on 05/15/23. They were referred by staff from [facility] ED after pt was seen there in 3/2023 requesting rx for buprenorphine.   Patient's reasons for seeking treatment on this date included wanting to continue taper off buprenorphine.     Pt started buprenorphine for treatment of OUD through [facility] in 12/2021.  Max dose  16mg/day.  He began tapering dose on his own in 2022.  He has continued to decrease dose since that time.  He denies return to use of fentanyl since 2021.   decided to remain on 4-6mg buprenorphine/day vs continuing taper.   25 buprenorphine increased to 8mg/day following advancement of dose.        25 HPI:  Pt states he has continued to take buprenorphine TDD 8mg/day.  Denies opioid cravings or return to use.  No c/o side effects.  He would like to continue treatment unchanged.      Substance Use History :  Opioids:   Age at first use: 19  Current use: substance: Perc 30's ; quantity stated never counted but 10 pills would be the max he would use daily ; route: inhalation  ; timing of last use: 2021                 IV drug use: No   History of overdose: Yes: one episode in a vehicle with friends 7/2020 x2  Previous residential or outpatient treatments for addiction : Yes: [facility]  in patient   Previous medication treatments for addiction: Yes: Currently is taking Suboxone   Longest period of sobriety: 2021 to present  Medical complications related to substance use: denies   Hepatitis C: no record of testing  HIV: 2019 HIV ag/ab nonreactive     Other Addiction History:  Stimulants   Denies   Sedatives/hypnotics/anxiolytics:   Denies   Alcohol:   Denies   Nicotine:   H/o vaping  Cannabis:   Denies   Hallucinogens/Dissociatives:   Denies   Eating disorder:  Denies   Gambling:              Denies       PAST PSYCHIATRIC HISTORY:  Diagnoses- denies   Suicide Attempts: No   Hospitalizations: No       3/28/2025     2:00 PM 2025     2:00 PM 2025     4:00 PM   PHQ   PHQ-9 Total Score 0 0 0   Q9: Thoughts of better off dead/self-harm past 2 weeks Not at all Not at all Not at all     Social History  Housing status: with family father and 8 siblings (pt's mother  in MVC 2019, hit by impaired )  Employment status: works full time at [employer] - 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  Relationship status: Single  Children: no children  Legal: yusraies             [place] Prescription Drug Monitoring Program Reviewed:  Yes  05/27/2025 05/27/2025 1 Suboxone 2 Mg-0.5 Mg Sl Film 60.00 30 Li Vol [id number] Romel ([id number]) 0/0 4.00 mg [place]   05/27/2025 05/27/2025 1 Suboxone 4 Mg-1 Mg Sl Film 30.00 30 Li Vol [id number] Romel ([id number]) 0/0 4.00 mg [place]   05/08/2025 05/08/2025 1 Suboxone 8 Mg-2 Mg Sl Film 30.00 30 Li Vol [id number] Romel ([id number]) 0/0 8.00 mg [place]   04/22/2025 03/28/2025 1 Suboxone 2 Mg-0.5 Mg Sl Film 90.00 30 Li Vol [id number]            Medications:  Current Outpatient Medications   Medication Sig Dispense Refill    buprenorphine HCl-naloxone HCl (SUBOXONE) 2-0.5 MG per film Place 1 Film under the tongue 2 times daily. 60 Film 0    buprenorphine HCl-naloxone HCl (SUBOXONE) 4-1 MG per film Place 1 Film under the tongue daily. 30 Film 0    naloxone (NARCAN) 4 MG/0.1ML nasal spray Spray 1 spray (4 mg) into one nostril alternating nostrils as needed for opioid reversal every 2-3 minutes until assistance arrives 0.2 mL 0    polyethylene glycol (MIRALAX) 17 GM/Dose powder Take 17 g (1 Capful) by mouth 2 times daily as needed for constipation. 578 g 2     No current facility-administered medications for this visit.       No Known Allergies    PMH, PSH, FamHx reviewed      OBJECTIVE                                                      /79   Pulse 59     Physical Exam  Constitutional:       General: He is not in acute distress.  Pulmonary:      Effort: Pulmonary effort is normal.   Neurological:      General: No focal deficit present.      Mental Status: He is alert and oriented to person, place, and time.   Psychiatric:         Attention and Perception: Attention normal.         Mood and Affect: Mood normal. Affect is flat.         Speech: Speech normal.         Behavior: Behavior is cooperative.         Thought Content: Thought content normal.         Judgment: Judgment normal.          Labs:  *POC urine drug screen does not screen for Fentanyl      Recent Results (from the past 240 hours)   Drugs of Abuse Screen Urine (POC CUPS) POCT    Collection Time: 06/25/25  4:10 PM   Result Value Ref Range    POCT Kit Lot Number [id number]     POCT Kit Expiration Date 5138469     Temperature Urine POCT 94 F 90 F, 92 F, 94 F, 96 F, 98 F, 100 F    Specific [place] POCT 1.025 1.005, 1.015, 1.025    pH Qual Urine POCT 5 pH 4 pH, 5 pH, 7 pH, 9 pH    Creatinine Qual Urine POCT 50 mg/dL 20 mg/dL, 50 mg/dL, 100 mg/dL, 200 mg/dL    Internal QC Qual Urine POCT Valid Valid    Amphetamine Qual Urine POCT Negative Negative    Barbiturate Qual Urine POCT Negative Negative    Buprenorphine Qual Urine POCT Screen Positive (A) Negative    Benzodiazepine Qual Urine POCT Negative Negative    Cocaine Qual Urine POCT Negative Negative    Methamphetamine Qual Urine POCT Negative Negative    MDMA Qual Urine POCT Negative Negative    Methadone Qual Urine POCT Negative Negative    Opiate Qual Urine POCT Negative Negative    Oxycodone Qual Urine POCT Negative Negative    Phencyclidine Qual Urine POCT Negative Negative    THC Qual Urine POCT Negative Negative            Continued Complex Management  The longitudinal plan of care for the diagnosis(es)/condition(s) as documented were addressed during this visit. Due to the added complexity in care, I will continue to support Reji in the subsequent management and with ongoing continuity of care.      JOHNATHON BRITO MD    [place] [address] [place] [id number] [phone]

## 2025-06-25 NOTE — NURSING NOTE
[facility]      Rooming information:    Approximate last use of full opioid agonist: 12/2021  Taking buprenorphine? Yes: suboxone  How much per day? 8mg  Number of buprenorphine films/tablets remaining currently: 1  Side effects related to buprenorphine (constipation, dry mouth, sedation?) No   Narcan currently available: Yes  Other recent substance use:    Denies  NICOTINE-Yes: vape  If using nicotine, ready to quit? No    Point of care urine drug screen positive for:  Lab Results   Component Value Date    BUP Screen Positive (A) 06/25/2025    BZO Negative 06/25/2025    BAR Negative 06/25/2025    RAMON Negative 06/25/2025    MAMP Negative 06/25/2025    AMP Negative 06/25/2025    MDMA Negative 06/25/2025    MTD Negative 06/25/2025    OGR035 Negative 06/25/2025    OXY Negative 06/25/2025    PCP Negative 06/25/2025    THC Negative 06/25/2025    TEMP 94 F 06/25/2025    SGPOCT 1.025 06/25/2025       *POC urine drug screen does not screen for Fentanyl      Depression Response    Patient completed the PHQ-9 assessment for depression and scored >9? No  Question 9 on the PHQ-9 was positive for suicidality? No  Does patient have current mental health provider? No  C-SSRS screener risk level.       Is this a virtual visit? No    I personally notified the following: DIAZ          6/25/2025     4:00 PM   PHQ Assesment Total Score(s)   PHQ-9 Score 0         Housing needs: stable    Insurance:active    Current legal issues: denies    Contact information up to date? yes    3rd Party Involvement no today (please obtain ELBA if pt would like to include)    Jyoti Mcdonough MA  June 25, 2025  4:08 PM

## 2025-08-11 ENCOUNTER — TELEPHONE (OUTPATIENT)
Dept: BEHAVIORAL HEALTH | Facility: CLINIC | Age: 25
End: 2025-08-11
Payer: COMMERCIAL

## 2025-08-12 ENCOUNTER — TELEPHONE (OUTPATIENT)
Dept: BEHAVIORAL HEALTH | Facility: CLINIC | Age: 25
End: 2025-08-12
Payer: COMMERCIAL

## 2025-08-12 DIAGNOSIS — F11.20 OPIOID USE DISORDER, SEVERE, DEPENDENCE (H): Primary | ICD-10-CM

## 2025-08-12 RX ORDER — BUPRENORPHINE AND NALOXONE 12; 3 MG/1; MG/1
1 FILM, SOLUBLE BUCCAL; SUBLINGUAL DAILY
Qty: 30 FILM | Refills: 0 | Status: SHIPPED | OUTPATIENT
Start: 2025-08-12